# Patient Record
Sex: MALE | Race: WHITE | Employment: OTHER | URBAN - METROPOLITAN AREA
[De-identification: names, ages, dates, MRNs, and addresses within clinical notes are randomized per-mention and may not be internally consistent; named-entity substitution may affect disease eponyms.]

---

## 2017-01-10 ENCOUNTER — OFFICE VISIT (OUTPATIENT)
Dept: CARDIOLOGY CLINIC | Age: 72
End: 2017-01-10

## 2017-01-10 VITALS
SYSTOLIC BLOOD PRESSURE: 120 MMHG | HEART RATE: 64 BPM | HEIGHT: 72 IN | WEIGHT: 270 LBS | DIASTOLIC BLOOD PRESSURE: 60 MMHG | BODY MASS INDEX: 36.57 KG/M2

## 2017-01-10 DIAGNOSIS — I25.810 CORONARY ARTERY DISEASE INVOLVING AUTOLOGOUS VEIN CORONARY BYPASS GRAFT WITHOUT ANGINA PECTORIS: Primary | ICD-10-CM

## 2017-01-10 DIAGNOSIS — I10 ESSENTIAL HYPERTENSION: ICD-10-CM

## 2017-01-10 DIAGNOSIS — E78.00 PURE HYPERCHOLESTEROLEMIA: ICD-10-CM

## 2017-01-10 PROCEDURE — 99214 OFFICE O/P EST MOD 30 MIN: CPT | Performed by: NURSE PRACTITIONER

## 2017-01-10 RX ORDER — DOCUSATE SODIUM 100 MG/1
100 CAPSULE, LIQUID FILLED ORAL DAILY
COMMUNITY
End: 2017-06-15 | Stop reason: ALTCHOICE

## 2017-03-24 DIAGNOSIS — E78.00 PURE HYPERCHOLESTEROLEMIA: ICD-10-CM

## 2017-03-27 RX ORDER — SIMVASTATIN 40 MG
TABLET ORAL
Qty: 90 TABLET | Refills: 2 | Status: SHIPPED | OUTPATIENT
Start: 2017-03-27 | End: 2017-12-18 | Stop reason: SDUPTHER

## 2017-06-15 ENCOUNTER — OFFICE VISIT (OUTPATIENT)
Dept: FAMILY MEDICINE CLINIC | Age: 72
End: 2017-06-15

## 2017-06-15 VITALS
RESPIRATION RATE: 14 BRPM | HEIGHT: 72 IN | WEIGHT: 257 LBS | BODY MASS INDEX: 34.81 KG/M2 | SYSTOLIC BLOOD PRESSURE: 110 MMHG | DIASTOLIC BLOOD PRESSURE: 60 MMHG | HEART RATE: 67 BPM | OXYGEN SATURATION: 98 %

## 2017-06-15 DIAGNOSIS — E78.00 PURE HYPERCHOLESTEROLEMIA: ICD-10-CM

## 2017-06-15 DIAGNOSIS — Z12.5 PROSTATE CANCER SCREENING: ICD-10-CM

## 2017-06-15 DIAGNOSIS — E11.9 TYPE 2 DIABETES MELLITUS WITHOUT COMPLICATION, WITHOUT LONG-TERM CURRENT USE OF INSULIN (HCC): Primary | ICD-10-CM

## 2017-06-15 DIAGNOSIS — E11.9 TYPE 2 DIABETES MELLITUS WITHOUT COMPLICATION, WITHOUT LONG-TERM CURRENT USE OF INSULIN (HCC): ICD-10-CM

## 2017-06-15 DIAGNOSIS — I10 ESSENTIAL HYPERTENSION: ICD-10-CM

## 2017-06-15 DIAGNOSIS — I25.10 CAD IN NATIVE ARTERY: ICD-10-CM

## 2017-06-15 DIAGNOSIS — E66.09 NON MORBID OBESITY DUE TO EXCESS CALORIES: ICD-10-CM

## 2017-06-15 LAB
A/G RATIO: 1.7 (ref 1.1–2.2)
ALBUMIN SERPL-MCNC: 4.5 G/DL (ref 3.4–5)
ALP BLD-CCNC: 40 U/L (ref 40–129)
ALT SERPL-CCNC: 16 U/L (ref 10–40)
ANION GAP SERPL CALCULATED.3IONS-SCNC: 15 MMOL/L (ref 3–16)
AST SERPL-CCNC: 17 U/L (ref 15–37)
BASOPHILS ABSOLUTE: 0 K/UL (ref 0–0.2)
BASOPHILS RELATIVE PERCENT: 0.4 %
BILIRUB SERPL-MCNC: 0.6 MG/DL (ref 0–1)
BUN BLDV-MCNC: 15 MG/DL (ref 7–20)
CALCIUM SERPL-MCNC: 9.6 MG/DL (ref 8.3–10.6)
CHLORIDE BLD-SCNC: 101 MMOL/L (ref 99–110)
CHOLESTEROL, TOTAL: 156 MG/DL (ref 0–199)
CO2: 25 MMOL/L (ref 21–32)
CREAT SERPL-MCNC: 0.9 MG/DL (ref 0.8–1.3)
CREATININE URINE POCT: 30
EOSINOPHILS ABSOLUTE: 0.1 K/UL (ref 0–0.6)
EOSINOPHILS RELATIVE PERCENT: 1.5 %
GFR AFRICAN AMERICAN: >60
GFR NON-AFRICAN AMERICAN: >60
GLOBULIN: 2.7 G/DL
GLUCOSE BLD-MCNC: 108 MG/DL (ref 70–99)
HBA1C MFR BLD: 7.4 %
HCT VFR BLD CALC: 42.7 % (ref 40.5–52.5)
HDLC SERPL-MCNC: 57 MG/DL (ref 40–60)
HEMOGLOBIN: 13.9 G/DL (ref 13.5–17.5)
HEPATITIS C ANTIBODY INTERPRETATION: NORMAL
LDL CHOLESTEROL CALCULATED: 73 MG/DL
LYMPHOCYTES ABSOLUTE: 2 K/UL (ref 1–5.1)
LYMPHOCYTES RELATIVE PERCENT: 39.3 %
MCH RBC QN AUTO: 30.3 PG (ref 26–34)
MCHC RBC AUTO-ENTMCNC: 32.5 G/DL (ref 31–36)
MCV RBC AUTO: 93.3 FL (ref 80–100)
MICROALBUMIN/CREAT 24H UR: 10 MG/G{CREAT}
MICROALBUMIN/CREAT UR-RTO: 30
MONOCYTES ABSOLUTE: 0.4 K/UL (ref 0–1.3)
MONOCYTES RELATIVE PERCENT: 8.2 %
NEUTROPHILS ABSOLUTE: 2.5 K/UL (ref 1.7–7.7)
NEUTROPHILS RELATIVE PERCENT: 50.6 %
PDW BLD-RTO: 13.5 % (ref 12.4–15.4)
PLATELET # BLD: 213 K/UL (ref 135–450)
PMV BLD AUTO: 9.9 FL (ref 5–10.5)
POTASSIUM SERPL-SCNC: 5.3 MMOL/L (ref 3.5–5.1)
PROSTATE SPECIFIC ANTIGEN: 0.38 NG/ML (ref 0–4)
RBC # BLD: 4.57 M/UL (ref 4.2–5.9)
SODIUM BLD-SCNC: 141 MMOL/L (ref 136–145)
TOTAL PROTEIN: 7.2 G/DL (ref 6.4–8.2)
TRIGL SERPL-MCNC: 128 MG/DL (ref 0–150)
VLDLC SERPL CALC-MCNC: 26 MG/DL
WBC # BLD: 5 K/UL (ref 4–11)

## 2017-06-15 PROCEDURE — 99214 OFFICE O/P EST MOD 30 MIN: CPT | Performed by: FAMILY MEDICINE

## 2017-06-15 PROCEDURE — 83036 HEMOGLOBIN GLYCOSYLATED A1C: CPT | Performed by: FAMILY MEDICINE

## 2017-06-15 PROCEDURE — 82044 UR ALBUMIN SEMIQUANTITATIVE: CPT | Performed by: FAMILY MEDICINE

## 2017-06-15 PROCEDURE — 3046F HEMOGLOBIN A1C LEVEL >9.0%: CPT | Performed by: FAMILY MEDICINE

## 2017-06-15 PROCEDURE — G8419 CALC BMI OUT NRM PARAM NOF/U: HCPCS | Performed by: FAMILY MEDICINE

## 2017-06-15 PROCEDURE — 1036F TOBACCO NON-USER: CPT | Performed by: FAMILY MEDICINE

## 2017-06-15 PROCEDURE — 4040F PNEUMOC VAC/ADMIN/RCVD: CPT | Performed by: FAMILY MEDICINE

## 2017-06-15 PROCEDURE — 1123F ACP DISCUSS/DSCN MKR DOCD: CPT | Performed by: FAMILY MEDICINE

## 2017-06-15 PROCEDURE — G8599 NO ASA/ANTIPLAT THER USE RNG: HCPCS | Performed by: FAMILY MEDICINE

## 2017-06-15 PROCEDURE — G8427 DOCREV CUR MEDS BY ELIG CLIN: HCPCS | Performed by: FAMILY MEDICINE

## 2017-06-15 PROCEDURE — 3017F COLORECTAL CA SCREEN DOC REV: CPT | Performed by: FAMILY MEDICINE

## 2017-06-15 ASSESSMENT — PATIENT HEALTH QUESTIONNAIRE - PHQ9
2. FEELING DOWN, DEPRESSED OR HOPELESS: 0
SUM OF ALL RESPONSES TO PHQ9 QUESTIONS 1 & 2: 0
SUM OF ALL RESPONSES TO PHQ QUESTIONS 1-9: 0
1. LITTLE INTEREST OR PLEASURE IN DOING THINGS: 0

## 2017-06-16 LAB
ESTIMATED AVERAGE GLUCOSE: 162.8 MG/DL
HBA1C MFR BLD: 7.3 %

## 2017-06-29 ENCOUNTER — OFFICE VISIT (OUTPATIENT)
Dept: ENT CLINIC | Age: 72
End: 2017-06-29

## 2017-06-29 VITALS — BODY MASS INDEX: 34.54 KG/M2 | HEIGHT: 72 IN | WEIGHT: 255 LBS

## 2017-06-29 DIAGNOSIS — J01.00 SUBACUTE MAXILLARY SINUSITIS: Primary | ICD-10-CM

## 2017-06-29 PROCEDURE — G8427 DOCREV CUR MEDS BY ELIG CLIN: HCPCS | Performed by: OTOLARYNGOLOGY

## 2017-06-29 PROCEDURE — 3017F COLORECTAL CA SCREEN DOC REV: CPT | Performed by: OTOLARYNGOLOGY

## 2017-06-29 PROCEDURE — G8417 CALC BMI ABV UP PARAM F/U: HCPCS | Performed by: OTOLARYNGOLOGY

## 2017-06-29 PROCEDURE — 1036F TOBACCO NON-USER: CPT | Performed by: OTOLARYNGOLOGY

## 2017-06-29 PROCEDURE — G8599 NO ASA/ANTIPLAT THER USE RNG: HCPCS | Performed by: OTOLARYNGOLOGY

## 2017-06-29 PROCEDURE — 99203 OFFICE O/P NEW LOW 30 MIN: CPT | Performed by: OTOLARYNGOLOGY

## 2017-06-29 PROCEDURE — 4040F PNEUMOC VAC/ADMIN/RCVD: CPT | Performed by: OTOLARYNGOLOGY

## 2017-06-29 PROCEDURE — 1123F ACP DISCUSS/DSCN MKR DOCD: CPT | Performed by: OTOLARYNGOLOGY

## 2017-07-03 DIAGNOSIS — E11.9 TYPE 2 DIABETES MELLITUS WITHOUT COMPLICATION, UNSPECIFIED LONG TERM INSULIN USE STATUS: ICD-10-CM

## 2017-07-14 ENCOUNTER — OFFICE VISIT (OUTPATIENT)
Dept: ENT CLINIC | Age: 72
End: 2017-07-14

## 2017-07-14 VITALS
SYSTOLIC BLOOD PRESSURE: 120 MMHG | HEART RATE: 76 BPM | DIASTOLIC BLOOD PRESSURE: 69 MMHG | WEIGHT: 257 LBS | HEIGHT: 72 IN | BODY MASS INDEX: 34.81 KG/M2

## 2017-07-14 DIAGNOSIS — J01.00 SUBACUTE MAXILLARY SINUSITIS: Primary | ICD-10-CM

## 2017-07-14 PROCEDURE — G8428 CUR MEDS NOT DOCUMENT: HCPCS | Performed by: OTOLARYNGOLOGY

## 2017-07-14 PROCEDURE — 99212 OFFICE O/P EST SF 10 MIN: CPT | Performed by: OTOLARYNGOLOGY

## 2017-07-14 PROCEDURE — 1123F ACP DISCUSS/DSCN MKR DOCD: CPT | Performed by: OTOLARYNGOLOGY

## 2017-07-14 PROCEDURE — 1036F TOBACCO NON-USER: CPT | Performed by: OTOLARYNGOLOGY

## 2017-07-14 PROCEDURE — 4040F PNEUMOC VAC/ADMIN/RCVD: CPT | Performed by: OTOLARYNGOLOGY

## 2017-07-14 PROCEDURE — G8417 CALC BMI ABV UP PARAM F/U: HCPCS | Performed by: OTOLARYNGOLOGY

## 2017-07-14 PROCEDURE — 3017F COLORECTAL CA SCREEN DOC REV: CPT | Performed by: OTOLARYNGOLOGY

## 2017-07-14 PROCEDURE — G8599 NO ASA/ANTIPLAT THER USE RNG: HCPCS | Performed by: OTOLARYNGOLOGY

## 2017-07-16 ENCOUNTER — HOSPITAL ENCOUNTER (OUTPATIENT)
Dept: CT IMAGING | Age: 72
Discharge: HOME | End: 2017-07-16
Attending: OTOLARYNGOLOGY | Admitting: OTOLARYNGOLOGY

## 2017-07-16 DIAGNOSIS — J01.00 SUBACUTE MAXILLARY SINUSITIS: ICD-10-CM

## 2017-07-16 DIAGNOSIS — J01.00 ACUTE MAXILLARY SINUSITIS: ICD-10-CM

## 2017-07-17 ENCOUNTER — TELEPHONE (OUTPATIENT)
Dept: ENT CLINIC | Age: 72
End: 2017-07-17

## 2017-07-18 ENCOUNTER — TELEPHONE (OUTPATIENT)
Dept: ENT CLINIC | Age: 72
End: 2017-07-18

## 2017-07-18 DIAGNOSIS — J01.00 SUBACUTE MAXILLARY SINUSITIS: Primary | ICD-10-CM

## 2017-07-18 RX ORDER — DOXYCYCLINE HYCLATE 100 MG
100 TABLET ORAL 2 TIMES DAILY
Qty: 14 TABLET | Refills: 0 | Status: SHIPPED | OUTPATIENT
Start: 2017-07-18 | End: 2017-11-27 | Stop reason: HOSPADM

## 2017-07-19 ENCOUNTER — TELEPHONE (OUTPATIENT)
Dept: ENT CLINIC | Age: 72
End: 2017-07-19

## 2017-07-19 DIAGNOSIS — J01.00 SUBACUTE MAXILLARY SINUSITIS: ICD-10-CM

## 2017-07-26 ENCOUNTER — OFFICE VISIT (OUTPATIENT)
Dept: ENT CLINIC | Age: 72
End: 2017-07-26

## 2017-07-26 VITALS — HEIGHT: 72 IN | WEIGHT: 257 LBS | BODY MASS INDEX: 34.81 KG/M2

## 2017-07-26 DIAGNOSIS — J01.00 SUBACUTE MAXILLARY SINUSITIS: Primary | ICD-10-CM

## 2017-07-26 PROCEDURE — 1036F TOBACCO NON-USER: CPT | Performed by: OTOLARYNGOLOGY

## 2017-07-26 PROCEDURE — 3017F COLORECTAL CA SCREEN DOC REV: CPT | Performed by: OTOLARYNGOLOGY

## 2017-07-26 PROCEDURE — 4040F PNEUMOC VAC/ADMIN/RCVD: CPT | Performed by: OTOLARYNGOLOGY

## 2017-07-26 PROCEDURE — G8599 NO ASA/ANTIPLAT THER USE RNG: HCPCS | Performed by: OTOLARYNGOLOGY

## 2017-07-26 PROCEDURE — G8417 CALC BMI ABV UP PARAM F/U: HCPCS | Performed by: OTOLARYNGOLOGY

## 2017-07-26 PROCEDURE — G8428 CUR MEDS NOT DOCUMENT: HCPCS | Performed by: OTOLARYNGOLOGY

## 2017-07-26 PROCEDURE — 1123F ACP DISCUSS/DSCN MKR DOCD: CPT | Performed by: OTOLARYNGOLOGY

## 2017-07-26 PROCEDURE — 96372 THER/PROPH/DIAG INJ SC/IM: CPT | Performed by: OTOLARYNGOLOGY

## 2017-07-26 PROCEDURE — 99213 OFFICE O/P EST LOW 20 MIN: CPT | Performed by: OTOLARYNGOLOGY

## 2017-07-26 RX ORDER — AMOXICILLIN AND CLAVULANATE POTASSIUM 875; 125 MG/1; MG/1
1 TABLET, FILM COATED ORAL 2 TIMES DAILY
Qty: 14 TABLET | Refills: 0 | Status: SHIPPED | OUTPATIENT
Start: 2017-07-26 | End: 2017-11-21

## 2017-07-26 RX ORDER — METHYLPREDNISOLONE ACETATE 40 MG/ML
40 INJECTION, SUSPENSION INTRA-ARTICULAR; INTRALESIONAL; INTRAMUSCULAR; SOFT TISSUE ONCE
Status: COMPLETED | OUTPATIENT
Start: 2017-07-26 | End: 2017-07-26

## 2017-07-26 RX ADMIN — METHYLPREDNISOLONE ACETATE 40 MG: 40 INJECTION, SUSPENSION INTRA-ARTICULAR; INTRALESIONAL; INTRAMUSCULAR; SOFT TISSUE at 17:40

## 2017-07-31 RX ORDER — LISINOPRIL 2.5 MG/1
2.5 TABLET ORAL EVERY EVENING
Qty: 90 TABLET | Refills: 3 | Status: SHIPPED | OUTPATIENT
Start: 2017-07-31 | End: 2018-10-22 | Stop reason: DRUGHIGH

## 2017-08-09 ENCOUNTER — OFFICE VISIT (OUTPATIENT)
Dept: ENT CLINIC | Age: 72
End: 2017-08-09

## 2017-08-09 VITALS
HEIGHT: 72 IN | SYSTOLIC BLOOD PRESSURE: 115 MMHG | DIASTOLIC BLOOD PRESSURE: 74 MMHG | BODY MASS INDEX: 34.81 KG/M2 | HEART RATE: 68 BPM | WEIGHT: 257 LBS

## 2017-08-09 DIAGNOSIS — J01.00 SUBACUTE MAXILLARY SINUSITIS: Primary | ICD-10-CM

## 2017-08-09 PROCEDURE — 4040F PNEUMOC VAC/ADMIN/RCVD: CPT | Performed by: OTOLARYNGOLOGY

## 2017-08-09 PROCEDURE — G8428 CUR MEDS NOT DOCUMENT: HCPCS | Performed by: OTOLARYNGOLOGY

## 2017-08-09 PROCEDURE — 1036F TOBACCO NON-USER: CPT | Performed by: OTOLARYNGOLOGY

## 2017-08-09 PROCEDURE — G8599 NO ASA/ANTIPLAT THER USE RNG: HCPCS | Performed by: OTOLARYNGOLOGY

## 2017-08-09 PROCEDURE — 3017F COLORECTAL CA SCREEN DOC REV: CPT | Performed by: OTOLARYNGOLOGY

## 2017-08-09 PROCEDURE — G8417 CALC BMI ABV UP PARAM F/U: HCPCS | Performed by: OTOLARYNGOLOGY

## 2017-08-09 PROCEDURE — 1123F ACP DISCUSS/DSCN MKR DOCD: CPT | Performed by: OTOLARYNGOLOGY

## 2017-08-09 PROCEDURE — 99212 OFFICE O/P EST SF 10 MIN: CPT | Performed by: OTOLARYNGOLOGY

## 2017-08-15 DIAGNOSIS — I10 UNSPECIFIED ESSENTIAL HYPERTENSION: ICD-10-CM

## 2017-08-15 RX ORDER — LISINOPRIL 5 MG/1
TABLET ORAL
Qty: 90 TABLET | Refills: 3 | Status: SHIPPED | OUTPATIENT
Start: 2017-08-15 | End: 2017-09-21 | Stop reason: SDUPTHER

## 2017-08-28 DIAGNOSIS — E11.9 TYPE 2 DIABETES MELLITUS WITHOUT COMPLICATION, UNSPECIFIED LONG TERM INSULIN USE STATUS: ICD-10-CM

## 2017-08-28 RX ORDER — PIOGLITAZONEHYDROCHLORIDE 30 MG/1
TABLET ORAL
Qty: 90 TABLET | Refills: 3 | Status: SHIPPED | OUTPATIENT
Start: 2017-08-28 | End: 2018-08-30 | Stop reason: ALTCHOICE

## 2017-09-21 DIAGNOSIS — I10 UNSPECIFIED ESSENTIAL HYPERTENSION: ICD-10-CM

## 2017-09-21 RX ORDER — LISINOPRIL 5 MG/1
TABLET ORAL
Qty: 90 TABLET | Refills: 3 | Status: SHIPPED | OUTPATIENT
Start: 2017-09-21 | End: 2017-12-22

## 2017-11-08 ENCOUNTER — OFFICE VISIT (OUTPATIENT)
Dept: ENT CLINIC | Age: 72
End: 2017-11-08

## 2017-11-08 VITALS
RESPIRATION RATE: 16 BRPM | BODY MASS INDEX: 34.81 KG/M2 | WEIGHT: 257 LBS | HEART RATE: 88 BPM | HEIGHT: 72 IN | DIASTOLIC BLOOD PRESSURE: 89 MMHG | SYSTOLIC BLOOD PRESSURE: 128 MMHG

## 2017-11-08 DIAGNOSIS — J01.00 SUBACUTE MAXILLARY SINUSITIS: Primary | ICD-10-CM

## 2017-11-08 PROCEDURE — G8484 FLU IMMUNIZE NO ADMIN: HCPCS | Performed by: OTOLARYNGOLOGY

## 2017-11-08 PROCEDURE — G8599 NO ASA/ANTIPLAT THER USE RNG: HCPCS | Performed by: OTOLARYNGOLOGY

## 2017-11-08 PROCEDURE — G8427 DOCREV CUR MEDS BY ELIG CLIN: HCPCS | Performed by: OTOLARYNGOLOGY

## 2017-11-08 PROCEDURE — 99213 OFFICE O/P EST LOW 20 MIN: CPT | Performed by: OTOLARYNGOLOGY

## 2017-11-08 PROCEDURE — 1036F TOBACCO NON-USER: CPT | Performed by: OTOLARYNGOLOGY

## 2017-11-08 PROCEDURE — G8417 CALC BMI ABV UP PARAM F/U: HCPCS | Performed by: OTOLARYNGOLOGY

## 2017-11-08 PROCEDURE — 4040F PNEUMOC VAC/ADMIN/RCVD: CPT | Performed by: OTOLARYNGOLOGY

## 2017-11-08 PROCEDURE — 1123F ACP DISCUSS/DSCN MKR DOCD: CPT | Performed by: OTOLARYNGOLOGY

## 2017-11-08 PROCEDURE — 3017F COLORECTAL CA SCREEN DOC REV: CPT | Performed by: OTOLARYNGOLOGY

## 2017-11-21 ENCOUNTER — TELEPHONE (OUTPATIENT)
Dept: ENT CLINIC | Age: 72
End: 2017-11-21

## 2017-11-21 NOTE — TELEPHONE ENCOUNTER
Pt has couple questions about his surgery on Monday 1. Would they be able to repair if there is a passage between gum and sinus while you are in there?   Please  advise

## 2017-11-27 ENCOUNTER — HOSPITAL ENCOUNTER (OUTPATIENT)
Dept: SURGERY | Age: 72
Discharge: HOME | End: 2017-11-27
Attending: OTOLARYNGOLOGY | Admitting: OTOLARYNGOLOGY

## 2017-11-27 VITALS
WEIGHT: 276.25 LBS | HEIGHT: 72 IN | BODY MASS INDEX: 37.42 KG/M2 | OXYGEN SATURATION: 98 % | RESPIRATION RATE: 16 BRPM | DIASTOLIC BLOOD PRESSURE: 68 MMHG | SYSTOLIC BLOOD PRESSURE: 149 MMHG | TEMPERATURE: 97.7 F | HEART RATE: 68 BPM

## 2017-11-27 LAB
A/G RATIO: 1.3 (ref 1.1–2.2)
ALBUMIN SERPL-MCNC: 4 G/DL (ref 3.4–5)
ALP BLD-CCNC: 38 U/L (ref 40–129)
ALT SERPL-CCNC: 17 U/L (ref 10–40)
ANION GAP SERPL CALCULATED.3IONS-SCNC: 12 MMOL/L (ref 3–16)
AST SERPL-CCNC: 18 U/L (ref 15–37)
BILIRUB SERPL-MCNC: 0.5 MG/DL (ref 0–1)
BUN BLDV-MCNC: 17 MG/DL (ref 7–20)
CALCIUM SERPL-MCNC: 8.6 MG/DL (ref 8.3–10.6)
CHLORIDE BLD-SCNC: 101 MMOL/L (ref 99–110)
CO2: 26 MMOL/L (ref 21–32)
CREAT SERPL-MCNC: 0.8 MG/DL (ref 0.8–1.3)
GFR AFRICAN AMERICAN: >60
GFR NON-AFRICAN AMERICAN: >60
GLOBULIN: 3.1 G/DL
GLUCOSE BLD-MCNC: 171 MG/DL (ref 70–99)
GLUCOSE BLD-MCNC: 172 MG/DL (ref 70–99)
HCT VFR BLD CALC: 39.5 % (ref 40.5–52.5)
HEMOGLOBIN: 13.3 G/DL (ref 13.5–17.5)
MCH RBC QN AUTO: 31 PG (ref 26–34)
MCHC RBC AUTO-ENTMCNC: 33.7 G/DL (ref 31–36)
MCV RBC AUTO: 91.8 FL (ref 80–100)
PDW BLD-RTO: 13.6 % (ref 12.4–15.4)
PERFORMED ON: ABNORMAL
PLATELET # BLD: 190 K/UL (ref 135–450)
PMV BLD AUTO: 8.7 FL (ref 5–10.5)
POTASSIUM SERPL-SCNC: 4.4 MMOL/L (ref 3.5–5.1)
RBC # BLD: 4.31 M/UL (ref 4.2–5.9)
SODIUM BLD-SCNC: 139 MMOL/L (ref 136–145)
TOTAL PROTEIN: 7.1 G/DL (ref 6.4–8.2)
WBC # BLD: 4.4 K/UL (ref 4–11)

## 2017-11-27 PROCEDURE — 31267 ENDOSCOPY MAXILLARY SINUS: CPT | Performed by: OTOLARYNGOLOGY

## 2017-11-27 RX ORDER — SODIUM CHLORIDE 0.9 % (FLUSH) 0.9 %
10 SYRINGE (ML) INJECTION PRN
Status: DISCONTINUED | OUTPATIENT
Start: 2017-11-27 | End: 2017-11-28 | Stop reason: HOSPADM

## 2017-11-27 RX ORDER — LIDOCAINE HYDROCHLORIDE 10 MG/ML
1 INJECTION, SOLUTION EPIDURAL; INFILTRATION; INTRACAUDAL; PERINEURAL
Status: ACTIVE | OUTPATIENT
Start: 2017-11-27 | End: 2017-11-27

## 2017-11-27 RX ORDER — SODIUM CHLORIDE 0.9 % (FLUSH) 0.9 %
10 SYRINGE (ML) INJECTION EVERY 12 HOURS SCHEDULED
Status: DISCONTINUED | OUTPATIENT
Start: 2017-11-27 | End: 2017-11-28 | Stop reason: HOSPADM

## 2017-11-27 RX ORDER — OXYMETAZOLINE HYDROCHLORIDE 0.05 G/100ML
1 SPRAY NASAL ONCE
Status: DISCONTINUED | OUTPATIENT
Start: 2017-11-27 | End: 2017-11-28 | Stop reason: HOSPADM

## 2017-11-27 RX ORDER — ONDANSETRON 2 MG/ML
4 INJECTION INTRAMUSCULAR; INTRAVENOUS PRN
Status: DISCONTINUED | OUTPATIENT
Start: 2017-11-27 | End: 2017-11-28 | Stop reason: HOSPADM

## 2017-11-27 RX ORDER — ONDANSETRON 2 MG/ML
4 INJECTION INTRAMUSCULAR; INTRAVENOUS
Status: ACTIVE | OUTPATIENT
Start: 2017-11-27 | End: 2017-11-27

## 2017-11-27 RX ORDER — SODIUM CHLORIDE 9 MG/ML
INJECTION, SOLUTION INTRAVENOUS CONTINUOUS
Status: DISCONTINUED | OUTPATIENT
Start: 2017-11-27 | End: 2017-11-28 | Stop reason: HOSPADM

## 2017-11-27 RX ORDER — OXYCODONE HYDROCHLORIDE 5 MG/1
5 TABLET ORAL PRN
Status: ACTIVE | OUTPATIENT
Start: 2017-11-27 | End: 2017-11-27

## 2017-11-27 RX ORDER — HYDROMORPHONE HCL 110MG/55ML
0.25 PATIENT CONTROLLED ANALGESIA SYRINGE INTRAVENOUS EVERY 5 MIN PRN
Status: DISCONTINUED | OUTPATIENT
Start: 2017-11-27 | End: 2017-11-28 | Stop reason: HOSPADM

## 2017-11-27 RX ORDER — OXYCODONE HYDROCHLORIDE 5 MG/1
10 TABLET ORAL PRN
Status: ACTIVE | OUTPATIENT
Start: 2017-11-27 | End: 2017-11-27

## 2017-11-27 RX ORDER — HYDROMORPHONE HCL 110MG/55ML
0.5 PATIENT CONTROLLED ANALGESIA SYRINGE INTRAVENOUS EVERY 5 MIN PRN
Status: DISCONTINUED | OUTPATIENT
Start: 2017-11-27 | End: 2017-11-28 | Stop reason: HOSPADM

## 2017-11-27 RX ORDER — FENTANYL CITRATE 50 UG/ML
50 INJECTION, SOLUTION INTRAMUSCULAR; INTRAVENOUS EVERY 5 MIN PRN
Status: DISCONTINUED | OUTPATIENT
Start: 2017-11-27 | End: 2017-11-28 | Stop reason: HOSPADM

## 2017-11-27 RX ORDER — LIDOCAINE HYDROCHLORIDE 10 MG/ML
0.5 INJECTION, SOLUTION EPIDURAL; INFILTRATION; INTRACAUDAL; PERINEURAL ONCE
Status: DISCONTINUED | OUTPATIENT
Start: 2017-11-27 | End: 2017-11-28 | Stop reason: HOSPADM

## 2017-11-27 RX ORDER — CEFAZOLIN SODIUM 2 G/100ML
2 INJECTION, SOLUTION INTRAVENOUS
Status: DISCONTINUED | OUTPATIENT
Start: 2017-11-27 | End: 2017-11-27 | Stop reason: DRUGHIGH

## 2017-11-27 RX ORDER — ACETAMINOPHEN 325 MG/1
650 TABLET ORAL EVERY 4 HOURS PRN
Status: DISCONTINUED | OUTPATIENT
Start: 2017-11-27 | End: 2017-11-28 | Stop reason: HOSPADM

## 2017-11-27 RX ORDER — MEPERIDINE HYDROCHLORIDE 25 MG/ML
12.5 INJECTION INTRAMUSCULAR; INTRAVENOUS; SUBCUTANEOUS EVERY 5 MIN PRN
Status: DISCONTINUED | OUTPATIENT
Start: 2017-11-27 | End: 2017-11-28 | Stop reason: HOSPADM

## 2017-11-27 RX ORDER — ONDANSETRON 2 MG/ML
4 INJECTION INTRAMUSCULAR; INTRAVENOUS EVERY 6 HOURS PRN
Status: DISCONTINUED | OUTPATIENT
Start: 2017-11-27 | End: 2017-11-28 | Stop reason: HOSPADM

## 2017-11-27 RX ORDER — FENTANYL CITRATE 50 UG/ML
25 INJECTION, SOLUTION INTRAMUSCULAR; INTRAVENOUS EVERY 5 MIN PRN
Status: DISCONTINUED | OUTPATIENT
Start: 2017-11-27 | End: 2017-11-28 | Stop reason: HOSPADM

## 2017-11-27 RX ADMIN — ACETAMINOPHEN 650 MG: 325 TABLET ORAL at 10:18

## 2017-11-27 RX ADMIN — FENTANYL CITRATE 25 MCG: 50 INJECTION, SOLUTION INTRAMUSCULAR; INTRAVENOUS at 10:08

## 2017-11-27 RX ADMIN — SODIUM CHLORIDE: 9 INJECTION, SOLUTION INTRAVENOUS at 07:37

## 2017-11-27 ASSESSMENT — PAIN - FUNCTIONAL ASSESSMENT: PAIN_FUNCTIONAL_ASSESSMENT: 0-10

## 2017-11-27 ASSESSMENT — PAIN DESCRIPTION - LOCATION
LOCATION: HEAD
LOCATION: HEAD

## 2017-11-27 ASSESSMENT — PAIN DESCRIPTION - PAIN TYPE
TYPE: SURGICAL PAIN
TYPE: SURGICAL PAIN

## 2017-11-27 ASSESSMENT — PAIN DESCRIPTION - DESCRIPTORS: DESCRIPTORS: ACHING

## 2017-11-27 ASSESSMENT — PAIN SCALES - GENERAL
PAINLEVEL_OUTOF10: 5
PAINLEVEL_OUTOF10: 5

## 2017-11-27 NOTE — PROGRESS NOTES
Patient to bathroom to urinate without difficulty. All personal belongings with patient.  Wife taking patient home in stable condition    Electronically signed by Edilberto Krause RN on 11/27/2017 at [phone]

## 2017-11-27 NOTE — ANESTHESIA PRE-OP
MCG/ACT nasal spray 2 sprays by Nasal route daily 7/19/17   Matthias Flynn MD   budesonide (RHINOCORT AQUA) 32 MCG/ACT nasal spray 2 sprays by Nasal route daily 7/19/17   Matthias Flynn MD   doxycycline hyclate (VIBRA-TABS) 100 MG tablet Take 1 tablet by mouth 2 times daily 7/18/17   Matthias Flynn MD   metFORMIN (GLUCOPHAGE) 1000 MG tablet TAKE ONE TABLET BY MOUTH TWICE A DAY WITH MEALS 7/3/17   Shanice Butler MD   simvastatin (ZOCOR) 40 MG tablet TAKE ONE TABLET BY MOUTH ONCE NIGHTLY 3/27/17   Berta Zepeda MD   aspirin 325 MG EC tablet Take 1 tablet by mouth daily 12/14/15   Shanice Butler MD   glucose monitoring kit (FREESTYLE) monitoring kit 1 kit by Does not apply route daily as needed 8/17/15   Shanice Butler MD   glucose blood VI test strips (EXACTECH TEST) strip 1 each by In Vitro route 3 times daily As needed.  8/17/15   Shanice Butler MD   Lancets MISC One Touch Verio IQ Pt test TID 8/17/15   Shanice Butler MD   Multiple Vitamins-Minerals (THERAPEUTIC MULTIVITAMIN-MINERALS) tablet Take 1 tablet by mouth daily    Historical Provider, MD       Current Outpatient Prescriptions   Medication Sig Dispense Refill    metoprolol tartrate (LOPRESSOR) 25 MG tablet Take 0.5 tablets by mouth 2 times daily 180 tablet 1    lisinopril (PRINIVIL;ZESTRIL) 5 MG tablet TAKE 1/2 TABLET BY MOUTH DAILY 90 tablet 3    pioglitazone (ACTOS) 30 MG tablet TAKE ONE TABLET BY MOUTH DAILY 90 tablet 3    lisinopril (ZESTRIL) 2.5 MG tablet Take 1 tablet by mouth every evening 90 tablet 3    budesonide (RHINOCORT AQUA) 32 MCG/ACT nasal spray 2 sprays by Nasal route daily 1 Bottle 0    budesonide (RHINOCORT AQUA) 32 MCG/ACT nasal spray 2 sprays by Nasal route daily 1 Bottle 0    doxycycline hyclate (VIBRA-TABS) 100 MG tablet Take 1 tablet by mouth 2 times daily 14 tablet 0    metFORMIN (GLUCOPHAGE) 1000 MG tablet TAKE ONE TABLET BY MOUTH TWICE A DAY WITH MEALS 180 tablet 2    simvastatin (ZOCOR) 40 MG tablet TAKE ONE TABLET BY MOUTH ONCE NIGHTLY 90 tablet 2    aspirin 325 MG EC tablet Take 1 tablet by mouth daily 30 tablet 3    glucose monitoring kit (FREESTYLE) monitoring kit 1 kit by Does not apply route daily as needed 1 kit 0    glucose blood VI test strips (EXACTECH TEST) strip 1 each by In Vitro route 3 times daily As needed. 100 each 3    Lancets MISC One Touch Verio IQ Pt test  each 3    Multiple Vitamins-Minerals (THERAPEUTIC MULTIVITAMIN-MINERALS) tablet Take 1 tablet by mouth daily       Current Facility-Administered Medications   Medication Dose Route Frequency Provider Last Rate Last Dose    fentaNYL (SUBLIMAZE) injection 25 mcg  25 mcg Intravenous Q5 Min PRN Kevin Rojas, DO        fentaNYL (SUBLIMAZE) injection 50 mcg  50 mcg Intravenous Q5 Min PRN Kevin oRjas, DO        HYDROmorphone (DILAUDID) injection 0.25 mg  0.25 mg Intravenous Q5 Min PRN Kevin Rojas, DO        HYDROmorphone (DILAUDID) injection 0.5 mg  0.5 mg Intravenous Q5 Min PRN Kevin Rojas, DO        oxyCODONE (ROXICODONE) immediate release tablet 5 mg  5 mg Oral PRN Kevin Rojas, DO        Or    oxyCODONE (ROXICODONE) immediate release tablet 10 mg  10 mg Oral PRN Kevin Rojas, DO        [facility]ansetron [facility] injection 4 mg  4 mg Intravenous Once PRN Kevin Rojas, DO        meperidine (DEMEROL) injection 12.5 mg  12.5 mg Intravenous Q5 Min PRN Kevin Rojas, DO           Vital Signs  (Current) There were no vitals filed for this visit.   (for past 48 hrs)  No Data Recorded  (last three values)   BP Readings from Last 3 Encounters:   11/08/17 128/89   08/09/17 115/74   07/14/17 120/69       CBC  Lab Results   Component Value Date    WBC 5.0 06/15/2017    RBC 4.57 06/15/2017    HGB 13.9 06/15/2017    HCT 42.7 06/15/2017    MCV 93.3 06/15/2017    RDW 13.5 06/15/2017     06/15/2017       CMP    Lab Results   Component Value Date     06/15/2017    K 5.3 06/15/2017     06/15/2017    CO2 25 06/15/2017    BUN 15 06/15/2017    CREATININE 0.9 06/15/2017    GFRAA >60 06/15/2017    GFRAA >60 03/22/2013    AGRATIO 1.7 06/15/2017    LABGLOM >60 06/15/2017    GLUCOSE 108 06/15/2017    PROT 7.2 06/15/2017    PROT 7.4 02/29/2012    CALCIUM 9.6 06/15/2017    BILITOT 0.6 06/15/2017    ALKPHOS 40 06/15/2017    AST 17 06/15/2017    ALT 16 06/15/2017       BMP    Lab Results   Component Value Date     06/15/2017    K 5.3 06/15/2017     06/15/2017    CO2 25 06/15/2017    BUN 15 06/15/2017    CREATININE 0.9 06/15/2017    CALCIUM 9.6 06/15/2017    GFRAA >60 06/15/2017    GFRAA >60 03/22/2013    LABGLOM >60 06/15/2017    GLUCOSE 108 06/15/2017       Coags   Lab Results   Component Value Date    PROTIME 12.1 08/09/2015    INR 1.11 08/09/2015    APTT 53.5 08/11/2015       HCG (If Applicable) No results found for: PREGTESTUR, PREGSERUM, HCG, HCGQUANT     ABGs  Lab Results   Component Value Date    PHART 7.357 08/11/2015    PO2ART 95 08/11/2015    DXZ5CYZ 40 08/11/2015    UIT7VEU 22.8 08/11/2015    BEART -3 08/11/2015    J4CTGUPX 97 08/11/2015        Type & Screen (If Applicable)  No results found for: LABABO, LABRH      POCGlucose  No results for input(s): GLUCOSE in the last 72 hours. NPO Status  > 8 hours                       BMI  There is no height or weight on file to calculate BMI. Estimated body mass index is 34.58 kg/m² as calculated from the following:    Height as of 11/21/17: 6' (1.829 m). Weight as of 11/21/17: 255 lb (115.7 kg).       Additional Testing (Echo, Stress, ECG, PFTs, etc)        Anesthesia Evaluation  Patient summary reviewed and Nursing notes reviewed no history of anesthetic complications:   Airway: Mallampati: III  TM distance: >3 FB   Neck ROM: full  Mouth opening: > = 3 FB Dental: normal exam         Pulmonary:normal exam    (+) sleep apnea:                             Cardiovascular:  Exercise tolerance: poor (<4 METS),   (+) hypertension: moderate, past MI: > 6 months, CAD: obstructive, CABG/stent (CABG 5232):, CHF: systolic, hyperlipidemia      ECG reviewed  Rhythm: regular  Rate: normal           Beta Blocker:  Dose within 24 Hrs      ROS comment: EF 45% unsuccessful Stent attempt     Neuro/Psych:               GI/Hepatic/Renal:             Endo/Other:    (+) Type II DM, , .                 Abdominal:           Vascular:                                        Anesthesia Plan      general     ASA 3     (Plan for GETA with standard ASA monitoring. Additional monitoring as dictated by intra-operative course. Patient appropriately NPO for the procedure. Risk/Benefits reviewed with patient and all anesthetic questions answered prior to procedure.  )  Induction: intravenous. MIPS: Postoperative opioids intended and Prophylactic antiemetics administered. Anesthetic plan and risks discussed with patient. Plan discussed with CRNA. DOS STAFF ADDENDUM:    Pt seen and examined, chart reviewed (including anesthesia, drug and allergy history). No interval changes to history and physical examination. Anesthetic plan, risks, benefits, alternatives, and personnel involved discussed with patient. Patient verbalized an understanding and agrees to proceed.       Linda Chris DO  November 27, 2017  6:52 AM

## 2017-11-27 NOTE — PROGRESS NOTES
Patient awake and alert. Oxygen saturation 97% on room air. NSR on the monitor. VSS. Mustache dressing in place, no drainage noted at this time. Pain tolerable. Tolerating PO without nausea. Patient meets criteria to be discharged from phase 1. Will prepare for discharge home in phase 2.     Electronically signed by Dariel Trimble RN on 11/27/2017 at [phone]

## 2017-11-27 NOTE — PROGRESS NOTES
Patient arrived from OR to PACU. Oxygen saturation 97% on room air. NSR on the monitor. VSS. Mustache dressing in place. Ice applied.  Will continue to monitor    Electronically signed by Jose Yanes RN on 11/27/2017 at [phone]

## 2017-11-27 NOTE — PROGRESS NOTES
Patient rating pain 5/10; titrating fentanyl per PRN order    Electronically signed by Stella Gallagher RN on 11/27/2017 at

## 2017-11-27 NOTE — H&P
of complication, not stated as uncontrolled; and Unspecified sleep apnea. Past Surgical History:   has a past surgical history that includes Lap Band (9/2008); Retinal detachment surgery (1999); Cataract removal with implant (2002); Coronary artery bypass graft (8/11/2015); and Cardiac surgery. Medications:  Current Outpatient Prescriptions on File Prior to Encounter   Medication Sig Dispense Refill    metoprolol tartrate (LOPRESSOR) 25 MG tablet Take 0.5 tablets by mouth 2 times daily 180 tablet 1    lisinopril (PRINIVIL;ZESTRIL) 5 MG tablet TAKE 1/2 TABLET BY MOUTH DAILY 90 tablet 3    pioglitazone (ACTOS) 30 MG tablet TAKE ONE TABLET BY MOUTH DAILY 90 tablet 3    lisinopril (ZESTRIL) 2.5 MG tablet Take 1 tablet by mouth every evening 90 tablet 3    budesonide (RHINOCORT AQUA) 32 MCG/ACT nasal spray 2 sprays by Nasal route daily 1 Bottle 0    budesonide (RHINOCORT AQUA) 32 MCG/ACT nasal spray 2 sprays by Nasal route daily 1 Bottle 0    doxycycline hyclate (VIBRA-TABS) 100 MG tablet Take 1 tablet by mouth 2 times daily 14 tablet 0    metFORMIN (GLUCOPHAGE) 1000 MG tablet TAKE ONE TABLET BY MOUTH TWICE A DAY WITH MEALS 180 tablet 2    simvastatin (ZOCOR) 40 MG tablet TAKE ONE TABLET BY MOUTH ONCE NIGHTLY 90 tablet 2    aspirin 325 MG EC tablet Take 1 tablet by mouth daily 30 tablet 3    glucose monitoring kit (FREESTYLE) monitoring kit 1 kit by Does not apply route daily as needed 1 kit 0    glucose blood VI test strips (EXACTECH TEST) strip 1 each by In Vitro route 3 times daily As needed. 100 each 3    Lancets MISC One Touch Verio IQ Pt test  each 3    Multiple Vitamins-Minerals (THERAPEUTIC MULTIVITAMIN-MINERALS) tablet Take 1 tablet by mouth daily       No current facility-administered medications on file prior to encounter. Allergies:  No Known Allergies     Social History:   reports that he has quit smoking.  He has never used smokeless tobacco. He reports that he does not drink alcohol or use drugs. Family History:  family history includes Alzheimer's Disease in his mother; Cancer in his father; Diabetes in his brother and father; Stroke in his mother. Physical Exam:  BP (!) 140/78   Pulse 88   Temp 97.5 °F (36.4 °C) (Temporal)   Resp 12   Ht 6' (1.829 m)   Wt 276 lb 4 oz (125.3 kg)   SpO2 97%   BMI 37.47 kg/m²     General appearance:  Appears comfortable. Well nourished  Eyes: Sclera clear, pupils equal  ENT: Moist mucus membranes, no thrush. Trachea midline. Cardiovascular: Regular rhythm, normal S1, S2. No murmur, gallop, rub. No edema in lower extremities  Respiratory: Clear to auscultation bilaterally, no wheeze, good inspiratory effort  Gastrointestinal: Abdomen soft, non-tender, not distended, normal bowel sounds  Musculoskeletal: No cyanosis in digits, neck supple  Neurology: Cranial nerves grossly intact. Alert and oriented in time, place and person. No speech or motor deficits  Psychiatry: Appropriate affect. Not agitated  Skin: Warm, dry, normal turgor, no rash    Labs:  CBC:   Lab Results   Component Value Date    WBC 4.4 11/27/2017    RBC 4.31 11/27/2017    HGB 13.3 11/27/2017    HCT 39.5 11/27/2017    MCV 91.8 11/27/2017    MCH 31.0 11/27/2017    MCHC 33.7 11/27/2017    RDW 13.6 11/27/2017     11/27/2017    MPV 8.7 11/27/2017     BMP:    Lab Results   Component Value Date     11/27/2017    K 4.4 11/27/2017     11/27/2017    CO2 26 11/27/2017    BUN 17 11/27/2017    CREATININE 0.8 11/27/2017    CALCIUM 8.6 11/27/2017    GFRAA >60 11/27/2017    GFRAA >60 03/22/2013    LABGLOM >60 11/27/2017    GLUCOSE 171 11/27/2017       EKG reviewed and shows Q waves in inferior leads which is unchanged when compared to previous EKG from 2015     Problem List  CAD  HTN  T2DM      Assessment & Recommendation:     1. CAD:  S/P CABG in 2015: Follows with cardiology Latosha  2. HTN:  BP control is adequate  3.  T2DM:  BG this am is 174 Recent AIC 7.4   4. FRANCISCO:  Uses CPAP at hs     No problems with previous anesthesia      Patient is medically optimized for surgery. Thank you for the opportunity to participate in the care of your patient.   Carl Post CNP    11/27/2017 7:04 AM

## 2017-11-27 NOTE — PROGRESS NOTES
Discharge instructions went over with patient and patient's wife. Dr. Wing Mathurs in to talk to patient. Mustache dressing kit provided.     Electronically signed by Rhiannon Burnett RN on 11/27/2017 at 11:22 AM

## 2017-11-29 NOTE — OP NOTE
[address]                      [address] [facility], [address] [facility]                                 OPERATIVE REPORT    PATIENT NAME: Regino Marina                    :        1945  MED REC NO:   [id number]                          ROOM:  ACCOUNT NO:   [de-identified]                          ADMIT DATE: 2017  PROVIDER:     Alo Anderson MD    DATE OF PROCEDURE:  2017    PREOPERATIVE DIAGNOSIS:  Chronic left maxillary sinusitis. POSTOPERATIVE DIAGNOSIS:  Chronic left maxillary sinusitis. OPERATION PERFORMED:  Functional endoscopic sinus surgery and maxillary  left antrectomy. SURGEON:  Alo Anderson MD.    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 This had been repaired in the dental office and a permanent  bridge now occupied the upper left alveolar ridge. Concerns were raised,  however, given a persistent odor localized to the left side as well as  radiographic findings of opacification of the left antrum. Medical  management had been efficacious and is now brought to the operating room  for surgical drainage. OPERATIVE PROCEDURE:  The patient was placed in the table in supine  position. After general anesthesia was induced, an endotracheal tube was  placed and the patient ventilated well throughout the procedure. The right  eye was protected with tape and the left eye protected with ointment. The  nose was cocainized with 2 mL of 4% cocaine and time allowed for adequate  vasoconstriction. The patient was then draped such that the mid face was  exposed. Following removal of the pledgets, rhinoscopy revealed purulence emanating  from the left middle meatus. A 1 mL of 1% Xylocaine with 1:100,000  epinephrine was injected along the lateral left nasal wall anterior to the  anterior aspect of the middle turbinate. Time was allowed for further  vasoconstriction.     There was noted to be slight

## 2017-12-01 ENCOUNTER — OFFICE VISIT (OUTPATIENT)
Dept: ENT CLINIC | Age: 72
End: 2017-12-01

## 2017-12-01 VITALS
DIASTOLIC BLOOD PRESSURE: 60 MMHG | WEIGHT: 280 LBS | HEART RATE: 72 BPM | HEIGHT: 72 IN | SYSTOLIC BLOOD PRESSURE: 110 MMHG | BODY MASS INDEX: 37.93 KG/M2

## 2017-12-01 DIAGNOSIS — J01.00 SUBACUTE MAXILLARY SINUSITIS: Primary | ICD-10-CM

## 2017-12-01 LAB
ANAEROBIC CULTURE: ABNORMAL
CULTURE SURGICAL: ABNORMAL
CULTURE SURGICAL: ABNORMAL
GRAM STAIN RESULT: ABNORMAL
ORGANISM: ABNORMAL
ORGANISM: ABNORMAL

## 2017-12-01 PROCEDURE — 99024 POSTOP FOLLOW-UP VISIT: CPT | Performed by: OTOLARYNGOLOGY

## 2017-12-01 RX ORDER — AMOXICILLIN AND CLAVULANATE POTASSIUM 875; 125 MG/1; MG/1
1 TABLET, FILM COATED ORAL 2 TIMES DAILY
Qty: 14 TABLET | Refills: 0 | Status: SHIPPED | OUTPATIENT
Start: 2017-12-01 | End: 2017-12-22

## 2017-12-01 NOTE — PROGRESS NOTES
The patient returns for his 1 week postop visit following a left maxillary antrostomy/antrectomy. The odor is gone and the nasal airway much more patent  He has been using his nasal saline spray 3-4 times a day. A small amount of blood tinged and scabbed material has been mobilized    Exam today shows minimal swelling in the region of the left middle turbinate. Remaining debris was suctioned.   The sinus opening appears patent    We reviewed the microbiology report showing 4+ streptococci  He will take Augmentin for 1 week  He will continue irrigations twice a day  I will see him back in 3 weeks for more long-term follow-up

## 2017-12-18 DIAGNOSIS — E78.00 PURE HYPERCHOLESTEROLEMIA: ICD-10-CM

## 2017-12-18 RX ORDER — SIMVASTATIN 40 MG
40 TABLET ORAL NIGHTLY
Qty: 90 TABLET | Refills: 3 | Status: SHIPPED | OUTPATIENT
Start: 2017-12-18 | End: 2018-10-22 | Stop reason: ALTCHOICE

## 2017-12-22 ENCOUNTER — OFFICE VISIT (OUTPATIENT)
Dept: ENT CLINIC | Age: 72
End: 2017-12-22

## 2017-12-22 VITALS — SYSTOLIC BLOOD PRESSURE: 120 MMHG | DIASTOLIC BLOOD PRESSURE: 70 MMHG | BODY MASS INDEX: 38.11 KG/M2 | WEIGHT: 281 LBS

## 2017-12-22 DIAGNOSIS — J32.0 CHRONIC LEFT MAXILLARY SINUSITIS: Primary | ICD-10-CM

## 2017-12-22 PROCEDURE — 99024 POSTOP FOLLOW-UP VISIT: CPT | Performed by: OTOLARYNGOLOGY

## 2017-12-28 ENCOUNTER — OFFICE VISIT (OUTPATIENT)
Dept: FAMILY MEDICINE CLINIC | Age: 72
End: 2017-12-28

## 2017-12-28 VITALS
HEIGHT: 72 IN | SYSTOLIC BLOOD PRESSURE: 130 MMHG | TEMPERATURE: 96.8 F | BODY MASS INDEX: 38.13 KG/M2 | OXYGEN SATURATION: 97 % | HEART RATE: 76 BPM | WEIGHT: 281.53 LBS | DIASTOLIC BLOOD PRESSURE: 70 MMHG | RESPIRATION RATE: 14 BRPM

## 2017-12-28 DIAGNOSIS — J01.00 SUBACUTE MAXILLARY SINUSITIS: ICD-10-CM

## 2017-12-28 DIAGNOSIS — I10 ESSENTIAL HYPERTENSION: Primary | ICD-10-CM

## 2017-12-28 DIAGNOSIS — G57.02 PIRIFORMIS SYNDROME OF LEFT SIDE: ICD-10-CM

## 2017-12-28 DIAGNOSIS — I25.10 CORONARY ARTERY DISEASE INVOLVING NATIVE CORONARY ARTERY OF NATIVE HEART WITHOUT ANGINA PECTORIS: ICD-10-CM

## 2017-12-28 DIAGNOSIS — E11.9 TYPE 2 DIABETES MELLITUS WITHOUT COMPLICATION, WITHOUT LONG-TERM CURRENT USE OF INSULIN (HCC): ICD-10-CM

## 2017-12-28 DIAGNOSIS — E78.2 MIXED HYPERLIPIDEMIA: ICD-10-CM

## 2017-12-28 PROCEDURE — 4040F PNEUMOC VAC/ADMIN/RCVD: CPT | Performed by: FAMILY MEDICINE

## 2017-12-28 PROCEDURE — G8599 NO ASA/ANTIPLAT THER USE RNG: HCPCS | Performed by: FAMILY MEDICINE

## 2017-12-28 PROCEDURE — G8427 DOCREV CUR MEDS BY ELIG CLIN: HCPCS | Performed by: FAMILY MEDICINE

## 2017-12-28 PROCEDURE — 1123F ACP DISCUSS/DSCN MKR DOCD: CPT | Performed by: FAMILY MEDICINE

## 2017-12-28 PROCEDURE — G8484 FLU IMMUNIZE NO ADMIN: HCPCS | Performed by: FAMILY MEDICINE

## 2017-12-28 PROCEDURE — 99214 OFFICE O/P EST MOD 30 MIN: CPT | Performed by: FAMILY MEDICINE

## 2017-12-28 PROCEDURE — 1036F TOBACCO NON-USER: CPT | Performed by: FAMILY MEDICINE

## 2017-12-28 PROCEDURE — 3045F PR MOST RECENT HEMOGLOBIN A1C LEVEL 7.0-9.0%: CPT | Performed by: FAMILY MEDICINE

## 2017-12-28 PROCEDURE — G8417 CALC BMI ABV UP PARAM F/U: HCPCS | Performed by: FAMILY MEDICINE

## 2017-12-28 PROCEDURE — 3017F COLORECTAL CA SCREEN DOC REV: CPT | Performed by: FAMILY MEDICINE

## 2017-12-28 RX ORDER — PIOGLITAZONEHYDROCHLORIDE 45 MG/1
45 TABLET ORAL DAILY
Qty: 30 TABLET | Refills: 3 | Status: SHIPPED | OUTPATIENT
Start: 2017-12-28 | End: 2018-03-27 | Stop reason: SDUPTHER

## 2017-12-28 RX ORDER — SILDENAFIL 50 MG/1
50 TABLET, FILM COATED ORAL PRN
Qty: 4 TABLET | Refills: 0 | COMMUNITY
Start: 2017-12-28 | End: 2018-01-18 | Stop reason: SDUPTHER

## 2017-12-28 NOTE — PROGRESS NOTES
Retinal detachment 1991    OD    Type II or unspecified type diabetes mellitus without mention of complication, not stated as uncontrolled     Unspecified sleep apnea        Social History   Substance Use Topics    Smoking status: Former Smoker    Smokeless tobacco: Never Used    Alcohol use No       Family History   Problem Relation Age of Onset    Cancer Father      liver    Diabetes Father     Stroke Mother     Alzheimer's Disease Mother     Diabetes Brother        No Known Allergies    OBJECTIVE:  /70 (Site: Left Arm, Position: Sitting, Cuff Size: Large Adult)   Pulse 76   Temp 96.8 °F (36 °C)   Resp 14   Ht 6' 0.01\" (1.829 m)   Wt 281 lb 8.4 oz (127.7 kg)   SpO2 97%   BMI 38.17 kg/m²   GEN:  in NAD, Obese  NECK:  Supple without adenopathy. No bruits  CV:  Regular rate and rhythm, S1 and S2 normal, no murmurs, clicks  PULM:  Chest is clear, no wheezing ,  symmetric air entry throughout both lung fields. EXT: No rash or edema  NEURO: nonfocal      a1c - 7.9  ASSESSMENT/PLAN:  1. Essential hypertension  Stable on present medication    2. Mixed hyperlipidemia  Not addressed today    3. Type 2 diabetes mellitus without complication, without long-term current use of insulin (HCC)  Needs better control, increase Actos from 30-45 return to office in 3 months  Or exercise and better diet lose 10 pounds  Ophthalmology yearly  4. Subacute maxillary sinusitis  Greatly improved after multiple rounds of antibiotics and recent sinus surgery    5. Coronary artery disease involving native coronary artery of native heart without angina pectoris    5.5 - IUTD - flu shot at pharm  6 priiformis syndrome  Refer to Ortho as NSAIDs rest stretching and chiropractic care has failed to yield relief  7 ed  The patient desires Viagra to treat his erectile dysfunction. History and physical exam has not disclosed any obvious treatable cause of this complaint.  He is informed that Viagra is sometimes not covered by

## 2018-01-18 RX ORDER — SILDENAFIL 50 MG/1
50 TABLET, FILM COATED ORAL PRN
Qty: 4 TABLET | Refills: 0 | Status: SHIPPED | OUTPATIENT
Start: 2018-01-18 | End: 2018-07-18

## 2018-01-31 RX ORDER — SILDENAFIL 100 MG/1
100 TABLET, FILM COATED ORAL PRN
Qty: 4 TABLET | Refills: 0 | COMMUNITY
Start: 2018-01-31 | End: 2018-08-30 | Stop reason: SDUPTHER

## 2018-01-31 RX ORDER — SILDENAFIL 100 MG/1
100 TABLET, FILM COATED ORAL PRN
Qty: 8 TABLET | Refills: 3 | Status: SHIPPED | OUTPATIENT
Start: 2018-01-31 | End: 2019-05-20 | Stop reason: SDUPTHER

## 2018-02-21 ENCOUNTER — OFFICE VISIT (OUTPATIENT)
Dept: FAMILY MEDICINE CLINIC | Age: 73
End: 2018-02-21

## 2018-02-21 VITALS
OXYGEN SATURATION: 95 % | HEIGHT: 72 IN | SYSTOLIC BLOOD PRESSURE: 112 MMHG | BODY MASS INDEX: 38.06 KG/M2 | HEART RATE: 63 BPM | DIASTOLIC BLOOD PRESSURE: 64 MMHG | WEIGHT: 281 LBS

## 2018-02-21 DIAGNOSIS — I10 ESSENTIAL HYPERTENSION: Primary | ICD-10-CM

## 2018-02-21 DIAGNOSIS — E11.9 TYPE 2 DIABETES MELLITUS WITHOUT COMPLICATION, WITHOUT LONG-TERM CURRENT USE OF INSULIN (HCC): ICD-10-CM

## 2018-02-21 DIAGNOSIS — I25.10 CORONARY ARTERY DISEASE INVOLVING NATIVE CORONARY ARTERY OF NATIVE HEART WITHOUT ANGINA PECTORIS: ICD-10-CM

## 2018-02-21 DIAGNOSIS — E78.2 MIXED HYPERLIPIDEMIA: ICD-10-CM

## 2018-02-21 PROCEDURE — G8427 DOCREV CUR MEDS BY ELIG CLIN: HCPCS | Performed by: FAMILY MEDICINE

## 2018-02-21 PROCEDURE — 1036F TOBACCO NON-USER: CPT | Performed by: FAMILY MEDICINE

## 2018-02-21 PROCEDURE — 3046F HEMOGLOBIN A1C LEVEL >9.0%: CPT | Performed by: FAMILY MEDICINE

## 2018-02-21 PROCEDURE — 1123F ACP DISCUSS/DSCN MKR DOCD: CPT | Performed by: FAMILY MEDICINE

## 2018-02-21 PROCEDURE — 3017F COLORECTAL CA SCREEN DOC REV: CPT | Performed by: FAMILY MEDICINE

## 2018-02-21 PROCEDURE — G8417 CALC BMI ABV UP PARAM F/U: HCPCS | Performed by: FAMILY MEDICINE

## 2018-02-21 PROCEDURE — G8599 NO ASA/ANTIPLAT THER USE RNG: HCPCS | Performed by: FAMILY MEDICINE

## 2018-02-21 PROCEDURE — 4040F PNEUMOC VAC/ADMIN/RCVD: CPT | Performed by: FAMILY MEDICINE

## 2018-02-21 PROCEDURE — 99214 OFFICE O/P EST MOD 30 MIN: CPT | Performed by: FAMILY MEDICINE

## 2018-02-21 PROCEDURE — G8484 FLU IMMUNIZE NO ADMIN: HCPCS | Performed by: FAMILY MEDICINE

## 2018-03-15 ENCOUNTER — TELEPHONE (OUTPATIENT)
Dept: FAMILY MEDICINE CLINIC | Age: 73
End: 2018-03-15

## 2018-03-15 RX ORDER — AMOXICILLIN AND CLAVULANATE POTASSIUM 875; 125 MG/1; MG/1
1 TABLET, FILM COATED ORAL 2 TIMES DAILY
Qty: 20 TABLET | Refills: 0 | Status: SHIPPED | OUTPATIENT
Start: 2018-03-15 | End: 2018-03-25

## 2018-03-15 NOTE — TELEPHONE ENCOUNTER
Duration of Symptoms x 2 weeks  Fever: No  Sinus or Facial pressure/pain: Yes  Cough: Yes   Productive or Dry: mucous,greenish  SOB: no  Smoker: No  Patient also has drainage, blowing nose a lot, greenish color  Patient found old cough RX w/codeine and he took some last night to help him sleep, it does help but he doesn't have much left in the bottle  Patient is requesting RX?   Contact patient  Dolv - 2-21-18,  Pharmacy in [address] [place] [address] Rd

## 2018-03-15 NOTE — TELEPHONE ENCOUNTER
Antibiotics sent to his [facility], let patient know

## 2018-03-27 RX ORDER — PIOGLITAZONEHYDROCHLORIDE 45 MG/1
TABLET ORAL
Qty: 30 TABLET | Refills: 2 | Status: SHIPPED | OUTPATIENT
Start: 2018-03-27 | End: 2018-08-30 | Stop reason: ALTCHOICE

## 2018-04-04 ENCOUNTER — OFFICE VISIT (OUTPATIENT)
Dept: FAMILY MEDICINE CLINIC | Age: 73
End: 2018-04-04

## 2018-04-04 VITALS
BODY MASS INDEX: 37.38 KG/M2 | DIASTOLIC BLOOD PRESSURE: 62 MMHG | WEIGHT: 276 LBS | SYSTOLIC BLOOD PRESSURE: 116 MMHG | OXYGEN SATURATION: 97 % | TEMPERATURE: 96.1 F | HEIGHT: 72 IN | RESPIRATION RATE: 12 BRPM

## 2018-04-04 DIAGNOSIS — G89.29 CHRONIC BILATERAL LOW BACK PAIN WITHOUT SCIATICA: ICD-10-CM

## 2018-04-04 DIAGNOSIS — I10 ESSENTIAL HYPERTENSION: Primary | ICD-10-CM

## 2018-04-04 DIAGNOSIS — E78.2 MIXED HYPERLIPIDEMIA: ICD-10-CM

## 2018-04-04 DIAGNOSIS — E11.9 TYPE 2 DIABETES MELLITUS WITHOUT COMPLICATION, WITHOUT LONG-TERM CURRENT USE OF INSULIN (HCC): ICD-10-CM

## 2018-04-04 DIAGNOSIS — I25.10 CAD IN NATIVE ARTERY: ICD-10-CM

## 2018-04-04 DIAGNOSIS — M54.50 CHRONIC BILATERAL LOW BACK PAIN WITHOUT SCIATICA: ICD-10-CM

## 2018-04-04 LAB — HBA1C MFR BLD: 7.6 %

## 2018-04-04 PROCEDURE — 4040F PNEUMOC VAC/ADMIN/RCVD: CPT | Performed by: FAMILY MEDICINE

## 2018-04-04 PROCEDURE — 1036F TOBACCO NON-USER: CPT | Performed by: FAMILY MEDICINE

## 2018-04-04 PROCEDURE — 83036 HEMOGLOBIN GLYCOSYLATED A1C: CPT | Performed by: FAMILY MEDICINE

## 2018-04-04 PROCEDURE — G8599 NO ASA/ANTIPLAT THER USE RNG: HCPCS | Performed by: FAMILY MEDICINE

## 2018-04-04 PROCEDURE — 99214 OFFICE O/P EST MOD 30 MIN: CPT | Performed by: FAMILY MEDICINE

## 2018-04-04 PROCEDURE — 3017F COLORECTAL CA SCREEN DOC REV: CPT | Performed by: FAMILY MEDICINE

## 2018-04-04 PROCEDURE — 1123F ACP DISCUSS/DSCN MKR DOCD: CPT | Performed by: FAMILY MEDICINE

## 2018-04-04 PROCEDURE — 3045F PR MOST RECENT HEMOGLOBIN A1C LEVEL 7.0-9.0%: CPT | Performed by: FAMILY MEDICINE

## 2018-04-04 PROCEDURE — G8417 CALC BMI ABV UP PARAM F/U: HCPCS | Performed by: FAMILY MEDICINE

## 2018-04-04 PROCEDURE — G8427 DOCREV CUR MEDS BY ELIG CLIN: HCPCS | Performed by: FAMILY MEDICINE

## 2018-04-05 DIAGNOSIS — E11.9 TYPE 2 DIABETES MELLITUS WITHOUT COMPLICATION, UNSPECIFIED LONG TERM INSULIN USE STATUS: ICD-10-CM

## 2018-04-20 ENCOUNTER — OFFICE VISIT (OUTPATIENT)
Dept: CARDIOLOGY CLINIC | Age: 73
End: 2018-04-20

## 2018-04-20 VITALS
BODY MASS INDEX: 37.38 KG/M2 | HEIGHT: 72 IN | DIASTOLIC BLOOD PRESSURE: 62 MMHG | HEART RATE: 64 BPM | SYSTOLIC BLOOD PRESSURE: 112 MMHG | WEIGHT: 276 LBS

## 2018-04-20 DIAGNOSIS — I10 ESSENTIAL HYPERTENSION: Primary | ICD-10-CM

## 2018-04-20 DIAGNOSIS — E78.2 MIXED HYPERLIPIDEMIA: ICD-10-CM

## 2018-04-20 DIAGNOSIS — I25.10 CORONARY ARTERY DISEASE INVOLVING NATIVE CORONARY ARTERY OF NATIVE HEART WITHOUT ANGINA PECTORIS: ICD-10-CM

## 2018-04-20 PROCEDURE — 1123F ACP DISCUSS/DSCN MKR DOCD: CPT | Performed by: NURSE PRACTITIONER

## 2018-04-20 PROCEDURE — 3017F COLORECTAL CA SCREEN DOC REV: CPT | Performed by: NURSE PRACTITIONER

## 2018-04-20 PROCEDURE — G8427 DOCREV CUR MEDS BY ELIG CLIN: HCPCS | Performed by: NURSE PRACTITIONER

## 2018-04-20 PROCEDURE — 1036F TOBACCO NON-USER: CPT | Performed by: NURSE PRACTITIONER

## 2018-04-20 PROCEDURE — 99214 OFFICE O/P EST MOD 30 MIN: CPT | Performed by: NURSE PRACTITIONER

## 2018-04-20 PROCEDURE — 4040F PNEUMOC VAC/ADMIN/RCVD: CPT | Performed by: NURSE PRACTITIONER

## 2018-04-20 PROCEDURE — G8599 NO ASA/ANTIPLAT THER USE RNG: HCPCS | Performed by: NURSE PRACTITIONER

## 2018-04-20 PROCEDURE — 93000 ELECTROCARDIOGRAM COMPLETE: CPT | Performed by: NURSE PRACTITIONER

## 2018-04-20 PROCEDURE — G8417 CALC BMI ABV UP PARAM F/U: HCPCS | Performed by: NURSE PRACTITIONER

## 2018-04-30 LAB
ALT SERPL-CCNC: 15 U/L (ref 10–40)
ANION GAP SERPL CALCULATED.3IONS-SCNC: 14 MMOL/L (ref 3–16)
AST SERPL-CCNC: 16 U/L (ref 15–37)
BUN BLDV-MCNC: 17 MG/DL (ref 7–20)
CALCIUM SERPL-MCNC: 9 MG/DL (ref 8.3–10.6)
CHLORIDE BLD-SCNC: 104 MMOL/L (ref 99–110)
CHOLESTEROL, FASTING: 141 MG/DL (ref 0–199)
CO2: 26 MMOL/L (ref 21–32)
CREAT SERPL-MCNC: 0.8 MG/DL (ref 0.8–1.3)
GFR AFRICAN AMERICAN: >60
GFR NON-AFRICAN AMERICAN: >60
GLUCOSE FASTING: 138 MG/DL (ref 70–99)
HDLC SERPL-MCNC: 57 MG/DL (ref 40–60)
LDL CHOLESTEROL CALCULATED: 67 MG/DL
POTASSIUM SERPL-SCNC: 5.4 MMOL/L (ref 3.5–5.1)
SODIUM BLD-SCNC: 144 MMOL/L (ref 136–145)
TRIGLYCERIDE, FASTING: 83 MG/DL (ref 0–150)
VLDLC SERPL CALC-MCNC: 17 MG/DL

## 2018-05-03 ENCOUNTER — TELEPHONE (OUTPATIENT)
Dept: CARDIOLOGY CLINIC | Age: 73
End: 2018-05-03

## 2018-05-03 DIAGNOSIS — E87.5 HIGH POTASSIUM: Primary | ICD-10-CM

## 2018-05-31 DIAGNOSIS — I10 ESSENTIAL HYPERTENSION: Primary | ICD-10-CM

## 2018-05-31 LAB
ANION GAP SERPL CALCULATED.3IONS-SCNC: 15 MMOL/L (ref 3–16)
BUN BLDV-MCNC: 19 MG/DL (ref 7–20)
CALCIUM SERPL-MCNC: 9.4 MG/DL (ref 8.3–10.6)
CHLORIDE BLD-SCNC: 102 MMOL/L (ref 99–110)
CO2: 24 MMOL/L (ref 21–32)
CREAT SERPL-MCNC: 0.8 MG/DL (ref 0.8–1.3)
GFR AFRICAN AMERICAN: >60
GFR NON-AFRICAN AMERICAN: >60
GLUCOSE BLD-MCNC: 113 MG/DL (ref 70–99)
POTASSIUM SERPL-SCNC: 5.5 MMOL/L (ref 3.5–5.1)
SODIUM BLD-SCNC: 141 MMOL/L (ref 136–145)

## 2018-06-04 ENCOUNTER — OFFICE VISIT (OUTPATIENT)
Dept: ENT CLINIC | Age: 73
End: 2018-06-04

## 2018-06-04 VITALS
HEIGHT: 72 IN | WEIGHT: 278 LBS | BODY MASS INDEX: 37.65 KG/M2 | SYSTOLIC BLOOD PRESSURE: 116 MMHG | DIASTOLIC BLOOD PRESSURE: 72 MMHG

## 2018-06-04 DIAGNOSIS — J01.20 SUBACUTE ETHMOIDAL SINUSITIS: Primary | ICD-10-CM

## 2018-06-04 PROCEDURE — 31231 NASAL ENDOSCOPY DX: CPT | Performed by: OTOLARYNGOLOGY

## 2018-06-04 RX ORDER — DOXYCYCLINE HYCLATE 100 MG
100 TABLET ORAL 2 TIMES DAILY
Qty: 14 TABLET | Refills: 0 | Status: SHIPPED | OUTPATIENT
Start: 2018-06-04 | End: 2018-06-19

## 2018-06-19 ENCOUNTER — OFFICE VISIT (OUTPATIENT)
Dept: ENT CLINIC | Age: 73
End: 2018-06-19

## 2018-06-19 ENCOUNTER — HOSPITAL ENCOUNTER (OUTPATIENT)
Dept: OTHER | Age: 73
Discharge: HOME | End: 2018-06-19
Attending: NEUROLOGICAL SURGERY | Admitting: NEUROLOGICAL SURGERY

## 2018-06-19 VITALS — DIASTOLIC BLOOD PRESSURE: 60 MMHG | HEART RATE: 60 BPM | SYSTOLIC BLOOD PRESSURE: 110 MMHG

## 2018-06-19 DIAGNOSIS — J01.00 SUBACUTE MAXILLARY SINUSITIS: Primary | ICD-10-CM

## 2018-06-19 DIAGNOSIS — M54.5 LOW BACK PAIN, UNSPECIFIED BACK PAIN LATERALITY, UNSPECIFIED CHRONICITY, WITH SCIATICA PRESENCE UNSPECIFIED: ICD-10-CM

## 2018-06-19 PROCEDURE — 1123F ACP DISCUSS/DSCN MKR DOCD: CPT | Performed by: OTOLARYNGOLOGY

## 2018-06-19 PROCEDURE — 1036F TOBACCO NON-USER: CPT | Performed by: OTOLARYNGOLOGY

## 2018-06-19 PROCEDURE — G8427 DOCREV CUR MEDS BY ELIG CLIN: HCPCS | Performed by: OTOLARYNGOLOGY

## 2018-06-19 PROCEDURE — 3017F COLORECTAL CA SCREEN DOC REV: CPT | Performed by: OTOLARYNGOLOGY

## 2018-06-19 PROCEDURE — G8417 CALC BMI ABV UP PARAM F/U: HCPCS | Performed by: OTOLARYNGOLOGY

## 2018-06-19 PROCEDURE — 4040F PNEUMOC VAC/ADMIN/RCVD: CPT | Performed by: OTOLARYNGOLOGY

## 2018-06-19 PROCEDURE — G8599 NO ASA/ANTIPLAT THER USE RNG: HCPCS | Performed by: OTOLARYNGOLOGY

## 2018-06-19 PROCEDURE — 99212 OFFICE O/P EST SF 10 MIN: CPT | Performed by: OTOLARYNGOLOGY

## 2018-07-05 ENCOUNTER — OFFICE VISIT (OUTPATIENT)
Dept: FAMILY MEDICINE CLINIC | Age: 73
End: 2018-07-05

## 2018-07-05 VITALS
HEART RATE: 67 BPM | SYSTOLIC BLOOD PRESSURE: 120 MMHG | OXYGEN SATURATION: 94 % | WEIGHT: 281 LBS | BODY MASS INDEX: 38.06 KG/M2 | HEIGHT: 72 IN | DIASTOLIC BLOOD PRESSURE: 70 MMHG

## 2018-07-05 DIAGNOSIS — I10 ESSENTIAL HYPERTENSION: ICD-10-CM

## 2018-07-05 DIAGNOSIS — M54.50 CHRONIC LEFT-SIDED LOW BACK PAIN WITHOUT SCIATICA: ICD-10-CM

## 2018-07-05 DIAGNOSIS — I25.10 CAD IN NATIVE ARTERY: ICD-10-CM

## 2018-07-05 DIAGNOSIS — E78.2 MIXED HYPERLIPIDEMIA: ICD-10-CM

## 2018-07-05 DIAGNOSIS — E11.9 TYPE 2 DIABETES MELLITUS WITHOUT COMPLICATION, WITHOUT LONG-TERM CURRENT USE OF INSULIN (HCC): Primary | ICD-10-CM

## 2018-07-05 DIAGNOSIS — G89.29 CHRONIC LEFT-SIDED LOW BACK PAIN WITHOUT SCIATICA: ICD-10-CM

## 2018-07-05 LAB
CREATININE URINE POCT: 300
HBA1C MFR BLD: 7.1 %
MICROALBUMIN/CREAT 24H UR: 10 MG/G{CREAT}
MICROALBUMIN/CREAT UR-RTO: <30

## 2018-07-05 PROCEDURE — 99214 OFFICE O/P EST MOD 30 MIN: CPT | Performed by: FAMILY MEDICINE

## 2018-07-05 PROCEDURE — 1036F TOBACCO NON-USER: CPT | Performed by: FAMILY MEDICINE

## 2018-07-05 PROCEDURE — G8417 CALC BMI ABV UP PARAM F/U: HCPCS | Performed by: FAMILY MEDICINE

## 2018-07-05 PROCEDURE — G8599 NO ASA/ANTIPLAT THER USE RNG: HCPCS | Performed by: FAMILY MEDICINE

## 2018-07-05 PROCEDURE — 4040F PNEUMOC VAC/ADMIN/RCVD: CPT | Performed by: FAMILY MEDICINE

## 2018-07-05 PROCEDURE — 1123F ACP DISCUSS/DSCN MKR DOCD: CPT | Performed by: FAMILY MEDICINE

## 2018-07-05 PROCEDURE — 3045F PR MOST RECENT HEMOGLOBIN A1C LEVEL 7.0-9.0%: CPT | Performed by: FAMILY MEDICINE

## 2018-07-05 PROCEDURE — 83036 HEMOGLOBIN GLYCOSYLATED A1C: CPT | Performed by: FAMILY MEDICINE

## 2018-07-05 PROCEDURE — 2022F DILAT RTA XM EVC RTNOPTHY: CPT | Performed by: FAMILY MEDICINE

## 2018-07-05 PROCEDURE — 3288F FALL RISK ASSESSMENT DOCD: CPT | Performed by: FAMILY MEDICINE

## 2018-07-05 PROCEDURE — G8427 DOCREV CUR MEDS BY ELIG CLIN: HCPCS | Performed by: FAMILY MEDICINE

## 2018-07-05 PROCEDURE — G8510 SCR DEP NEG, NO PLAN REQD: HCPCS | Performed by: FAMILY MEDICINE

## 2018-07-05 PROCEDURE — 3017F COLORECTAL CA SCREEN DOC REV: CPT | Performed by: FAMILY MEDICINE

## 2018-07-05 PROCEDURE — 82044 UR ALBUMIN SEMIQUANTITATIVE: CPT | Performed by: FAMILY MEDICINE

## 2018-07-05 ASSESSMENT — PATIENT HEALTH QUESTIONNAIRE - PHQ9
SUM OF ALL RESPONSES TO PHQ9 QUESTIONS 1 & 2: 0
SUM OF ALL RESPONSES TO PHQ QUESTIONS 1-9: 0
2. FEELING DOWN, DEPRESSED OR HOPELESS: 0
1. LITTLE INTEREST OR PLEASURE IN DOING THINGS: 0

## 2018-07-05 NOTE — PROGRESS NOTES
Elsy Chapman is a 81 y.o. male. HPI: Here for 3 month complex diabetic follow-up  Taking maximum Actos and metformin and is exercising swimming and is movable to 3 times a day but has not lost weight  Has no chest pain or shortness of breath and has seen cardiology recently awaiting to meet his new cardiologist  Slowly eye doctor this year and had cataracts repaired  No numbness or tingling or open ulcerations or sores on his feet  Has tried Nutrisystem's and is frustrated that he can't get the weight off  Concerned about his potassium is slightly elevated  Has ongoing left buttock pain which is currently stable but he is following up with the neurosurgeon and having an EMG done as he already went through his third epidural steroid injection 3 months ago  Meds, vitamins and allergies reviewed with pt    ROS: No TIA's or unusual headaches, no dysphagia. No prolonged cough. No dyspnea or chest pain on exertion. No abdominal pain, change in bowel habits, black or bloody stools. No urinary tract symptoms. No new or unusual musculoskeletal symptoms. Prior to Visit Medications    Medication Sig Taking?  Authorizing Provider   dapagliflozin (FARXIGA) 10 MG tablet Take 1 tablet by mouth every morning Yes Thuan Noe MD   metFORMIN (GLUCOPHAGE) 1000 MG tablet TAKE ONE TABLET BY MOUTH TWICE A DAY WITH MEALS Yes Thuan Noe MD   pioglitazone (ACTOS) 45 MG tablet TAKE ONE TABLET BY MOUTH DAILY Yes Thuan Noe MD   sildenafil (VIAGRA) 100 MG tablet Take 1 tablet by mouth as needed for Erectile Dysfunction Yes Thuan Noe MD   sildenafil (VIAGRA) 100 MG tablet Take 1 tablet by mouth as needed for Erectile Dysfunction Yes Thuan Noe MD   sildenafil (VIAGRA) 50 MG tablet Take 1 tablet by mouth as needed for Erectile Dysfunction Yes Thuan Noe MD   simvastatin (ZOCOR) 40 MG tablet Take 1 tablet by mouth nightly Yes Laquita Friend, APRN - CNP   metoprolol tartrate (LOPRESSOR) 25 MG tablet Take 0.5 tablets by mouth 2 times daily Yes DARVIN Land CNP   pioglitazone (ACTOS) 30 MG tablet TAKE ONE TABLET BY MOUTH DAILY Yes Rick Ivan MD   lisinopril (ZESTRIL) 2.5 MG tablet Take 1 tablet by mouth every evening Yes Rick Ivan MD   aspirin 325 MG EC tablet Take 1 tablet by mouth daily Yes Rick Ivan MD   glucose monitoring kit (FREESTYLE) monitoring kit 1 kit by Does not apply route daily as needed Yes Rick Ivan MD   glucose blood VI test strips (EXACTECH TEST) strip 1 each by In Vitro route 3 times daily As needed. Yes Rick Ivan MD   Lancets MISC One Touch Verio IQ Pt test TID Yes Rick Ivan MD   Multiple Vitamins-Minerals (THERAPEUTIC MULTIVITAMIN-MINERALS) tablet Take 1 tablet by mouth daily Yes Historical Provider, MD       Past Medical History:   Diagnosis Date    CAD (coronary artery disease)     Cataract 2000    OD    Deviated septum     Erectile dysfunction     Hyperlipidemia     Hypertension     Obesity     Retinal detachment 1991    OD    Type II or unspecified type diabetes mellitus without mention of complication, not stated as uncontrolled     Unspecified sleep apnea        Social History   Substance Use Topics    Smoking status: Former Smoker     Packs/day: 0.10     Years: 1.00     Quit date: 1978    Smokeless tobacco: Never Used    Alcohol use No       Family History   Problem Relation Age of Onset    Cancer Father         liver    Diabetes Father     Stroke Mother     Alzheimer's Disease Mother     Diabetes Brother        No Known Allergies    OBJECTIVE:  /70   Pulse 67   Ht 6' (1.829 m)   Wt 281 lb (127.5 kg)   SpO2 94%   BMI 38.11 kg/m²   GEN:  in NAD, Obese weight up 3 pounds  NECK:  Supple without adenopathy.  No bruits  CV:  Regular rate and rhythm, S1 and S2 normal, no murmurs, clicks  PULM:  Chest is clear, no wheezing ,  symmetric air entry throughout both lung sciatica  Neurosurgery follow-up  I did not address this today specifically    #7 immunizations/health maintenance  AAA screening  No Shingles vaccine discussed and encouraged    Spent 25 minutes with patient greater than 50% of time counseling and coordinating care

## 2018-07-06 ENCOUNTER — TELEPHONE (OUTPATIENT)
Dept: FAMILY MEDICINE CLINIC | Age: 73
End: 2018-07-06

## 2018-07-06 NOTE — TELEPHONE ENCOUNTER
Dr. Leone Loud changed the prescription for Jori Cheadle and when he went to pick it up they said it would cost him $260 and recommended that he contact his PCP to see if we have a discount card. Please call him asap to let him know so that he can pick it up right away.

## 2018-07-09 NOTE — TELEPHONE ENCOUNTER
Patient is calling checking on his change in prescription for Farxiga. He is requiring about a discount card.  Please advise

## 2018-07-10 ENCOUNTER — CARE COORDINATION (OUTPATIENT)
Dept: CARE COORDINATION | Age: 73
End: 2018-07-10

## 2018-07-11 ENCOUNTER — HOSPITAL ENCOUNTER (OUTPATIENT)
Dept: ULTRASOUND IMAGING | Age: 73
Discharge: HOME | End: 2018-07-11
Attending: FAMILY MEDICINE | Admitting: FAMILY MEDICINE

## 2018-07-11 DIAGNOSIS — I10 ESSENTIAL (PRIMARY) HYPERTENSION: ICD-10-CM

## 2018-07-11 DIAGNOSIS — I10 ESSENTIAL HYPERTENSION: ICD-10-CM

## 2018-07-17 ENCOUNTER — CARE COORDINATION (OUTPATIENT)
Dept: CARE COORDINATION | Age: 73
End: 2018-07-17

## 2018-07-17 DIAGNOSIS — E11.9 TYPE 2 DIABETES MELLITUS WITHOUT COMPLICATION, WITHOUT LONG-TERM CURRENT USE OF INSULIN (HCC): Primary | ICD-10-CM

## 2018-07-18 ENCOUNTER — CARE COORDINATION (OUTPATIENT)
Dept: CARE COORDINATION | Age: 73
End: 2018-07-18

## 2018-07-18 NOTE — CARE COORDINATION
Ambulatory Care Coordination Note  7/18/2018  CM Risk Score: 2  Jayde Mortality Risk Score: 3.13    ACC: Darren Quevedo, RN    Summary Note: Writer spoke with pt and will be speaking with him telephonically to assist him apply for [facility]/ Medicare Extra help. Pt is faxing writer remaining needed PAP documents so she can submit his PAP to [employer] for his [address]. Pt state he is not able to afford cost of medication per month ($256). Will speak with pt tomorrow at [phone]. Pt aware. Ambulatory Care Coordination Assessment    Care Coordination Protocol  Program Enrollment:  Rising Risk  Referral from Primary Care Provider:  Yes  Week 1 - Initial Assessment     Do you have all of your prescriptions and are they filled?:  Yes  Barriers to medication adherence:  Other  Other barriers to medication adherence:  cost, difficult to afford [place]- is applying for AZ&Me PAP  Are you able to afford your medications?:  No  How often do you have trouble taking your medications the way you have been told to take them?:  Sometimes I take them as prescribed. Do you have Home O2 Therapy?:  No      Ability to seek help/take action for Emergent Urgent situations i.e. fire, crime, inclement weather or health crisis. :  Independent  Ability to ambulate to restroom:  Independent  Ability handle personal hygeine needs (bathing/dressing/grooming): Independent  Ability to manage Medications: Independent  Ability to prepare Food Preparation:  Independent  Ability to maintain home (clean home, laundry): Independent  Ability to drive and/or has transportation:  Independent  Ability to do shopping:  Independent  Ability to manage finances:   Independent  Is patient able to live independently?:  Yes     Current Housing:  Private Residence        Per the Fall Risk Screening, did the patient have 2 or more falls or 1 fall with injury in the past year?:  No     Frequent urination at night?:  No  Do you use rails/bars?:  No  Do you have a MD   pioglitazone (ACTOS) 30 MG tablet TAKE ONE TABLET BY MOUTH DAILY 8/28/17   Kenneth Canales MD       Future Appointments  Date Time Provider Lisa Petersoni   10/22/2018 9:30 AM Bob Bolanos DO FF Cardio MMA     Joseluis KEVINN, RN Care Coordinator  [address] [place] [address] Primary Care   [phone]

## 2018-07-19 ENCOUNTER — CARE COORDINATION (OUTPATIENT)
Dept: CARE COORDINATION | Age: 73
End: 2018-07-19

## 2018-07-19 NOTE — CARE COORDINATION
Faxed completed AZ&Me application for Alejandro Gregg to [phone]. Outreach call to pt to assist with help him apply for Medicare Extra Help. Pt mailed re entry code if financial changes need to be made (if death of spouse ext). Pt advised he will get a denial or acceptance letter from [facility] Extra Help in next 6-9 weeks. Approval or denial letter will need to be sent to AZ[employer]Me- pt verbalized understanding. Pt aware writer will call him next Wed to notify him if he was approved or denied of AZ&Me medication assistance next Wednesday.      Mike KEVINN, RN Care Coordinator  [address] [place] [address],  [address] [place] [address] Primary Care   [phone]

## 2018-07-25 ENCOUNTER — CARE COORDINATION (OUTPATIENT)
Dept: CARE COORDINATION | Age: 73
End: 2018-07-25

## 2018-07-25 NOTE — CARE COORDINATION
Outreach call to AZ[employer]Me to check status of Farxiga medication. Pt has applied for Medicare Extra help on 7/20/2018. Per wait time (20 minutes- unable to wait length of call time) Will follow up. Outreach call to pt- per pt her received call stating he needed to apply for LIS and he will receive 30 day medication supply- this will need to be clarified due to pt's typically receive 90 day supply due to wait time on LIS (Medicare Extra Help). Will follow up when returned to office.      Lola KEVINN, RN Care Coordinator  [address] [place] [address],  [address] [place] [address] Primary Care   [phone]

## 2018-07-30 ENCOUNTER — CARE COORDINATION (OUTPATIENT)
Dept: CARE COORDINATION | Age: 73
End: 2018-07-30

## 2018-08-13 ENCOUNTER — TELEPHONE (OUTPATIENT)
Dept: FAMILY MEDICINE CLINIC | Age: 73
End: 2018-08-13

## 2018-08-14 NOTE — CARE COORDINATION
Pt called and left message to notify writer after initial LIS was submitted a error was noticed. Will follow up with pt upon return to office week of 8/13/2018.     David KEVINN, RN Care Coordinator  [address] [place] [address] Primary Care   [phone]

## 2018-08-14 NOTE — TELEPHONE ENCOUNTER
Cheapest to get his T dap at [facility] but he is also welcome to get it here if we have it

## 2018-08-17 ENCOUNTER — CARE COORDINATION (OUTPATIENT)
Dept: CARE COORDINATION | Age: 73
End: 2018-08-17

## 2018-08-23 ENCOUNTER — CARE COORDINATION (OUTPATIENT)
Dept: CARE COORDINATION | Age: 73
End: 2018-08-23

## 2018-08-23 DIAGNOSIS — E11.9 TYPE 2 DIABETES MELLITUS WITHOUT COMPLICATION, WITHOUT LONG-TERM CURRENT USE OF INSULIN (HCC): Primary | ICD-10-CM

## 2018-08-23 ASSESSMENT — PATIENT HEALTH QUESTIONNAIRE - PHQ9
SUM OF ALL RESPONSES TO PHQ QUESTIONS 1-9: 0
SUM OF ALL RESPONSES TO PHQ QUESTIONS 1-9: 0

## 2018-08-23 NOTE — CARE COORDINATION
Ambulatory Care Coordination Note  8/23/2018  CM Risk Score: 2  Jayde Mortality Risk Score: 3.13    ACC: Darren Quevedo, RN    Summary Note: Pt called writer. Pt assisted with Medicare Extra Help due to errors with information pt provided to writer- Application resubmitted. Pt requesting samples due to not receiving PAP medications from AZ&Me as of 8/23/2018. Pt aware 3 boxes of samples are available for  (21 tablets). Will plan on next f/u in 3-4 weeks. Pt aware. Outreach call to [employer]Me regarding PAP Lear Carlo). Per Farhat Theodore pt is temporarily enrolled. Proof pt has enrolled needs to be faxed to AZ&Me for next 30 temporarily enrolled medication to be sent to pt. Pt advised he will bring in his temporarily enrollment letter he received from Laurence Sampson later today. Diabetes Assessment    Medic Alert ID:  No  Meal Planning:  Plate Method   How often do you test your blood sugar?:  Daily   Do you have barriers with adherence to non-pharmacologic self-management interventions?  (Nutrition/Exercise/Self-Monitoring):  No   Have you ever had to go to the ED for symptoms of low blood sugar?:  No       Increase or Decrease trend in Blood Sugars   Do you have hyperglycemia symptoms?:  Yes (Comment: depends on diet, glucoses are typcially stable per pt)   Do you have hypoglycemia symptoms?:  No   Last Blood Sugar Value:  138   Blood Sugar Trends:  Fluctuating (Comment: minimal flucuating)          Care Coordination Interventions    Program Enrollment:  Rising Risk  Referral from Primary Care Provider:  Yes  Suggested Interventions and Community Resources  Medication Assistance Program:  In Process (Comment: AZ&Me: Farxiga PAP- plans to fax tomorrow 7/19/2018)  Pharmacist:  Declined  Zone Management Tools:  Completed (Comment: DM Zone management)         Goals Addressed             Most Recent     Patient Stated (pt-stated)   On track (8/23/2018)             I will work with my [facility] RN and ask for help related

## 2018-08-27 ENCOUNTER — TELEPHONE (OUTPATIENT)
Dept: FAMILY MEDICINE CLINIC | Age: 73
End: 2018-08-27

## 2018-08-27 NOTE — TELEPHONE ENCOUNTER
Patient is expecting a new grandchild and wants to know if he is due for TDAP. Also, do we have the vaccine in stock?

## 2018-08-28 ENCOUNTER — CARE COORDINATION (OUTPATIENT)
Dept: CARE COORDINATION | Age: 73
End: 2018-08-28

## 2018-08-30 ASSESSMENT — PATIENT HEALTH QUESTIONNAIRE - PHQ9
SUM OF ALL RESPONSES TO PHQ QUESTIONS 1-9: 0
SUM OF ALL RESPONSES TO PHQ QUESTIONS 1-9: 0

## 2018-08-30 NOTE — CARE COORDINATION
Ambulatory Care Coordination Note  8/30/2018  CM Risk Score: 2  Jayde Mortality Risk Score: 3.13    ACC: Kim Biswas, RN    Summary Note: Spoke with pt regarding PAP for [address]. Per pt he is testing his glucose 2x weekly presently. Pt advised documents dropped off, have been faxed to AZ[employer]Me for proof he has applied for Medicare Extra Help. No other questions or concerns expressed. Will plan on f/u in 2-3 weeks. Pt aware. Diabetes Assessment    Medic Alert ID:  No  Meal Planning:  Plate Method   How often do you test your blood sugar?:  Daily   Do you have barriers with adherence to non-pharmacologic self-management interventions? (Nutrition/Exercise/Self-Monitoring):  No   Have you ever had to go to the ED for symptoms of low blood sugar?:  No       No patient-reported symptoms   Do you have hyperglycemia symptoms?:  No   Do you have hypoglycemia symptoms?:  No   Last Blood Sugar Value:  112   Blood Sugar Trends:  (Comment: 112-140 )          Care Coordination Interventions    Program Enrollment:  Rising Risk  Referral from Primary Care Provider:  Yes  Suggested Interventions and Community Resources  Medication Assistance Program:  In Process (Comment: AZ&Me: Farxiga PAP- temporaily enrolled )  Pharmacist:  Declined  Zone Management Tools:  Completed (Comment: DM Zone management)         Goals Addressed             Most Recent     Patient Stated (pt-stated)   On track (8/28/2018)             I will work with my [facility] RN and ask for help related to inability affording my medications. I will also work towards better improvement with my DM    Barriers: overwhelmed by complexity of regimen and lack of education  Plan for overcoming my barriers: I will call my [facility] Rn with questions or concerns related to my medications- cost and my DM  Confidence: 9/10  Anticipated Goal Completion Date: 2/19/2018            Prior to Admission medications    Medication Sig Start Date End Date Taking?  Authorizing Provider dapagliflozin (FARXIGA) 10 MG tablet Take 1 tablet by mouth every morning 8/23/18  Yes Angelique Cortes MD   metFORMIN (GLUCOPHAGE) 1000 MG tablet TAKE ONE TABLET BY MOUTH TWICE A DAY WITH MEALS 4/5/18  Yes Dominga Lyons MD   simvastatin (ZOCOR) 40 MG tablet Take 1 tablet by mouth nightly 12/18/17  Yes Floridalma Oneil APRN - CNP   metoprolol tartrate (LOPRESSOR) 25 MG tablet Take 0.5 tablets by mouth 2 times daily 10/5/17  Yes Floridalma Oneil APRN - CNP   lisinopril (ZESTRIL) 2.5 MG tablet Take 1 tablet by mouth every evening 7/31/17  Yes Dominga Lyons MD   aspirin 325 MG EC tablet Take 1 tablet by mouth daily 12/14/15  Yes Dominga Lyons MD   glucose monitoring kit (FREESTYLE) monitoring kit 1 kit by Does not apply route daily as needed 8/17/15  Yes Dominga Lyons MD   glucose blood VI test strips (EXACTECH TEST) strip 1 each by In Vitro route 3 times daily As needed.  8/17/15  Yes Dominga Lyons MD   Lancets MISC One Touch Verio IQ Pt test TID 8/17/15  Yes Dominga Lyons MD   Multiple Vitamins-Minerals (THERAPEUTIC MULTIVITAMIN-MINERALS) tablet Take 1 tablet by mouth daily   Yes Historical Provider, MD   dapagliflozin (FARXIGA) 10 MG tablet Take 1 tablet by mouth every morning 7/17/18   Dominga Lyons MD   dapagliflozin (FARXIGA) 10 MG tablet Take 1 tablet by mouth every morning 7/5/18   Dominga Lyons MD   sildenafil (VIAGRA) 100 MG tablet Take 1 tablet by mouth as needed for Erectile Dysfunction 1/31/18   Dominga Lyons MD       Future Appointments  Date Time Provider Lisa Raygoza   10/22/2018 9:30 AM DO MAYELA Nguyễn Cardio MMA   11/14/2018 8:30 AM MD JULI Rincon [facility]     David Diaz BSN, RN Care Coordinator  [address] [place] [address],  [address] [place] [address] Primary Care   [phone]

## 2018-09-20 ENCOUNTER — CARE COORDINATION (OUTPATIENT)
Dept: CARE COORDINATION | Age: 73
End: 2018-09-20

## 2018-09-20 NOTE — CARE COORDINATION
Chart reviewed. Pt has no current needs for ACC. Pt assisted with Medicare Extra Help and is currently enrolled in AZ&Me for medication. ACC will sign off at this time.      Ash SIDHU, RN Care Coordinator  [address] [place] [address] Primary Care   [phone]

## 2018-09-21 DIAGNOSIS — I10 ESSENTIAL HYPERTENSION: ICD-10-CM

## 2018-09-21 RX ORDER — LISINOPRIL 5 MG/1
TABLET ORAL
Qty: 90 TABLET | Refills: 3 | Status: SHIPPED | OUTPATIENT
Start: 2018-09-21 | End: 2018-12-28 | Stop reason: CLARIF

## 2018-10-19 NOTE — PROGRESS NOTES
Mick Cameron MD   Lancets MISC One Touch Verio IQ Pt test TID 8/17/15  Yes Shanice Baker MD       Allergies:  Patient has no known allergies. Review of Systems:    [x]Full ROS obtained and negative except as mentioned in HPI    Physical Examination:    /70 (Site: Right Upper Arm, Position: Sitting, Cuff Size: Large Adult)   Pulse 77   Ht 6' (1.829 m)   Wt 266 lb 14.4 oz (121.1 kg)   SpO2 94%   BMI 36.20 kg/m²   Wt Readings from Last 3 Encounters:   10/22/18 266 lb 14.4 oz (121.1 kg)   07/05/18 281 lb (127.5 kg)   06/04/18 278 lb (126.1 kg)       GENERAL: Well developed, well nourished, no acute distress  NEUROLOGICAL: Alert and oriented x3  PSYCH: Normal mood and affect   SKIN: Warm and dry, without lesions  HEENT: Normocephalic, atraumatic, Sclera non-icteric, mucous membranes moist  NECK: supple, JVP normal, thyroid not enlarged   CAROTID: Normal upstroke, no bruits  CARDIAC: Normal PMI, regular rate and rhythm, normal S1S2, no murmur, rub  RESPIRATORY: Normal respiratory effort, clear to auscultation bilaterally  EXTREMITIES: No cyanosis, clubbing or edema, palpable pulses bilaterally   MUSCULOSKELETAL: No joint swelling or tenderness, no chest wall tenderness  GASTROINTESTINAL:  soft, non-tender, no bruit    Labs:  Lab Review   No visits with results within 2 Month(s) from this visit. 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 No hemodynamically significant carotid stenosis noted on left side. Vertebral flow are antegrade bilaterally.     US for AAA  No

## 2018-10-22 ENCOUNTER — OFFICE VISIT (OUTPATIENT)
Dept: CARDIOLOGY CLINIC | Age: 73
End: 2018-10-22
Payer: MEDICARE

## 2018-10-22 VITALS
OXYGEN SATURATION: 94 % | HEIGHT: 72 IN | DIASTOLIC BLOOD PRESSURE: 70 MMHG | BODY MASS INDEX: 36.15 KG/M2 | WEIGHT: 266.9 LBS | HEART RATE: 77 BPM | SYSTOLIC BLOOD PRESSURE: 120 MMHG

## 2018-10-22 DIAGNOSIS — I25.10 CORONARY ARTERY DISEASE INVOLVING NATIVE CORONARY ARTERY OF NATIVE HEART WITHOUT ANGINA PECTORIS: Primary | ICD-10-CM

## 2018-10-22 DIAGNOSIS — I79.8 OTHER DISORDERS OF ARTERIES, ARTERIOLES AND CAPILLARIES IN DISEASES CLASSIFIED ELSEWHERE (HCC): ICD-10-CM

## 2018-10-22 DIAGNOSIS — E78.2 MIXED HYPERLIPIDEMIA: ICD-10-CM

## 2018-10-22 DIAGNOSIS — E78.00 PURE HYPERCHOLESTEROLEMIA: ICD-10-CM

## 2018-10-22 PROCEDURE — 1123F ACP DISCUSS/DSCN MKR DOCD: CPT | Performed by: INTERNAL MEDICINE

## 2018-10-22 PROCEDURE — G8427 DOCREV CUR MEDS BY ELIG CLIN: HCPCS | Performed by: INTERNAL MEDICINE

## 2018-10-22 PROCEDURE — 1036F TOBACCO NON-USER: CPT | Performed by: INTERNAL MEDICINE

## 2018-10-22 PROCEDURE — G8599 NO ASA/ANTIPLAT THER USE RNG: HCPCS | Performed by: INTERNAL MEDICINE

## 2018-10-22 PROCEDURE — 3017F COLORECTAL CA SCREEN DOC REV: CPT | Performed by: INTERNAL MEDICINE

## 2018-10-22 PROCEDURE — 1101F PT FALLS ASSESS-DOCD LE1/YR: CPT | Performed by: INTERNAL MEDICINE

## 2018-10-22 PROCEDURE — G8417 CALC BMI ABV UP PARAM F/U: HCPCS | Performed by: INTERNAL MEDICINE

## 2018-10-22 PROCEDURE — 99215 OFFICE O/P EST HI 40 MIN: CPT | Performed by: INTERNAL MEDICINE

## 2018-10-22 PROCEDURE — 4040F PNEUMOC VAC/ADMIN/RCVD: CPT | Performed by: INTERNAL MEDICINE

## 2018-10-22 PROCEDURE — G8484 FLU IMMUNIZE NO ADMIN: HCPCS | Performed by: INTERNAL MEDICINE

## 2018-10-22 RX ORDER — ATORVASTATIN CALCIUM 40 MG/1
40 TABLET, FILM COATED ORAL DAILY
Qty: 90 TABLET | Refills: 3 | Status: SHIPPED | OUTPATIENT
Start: 2018-10-22 | End: 2019-11-05 | Stop reason: SDUPTHER

## 2018-10-22 NOTE — LETTER
Erectile Dysfunction 1/31/18  Yes Melissa Bronson MD   aspirin 325 MG EC tablet Take 1 tablet by mouth daily 12/14/15  Yes Melissa Bronson MD   glucose monitoring kit (FREESTYLE) monitoring kit 1 kit by Does not apply route daily as needed 8/17/15  Yes Melissa Bronson MD   glucose blood VI test strips (EXACTECH TEST) strip 1 each by In Vitro route 3 times daily As needed. 8/17/15  Yes Melissa Bronson MD   Lancets MISC One Touch Verio IQ Pt test TID 8/17/15  Yes Melissa Bronson MD       Allergies:  Patient has no known allergies. Review of Systems:    [x]Full ROS obtained and negative except as mentioned in HPI    Physical Examination:    /70 (Site: Right Upper Arm, Position: Sitting, Cuff Size: Large Adult)   Pulse 77   Ht 6' (1.829 m)   Wt 266 lb 14.4 oz (121.1 kg)   SpO2 94%   BMI 36.20 kg/m²    Wt Readings from Last 3 Encounters:   10/22/18 266 lb 14.4 oz (121.1 kg)   07/05/18 281 lb (127.5 kg)   06/04/18 278 lb (126.1 kg)       GENERAL: Well developed, well nourished, no acute distress  NEUROLOGICAL: Alert and oriented x3  PSYCH: Normal mood and affect   SKIN: Warm and dry, without lesions  HEENT: Normocephalic, atraumatic, Sclera non-icteric, mucous membranes moist  NECK: supple, JVP normal, thyroid not enlarged   CAROTID: Normal upstroke, no bruits  CARDIAC: Normal PMI, regular rate and rhythm, normal S1S2, no murmur, rub  RESPIRATORY: Normal respiratory effort, clear to auscultation bilaterally  EXTREMITIES: No cyanosis, clubbing or edema, palpable pulses bilaterally   MUSCULOSKELETAL: No joint swelling or tenderness, no chest wall tenderness  GASTROINTESTINAL:  soft, non-tender, no bruit    Labs:  Lab Review   No visits with results within 2 Month(s) from this visit.    Latest known visit with results is:   Office Visit on 07/05/2018   Component Date Value    Hemoglobin A1C 07/05/2018 7.1     Microalb, Ur 07/05/2018 10     Creatinine Ur POCT 07/05/2018 300

## 2018-10-24 NOTE — COMMUNICATION BODY
months        Thank you for allowing me to participate in the care of your patient. Please do not hesitate to call. Fely Greene D.O., [address] [facility]  Interventional Cardiology     o: [phone]  [address] [place], [place] [address] [address], [address]    NOTE:  This report was transcribed using voice recognition software. Every effort was made to ensure accuracy; however, inadvertent computerized transcription errors may be present. Scribe's Attestation: This note was scribed in the presence of Dr. Conrad Burch DO by EDI Leal.

## 2018-10-29 ENCOUNTER — HOSPITAL ENCOUNTER (OUTPATIENT)
Dept: NON INVASIVE DIAGNOSTICS | Age: 73
Discharge: HOME | End: 2018-10-29
Payer: MEDICARE

## 2018-10-29 ENCOUNTER — HOSPITAL ENCOUNTER (OUTPATIENT)
Dept: VASCULAR LAB | Age: 73
Discharge: HOME | End: 2018-10-29
Payer: MEDICARE

## 2018-10-29 DIAGNOSIS — I25.10 CORONARY ARTERY DISEASE INVOLVING NATIVE CORONARY ARTERY OF NATIVE HEART WITHOUT ANGINA PECTORIS: ICD-10-CM

## 2018-10-29 DIAGNOSIS — E78.2 MIXED HYPERLIPIDEMIA: ICD-10-CM

## 2018-10-29 DIAGNOSIS — E78.00 PURE HYPERCHOLESTEROLEMIA: ICD-10-CM

## 2018-10-29 DIAGNOSIS — R94.39 ABNORMAL FINDING ON CARDIOVASCULAR STRESS TEST: Primary | ICD-10-CM

## 2018-10-29 DIAGNOSIS — I79.8 OTHER DISORDERS OF ARTERIES, ARTERIOLES AND CAPILLARIES IN DISEASES CLASSIFIED ELSEWHERE (HCC): ICD-10-CM

## 2018-10-29 DIAGNOSIS — R94.31 ABNORMAL ELECTROCARDIOGRAM: ICD-10-CM

## 2018-10-29 LAB
LV EF: 59 %
LVEF MODALITY: NORMAL

## 2018-10-29 PROCEDURE — 93017 CV STRESS TEST TRACING ONLY: CPT | Performed by: INTERNAL MEDICINE

## 2018-10-29 PROCEDURE — 78452 HT MUSCLE IMAGE SPECT MULT: CPT | Performed by: INTERNAL MEDICINE

## 2018-10-29 PROCEDURE — A9502 TC99M TETROFOSMIN: HCPCS | Performed by: INTERNAL MEDICINE

## 2018-10-29 PROCEDURE — 3430000000 HC RX DIAGNOSTIC RADIOPHARMACEUTICAL: Performed by: INTERNAL MEDICINE

## 2018-10-29 RX ADMIN — TETROFOSMIN 30 MILLICURIE: 0.23 INJECTION, POWDER, LYOPHILIZED, FOR SOLUTION INTRAVENOUS at 14:27

## 2018-10-29 RX ADMIN — TETROFOSMIN 10 MILLICURIE: 0.23 INJECTION, POWDER, LYOPHILIZED, FOR SOLUTION INTRAVENOUS at 13:14

## 2018-11-14 ENCOUNTER — OFFICE VISIT (OUTPATIENT)
Dept: FAMILY MEDICINE CLINIC | Age: 73
End: 2018-11-14
Payer: MEDICARE

## 2018-11-14 VITALS
BODY MASS INDEX: 36.33 KG/M2 | HEIGHT: 72 IN | SYSTOLIC BLOOD PRESSURE: 126 MMHG | WEIGHT: 268.2 LBS | DIASTOLIC BLOOD PRESSURE: 78 MMHG | OXYGEN SATURATION: 96 % | HEART RATE: 72 BPM

## 2018-11-14 DIAGNOSIS — E78.2 MIXED HYPERLIPIDEMIA: ICD-10-CM

## 2018-11-14 DIAGNOSIS — I10 ESSENTIAL HYPERTENSION: ICD-10-CM

## 2018-11-14 DIAGNOSIS — E11.9 TYPE 2 DIABETES MELLITUS WITHOUT COMPLICATION, WITHOUT LONG-TERM CURRENT USE OF INSULIN (HCC): Primary | ICD-10-CM

## 2018-11-14 DIAGNOSIS — I25.10 CAD IN NATIVE ARTERY: ICD-10-CM

## 2018-11-14 LAB — HBA1C MFR BLD: 7.5 %

## 2018-11-14 PROCEDURE — 3017F COLORECTAL CA SCREEN DOC REV: CPT | Performed by: FAMILY MEDICINE

## 2018-11-14 PROCEDURE — 3045F PR MOST RECENT HEMOGLOBIN A1C LEVEL 7.0-9.0%: CPT | Performed by: FAMILY MEDICINE

## 2018-11-14 PROCEDURE — 1123F ACP DISCUSS/DSCN MKR DOCD: CPT | Performed by: FAMILY MEDICINE

## 2018-11-14 PROCEDURE — 1101F PT FALLS ASSESS-DOCD LE1/YR: CPT | Performed by: FAMILY MEDICINE

## 2018-11-14 PROCEDURE — G8417 CALC BMI ABV UP PARAM F/U: HCPCS | Performed by: FAMILY MEDICINE

## 2018-11-14 PROCEDURE — G8599 NO ASA/ANTIPLAT THER USE RNG: HCPCS | Performed by: FAMILY MEDICINE

## 2018-11-14 PROCEDURE — 99214 OFFICE O/P EST MOD 30 MIN: CPT | Performed by: FAMILY MEDICINE

## 2018-11-14 PROCEDURE — G8427 DOCREV CUR MEDS BY ELIG CLIN: HCPCS | Performed by: FAMILY MEDICINE

## 2018-11-14 PROCEDURE — G8482 FLU IMMUNIZE ORDER/ADMIN: HCPCS | Performed by: FAMILY MEDICINE

## 2018-11-14 PROCEDURE — 4040F PNEUMOC VAC/ADMIN/RCVD: CPT | Performed by: FAMILY MEDICINE

## 2018-11-14 PROCEDURE — 1036F TOBACCO NON-USER: CPT | Performed by: FAMILY MEDICINE

## 2018-11-14 PROCEDURE — 83036 HEMOGLOBIN GLYCOSYLATED A1C: CPT | Performed by: FAMILY MEDICINE

## 2018-11-14 PROCEDURE — 2022F DILAT RTA XM EVC RTNOPTHY: CPT | Performed by: FAMILY MEDICINE

## 2018-11-14 PROCEDURE — G8510 SCR DEP NEG, NO PLAN REQD: HCPCS | Performed by: FAMILY MEDICINE

## 2018-11-14 RX ORDER — LOSARTAN POTASSIUM 25 MG/1
25 TABLET ORAL DAILY
Qty: 30 TABLET | Refills: 11 | Status: SHIPPED | OUTPATIENT
Start: 2018-11-14 | End: 2019-01-04 | Stop reason: SDUPTHER

## 2018-11-14 ASSESSMENT — PATIENT HEALTH QUESTIONNAIRE - PHQ9
2. FEELING DOWN, DEPRESSED OR HOPELESS: 0
SUM OF ALL RESPONSES TO PHQ9 QUESTIONS 1 & 2: 0
SUM OF ALL RESPONSES TO PHQ QUESTIONS 1-9: 0
SUM OF ALL RESPONSES TO PHQ QUESTIONS 1-9: 0
1. LITTLE INTEREST OR PLEASURE IN DOING THINGS: 0

## 2018-11-14 NOTE — PROGRESS NOTES
Lloyd Lopez is a 72 y.o. male. For complex medical follow-up  Him at his new cardiologist had a stress test that was normal, awaiting his echo and carotid Doppler test  She suggested changing his lisinopril due to mild hyperkalemia  Off zocor, off actos, on farxiga and lipitor  Sees an eye doctor once a year   no numbness or tingling in his feet  needs to diabetic supplies ordered   not exercising  Meds, vitamins and allergies reviewed with pt    ROS: No TIA's or unusual headaches, no dysphagia. No prolonged cough. No dyspnea or chest pain on exertion. No abdominal pain, change in bowel habits, black or bloody stools. No urinary tract symptoms. No new or unusual musculoskeletal symptoms. Prior to Visit Medications    Medication Sig Taking? Authorizing Provider   metoprolol tartrate (LOPRESSOR) 25 MG tablet Take 0.5 tablets by mouth 2 times daily Yes Rayray Benson DO   atorvastatin (LIPITOR) 40 MG tablet Take 1 tablet by mouth daily Yes Rayray Benson DO   lisinopril (PRINIVIL;ZESTRIL) 5 MG tablet TAKE ONE-HALF TABLET BY MOUTH DAILY Yes Epifanio Drew MD   dapagliflozin (FARXIGA) 10 MG tablet Take 1 tablet by mouth every morning Yes Madonna Cuenca MD   metFORMIN (GLUCOPHAGE) 1000 MG tablet TAKE ONE TABLET BY MOUTH TWICE A DAY WITH MEALS Yes Epifanio Drew MD   sildenafil (VIAGRA) 100 MG tablet Take 1 tablet by mouth as needed for Erectile Dysfunction Yes Epifanio Drew MD   aspirin 325 MG EC tablet Take 1 tablet by mouth daily Yes Epifanio Drew MD   glucose monitoring kit (FREESTYLE) monitoring kit 1 kit by Does not apply route daily as needed Yes Epifanio Drew MD   glucose blood VI test strips (EXACTECH TEST) strip 1 each by In Vitro route 3 times daily As needed.  Yes Epifanio Drew MD   Lancets MISC One Touch Verio IQ Pt test TID Yes Epifanio Drew MD       Past Medical History:   Diagnosis Date    CAD (coronary artery disease)     Cataract 2000    OD   

## 2018-11-15 ENCOUNTER — TELEPHONE (OUTPATIENT)
Dept: FAMILY MEDICINE CLINIC | Age: 73
End: 2018-11-15

## 2018-11-16 ENCOUNTER — HOSPITAL ENCOUNTER (OUTPATIENT)
Dept: NON INVASIVE DIAGNOSTICS | Age: 73
Discharge: HOME | End: 2018-11-16
Payer: MEDICARE

## 2018-11-16 ENCOUNTER — HOSPITAL ENCOUNTER (OUTPATIENT)
Dept: VASCULAR LAB | Age: 73
Discharge: HOME | End: 2018-11-16
Payer: MEDICARE

## 2018-11-16 DIAGNOSIS — R94.31 ABNORMAL ELECTROCARDIOGRAM: ICD-10-CM

## 2018-11-16 DIAGNOSIS — R94.39 ABNORMAL FINDING ON CARDIOVASCULAR STRESS TEST: ICD-10-CM

## 2018-11-16 DIAGNOSIS — I65.29 STENOSIS OF CAROTID ARTERY, UNSPECIFIED LATERALITY: Primary | ICD-10-CM

## 2018-11-16 DIAGNOSIS — I79.8 OTHER DISORDERS OF ARTERIES, ARTERIOLES AND CAPILLARIES IN DISEASES CLASSIFIED ELSEWHERE (HCC): ICD-10-CM

## 2018-11-16 LAB
LV EF: 43 %
LVEF MODALITY: NORMAL

## 2018-11-16 PROCEDURE — 6360000004 HC RX CONTRAST MEDICATION: Performed by: INTERNAL MEDICINE

## 2018-11-16 PROCEDURE — C8929 TTE W OR WO FOL WCON,DOPPLER: HCPCS

## 2018-11-16 PROCEDURE — 93880 EXTRACRANIAL BILAT STUDY: CPT

## 2018-11-16 RX ADMIN — PERFLUTREN 1.65 MG: 6.52 INJECTION, SUSPENSION INTRAVENOUS at 09:20

## 2018-11-16 NOTE — PROGRESS NOTES
Definity given via IV inserted per Echo protocol. Denies any complaints. Tolerated procedure well. IV dc'd prior to D/C.

## 2018-11-19 ENCOUNTER — TELEPHONE (OUTPATIENT)
Dept: CARDIOLOGY CLINIC | Age: 73
End: 2018-11-19

## 2018-11-19 DIAGNOSIS — I10 ESSENTIAL HYPERTENSION: ICD-10-CM

## 2018-11-19 DIAGNOSIS — I25.10 CORONARY ARTERY DISEASE INVOLVING NATIVE CORONARY ARTERY OF NATIVE HEART WITHOUT ANGINA PECTORIS: Primary | ICD-10-CM

## 2018-11-20 DIAGNOSIS — I10 ESSENTIAL HYPERTENSION: ICD-10-CM

## 2018-11-20 DIAGNOSIS — I25.10 CORONARY ARTERY DISEASE INVOLVING NATIVE CORONARY ARTERY OF NATIVE HEART WITHOUT ANGINA PECTORIS: ICD-10-CM

## 2018-11-20 LAB
ANION GAP SERPL CALCULATED.3IONS-SCNC: 18 MMOL/L (ref 3–16)
BUN BLDV-MCNC: 19 MG/DL (ref 7–20)
CALCIUM SERPL-MCNC: 9 MG/DL (ref 8.3–10.6)
CHLORIDE BLD-SCNC: 105 MMOL/L (ref 99–110)
CO2: 21 MMOL/L (ref 21–32)
CREAT SERPL-MCNC: 0.9 MG/DL (ref 0.8–1.3)
GFR AFRICAN AMERICAN: >60
GFR NON-AFRICAN AMERICAN: >60
GLUCOSE BLD-MCNC: 153 MG/DL (ref 70–99)
POTASSIUM SERPL-SCNC: 4.4 MMOL/L (ref 3.5–5.1)
SODIUM BLD-SCNC: 144 MMOL/L (ref 136–145)

## 2018-12-10 ENCOUNTER — TELEPHONE (OUTPATIENT)
Dept: CARDIOLOGY CLINIC | Age: 73
End: 2018-12-10

## 2018-12-10 DIAGNOSIS — G47.33 OSA (OBSTRUCTIVE SLEEP APNEA): Primary | ICD-10-CM

## 2018-12-10 NOTE — TELEPHONE ENCOUNTER
Referral for [facility] sent. Patient made aware, contact information provided to patient. Instructed to call with questions or concerns.

## 2018-12-19 DIAGNOSIS — E78.00 PURE HYPERCHOLESTEROLEMIA: ICD-10-CM

## 2018-12-19 RX ORDER — SIMVASTATIN 40 MG
TABLET ORAL
Qty: 90 TABLET | Refills: 2 | OUTPATIENT
Start: 2018-12-19

## 2018-12-19 NOTE — TELEPHONE ENCOUNTER
Simvastatin was discontinued at [address] on 10/22/18 by [facility].   Patient changed to atorvastatin

## 2018-12-21 DIAGNOSIS — J01.00 SUBACUTE MAXILLARY SINUSITIS: Primary | ICD-10-CM

## 2018-12-21 RX ORDER — BENZONATATE 100 MG/1
100 CAPSULE ORAL 3 TIMES DAILY PRN
Qty: 25 CAPSULE | Refills: 0 | Status: SHIPPED | OUTPATIENT
Start: 2018-12-21 | End: 2019-05-15

## 2018-12-26 ENCOUNTER — TELEPHONE (OUTPATIENT)
Dept: ENT CLINIC | Age: 73
End: 2018-12-26

## 2018-12-28 ENCOUNTER — OFFICE VISIT (OUTPATIENT)
Dept: FAMILY MEDICINE CLINIC | Age: 73
End: 2018-12-28
Payer: MEDICARE

## 2018-12-28 VITALS
DIASTOLIC BLOOD PRESSURE: 72 MMHG | HEART RATE: 82 BPM | SYSTOLIC BLOOD PRESSURE: 128 MMHG | WEIGHT: 264 LBS | OXYGEN SATURATION: 98 % | BODY MASS INDEX: 35.76 KG/M2 | TEMPERATURE: 97.5 F | HEIGHT: 72 IN

## 2018-12-28 DIAGNOSIS — R09.89 THROAT CLEARING: ICD-10-CM

## 2018-12-28 DIAGNOSIS — J06.9 PROTRACTED URI: Primary | ICD-10-CM

## 2018-12-28 DIAGNOSIS — R09.82 POSTNASAL DRIP: ICD-10-CM

## 2018-12-28 PROCEDURE — G8427 DOCREV CUR MEDS BY ELIG CLIN: HCPCS | Performed by: FAMILY MEDICINE

## 2018-12-28 PROCEDURE — 99213 OFFICE O/P EST LOW 20 MIN: CPT | Performed by: FAMILY MEDICINE

## 2018-12-28 PROCEDURE — 1101F PT FALLS ASSESS-DOCD LE1/YR: CPT | Performed by: FAMILY MEDICINE

## 2018-12-28 PROCEDURE — 1036F TOBACCO NON-USER: CPT | Performed by: FAMILY MEDICINE

## 2018-12-28 PROCEDURE — 4040F PNEUMOC VAC/ADMIN/RCVD: CPT | Performed by: FAMILY MEDICINE

## 2018-12-28 PROCEDURE — 1123F ACP DISCUSS/DSCN MKR DOCD: CPT | Performed by: FAMILY MEDICINE

## 2018-12-28 PROCEDURE — G8599 NO ASA/ANTIPLAT THER USE RNG: HCPCS | Performed by: FAMILY MEDICINE

## 2018-12-28 PROCEDURE — G8482 FLU IMMUNIZE ORDER/ADMIN: HCPCS | Performed by: FAMILY MEDICINE

## 2018-12-28 PROCEDURE — G8417 CALC BMI ABV UP PARAM F/U: HCPCS | Performed by: FAMILY MEDICINE

## 2018-12-28 PROCEDURE — 3017F COLORECTAL CA SCREEN DOC REV: CPT | Performed by: FAMILY MEDICINE

## 2018-12-28 RX ORDER — FLUTICASONE PROPIONATE 50 MCG
2 SPRAY, SUSPENSION (ML) NASAL DAILY
Qty: 1 BOTTLE | Refills: 1 | Status: SHIPPED | OUTPATIENT
Start: 2018-12-28 | End: 2019-05-15

## 2018-12-28 RX ORDER — AMOXICILLIN AND CLAVULANATE POTASSIUM 875; 125 MG/1; MG/1
1 TABLET, FILM COATED ORAL 2 TIMES DAILY
Qty: 14 TABLET | Refills: 0 | Status: SHIPPED | OUTPATIENT
Start: 2018-12-28 | End: 2019-01-04

## 2018-12-28 RX ORDER — LORATADINE 10 MG/1
10 TABLET ORAL DAILY
Qty: 30 TABLET | Refills: 0 | Status: SHIPPED | OUTPATIENT
Start: 2018-12-28 | End: 2019-05-15

## 2018-12-28 NOTE — PROGRESS NOTES
Jean Byrd is a 72 y.o. male. HPI:  Here for URI and cough for 1 week   Called ENT who told him to stop using Sudafed and gave him Tessalon Perles without relief  Continues with + PND, and teeth pressure , HA , clearing throat  took left over PCN for 4 days without relief  Taking nyquil and delsym with continued symptoms  Denies fever  Had flu vaccine and pneumonia vaccine    Meds, vitamins and allergies reviewed with pt  Wt Readings from Last 3 Encounters:   12/28/18 264 lb (119.7 kg)   11/14/18 268 lb 3.2 oz (121.7 kg)   10/22/18 266 lb 14.4 oz (121.1 kg)       REVIEW OF SYSTEMS:   CONSTITUTIONAL: See history of present illness,   Weight noted   HEENT: No new vision difficulties or ringing in the ears. RESPIRATORY: No new SOB, PND, orthopnea or cough. CARDIOVASCULAR: no CP, palpitations or SOB with exertion  GI: No nausea, vomiting, diarrhea, constipation, abdominal pain or changes in bowel habits. : No urinary frequency, urgency, incontinence hematuria or dysuria. SKIN: No cyanosis or skin lesions. MUSCULOSKELETAL: No new muscle or joint pain. NEUROLOGICAL: No syncope or TIA-like symptoms. PSYCHIATRIC: No anxiety, insomnia or depression     No Known Allergies    Prior to Visit Medications    Medication Sig Taking? Authorizing Provider   fluticasone (FLONASE) 50 MCG/ACT nasal spray 2 sprays by Nasal route daily Yes Lachelle Torres MD   amoxicillin-clavulanate (AUGMENTIN) 875-125 MG per tablet Take 1 tablet by mouth 2 times daily for 7 days Yes Lachelle Torres MD   loratadine (CLARITIN) 10 MG tablet Take 1 tablet by mouth daily Yes Lachelle Torres MD   benzonatate (TESSALON PERLES) 100 MG capsule Take 1 capsule by mouth 3 times daily as needed for Cough Yes Ann-Marie Monson MD   blood glucose test strips (ASCENSIA AUTODISC VI;ONE TOUCH ULTRA TEST VI) strip 1 each by In Vitro route daily As needed.  Yes Tanvir Urrutia MD   losartan (COZAAR) 25 MG tablet Take 1 tablet by mouth daily Yes Bilaterally congested/cloudy and throat: And postnasal drip  NECK:  Supple without adenopathy. CV:  Regular rate and rhythm, S1 and S2 normal, no murmurs, clicks  PULM:  Chest is clear, no wheezing ,  symmetric air entry throughout both lung fields. ABD: Soft, NT, obese, no masses appreciated  EXT: No rash or edema  NEURO: Alert oriented ×3, no assistive device    ASSESSMENT/PLAN:  1. Protracted URI  - amoxicillin-clavulanate (AUGMENTIN) 875-125 MG per tablet; Take 1 tablet by mouth 2 times daily for 7 days  Dispense: 14 tablet; Refill: 0 Take antibiotic with food/water and probiotic    Follow up if sxs persist or worsen and for routine care with PCP    2. Postnasal drip  - fluticasone (FLONASE) 50 MCG/ACT nasal spray; 2 sprays by Nasal route daily  Dispense: 1 Bottle; Refill: 1    3. Throat clearing  - fluticasone (FLONASE) 50 MCG/ACT nasal spray; 2 sprays by Nasal route daily  Dispense: 1 Bottle; Refill: 1  - loratadine (CLARITIN) 10 MG tablet; Take 1 tablet by mouth daily  Dispense: 30 tablet;  Refill: 0      Follow up if sxs persist or worsen and for routine care with PCP

## 2019-01-04 RX ORDER — LOSARTAN POTASSIUM 25 MG/1
25 TABLET ORAL DAILY
Qty: 90 TABLET | Refills: 3 | Status: SHIPPED | OUTPATIENT
Start: 2019-01-04 | End: 2019-05-15

## 2019-02-26 ENCOUNTER — OFFICE VISIT (OUTPATIENT)
Dept: PULMONOLOGY | Age: 74
End: 2019-02-26
Payer: MEDICARE

## 2019-02-26 VITALS
OXYGEN SATURATION: 98 % | SYSTOLIC BLOOD PRESSURE: 132 MMHG | BODY MASS INDEX: 36.16 KG/M2 | DIASTOLIC BLOOD PRESSURE: 72 MMHG | HEART RATE: 63 BPM | WEIGHT: 267 LBS | HEIGHT: 72 IN

## 2019-02-26 DIAGNOSIS — E66.9 NON MORBID OBESITY, UNSPECIFIED OBESITY TYPE: Chronic | ICD-10-CM

## 2019-02-26 DIAGNOSIS — G47.33 OBSTRUCTIVE SLEEP APNEA SYNDROME: Primary | ICD-10-CM

## 2019-02-26 DIAGNOSIS — I10 ESSENTIAL HYPERTENSION: Chronic | ICD-10-CM

## 2019-02-26 DIAGNOSIS — E11.9 TYPE 2 DIABETES MELLITUS WITHOUT COMPLICATION, WITHOUT LONG-TERM CURRENT USE OF INSULIN (HCC): Chronic | ICD-10-CM

## 2019-02-26 DIAGNOSIS — I25.10 CORONARY ARTERY DISEASE INVOLVING NATIVE CORONARY ARTERY OF NATIVE HEART WITHOUT ANGINA PECTORIS: Chronic | ICD-10-CM

## 2019-02-26 PROCEDURE — 1036F TOBACCO NON-USER: CPT | Performed by: INTERNAL MEDICINE

## 2019-02-26 PROCEDURE — G8482 FLU IMMUNIZE ORDER/ADMIN: HCPCS | Performed by: INTERNAL MEDICINE

## 2019-02-26 PROCEDURE — G8417 CALC BMI ABV UP PARAM F/U: HCPCS | Performed by: INTERNAL MEDICINE

## 2019-02-26 PROCEDURE — G8599 NO ASA/ANTIPLAT THER USE RNG: HCPCS | Performed by: INTERNAL MEDICINE

## 2019-02-26 PROCEDURE — 4040F PNEUMOC VAC/ADMIN/RCVD: CPT | Performed by: INTERNAL MEDICINE

## 2019-02-26 PROCEDURE — 1123F ACP DISCUSS/DSCN MKR DOCD: CPT | Performed by: INTERNAL MEDICINE

## 2019-02-26 PROCEDURE — 2022F DILAT RTA XM EVC RTNOPTHY: CPT | Performed by: INTERNAL MEDICINE

## 2019-02-26 PROCEDURE — 99204 OFFICE O/P NEW MOD 45 MIN: CPT | Performed by: INTERNAL MEDICINE

## 2019-02-26 PROCEDURE — 3017F COLORECTAL CA SCREEN DOC REV: CPT | Performed by: INTERNAL MEDICINE

## 2019-02-26 PROCEDURE — 1101F PT FALLS ASSESS-DOCD LE1/YR: CPT | Performed by: INTERNAL MEDICINE

## 2019-02-26 PROCEDURE — G8427 DOCREV CUR MEDS BY ELIG CLIN: HCPCS | Performed by: INTERNAL MEDICINE

## 2019-02-26 PROCEDURE — 3046F HEMOGLOBIN A1C LEVEL >9.0%: CPT | Performed by: INTERNAL MEDICINE

## 2019-02-26 ASSESSMENT — SLEEP AND FATIGUE QUESTIONNAIRES
HOW LIKELY ARE YOU TO NOD OFF OR FALL ASLEEP WHILE SITTING AND READING: 0
HOW LIKELY ARE YOU TO NOD OFF OR FALL ASLEEP WHILE SITTING AND TALKING TO SOMEONE: 0
HOW LIKELY ARE YOU TO NOD OFF OR FALL ASLEEP WHILE SITTING INACTIVE IN A PUBLIC PLACE: 0
HOW LIKELY ARE YOU TO NOD OFF OR FALL ASLEEP WHEN YOU ARE A PASSENGER IN A CAR FOR AN HOUR WITHOUT A BREAK: 0
HOW LIKELY ARE YOU TO NOD OFF OR FALL ASLEEP IN A CAR, WHILE STOPPED FOR A FEW MINUTES IN TRAFFIC: 0
HOW LIKELY ARE YOU TO NOD OFF OR FALL ASLEEP WHILE SITTING QUIETLY AFTER LUNCH WITHOUT ALCOHOL: 0
HOW LIKELY ARE YOU TO NOD OFF OR FALL ASLEEP WHILE WATCHING TV: 0
NECK CIRCUMFERENCE (INCHES): 17.5
ESS TOTAL SCORE: 0
HOW LIKELY ARE YOU TO NOD OFF OR FALL ASLEEP WHILE LYING DOWN TO REST IN THE AFTERNOON WHEN CIRCUMSTANCES PERMIT: 0

## 2019-02-26 ASSESSMENT — ENCOUNTER SYMPTOMS
SHORTNESS OF BREATH: 0
EYE PAIN: 0
APNEA: 0
VOMITING: 0
ABDOMINAL PAIN: 0
RHINORRHEA: 0
PHOTOPHOBIA: 0
ABDOMINAL DISTENTION: 0
NAUSEA: 0
CHOKING: 0
CHEST TIGHTNESS: 0
ALLERGIC/IMMUNOLOGIC NEGATIVE: 1

## 2019-04-03 DIAGNOSIS — E11.9 TYPE 2 DIABETES MELLITUS WITHOUT COMPLICATION (HCC): ICD-10-CM

## 2019-04-08 ENCOUNTER — TELEPHONE (OUTPATIENT)
Dept: FAMILY MEDICINE CLINIC | Age: 74
End: 2019-04-08

## 2019-04-09 ENCOUNTER — CARE COORDINATION (OUTPATIENT)
Dept: CARE COORDINATION | Age: 74
End: 2019-04-09

## 2019-04-09 NOTE — CARE COORDINATION
ACC returned call to pt. Pt reiterated AZ&Me PAP. Pt advised per application he would still be denied assistance until he meets his 3% out of pocket expenses. ACC encouraged pt to review PAP and call AZ&Me with further questions pertaining to application process. Pt stated he only has 6 pills left. [facility] provided options for pt- pt encouraged to call his insurance to ask what alternatives are covered with better tier coverage in place of his [place]. Pt assisted with 6 month DM f/u appt with PCP for a1c recheck (see below). Results for Alesia Rinaldi (MRN [id number]) as of 4/9/2019 08:28   Ref.  Range 4/4/2018 09:32 7/5/2018 08:45 11/14/2018 09:16   Hemoglobin A1C Latest Units: % 7.6 7.1 7.5     FU appts/Provider:    Future Appointments   Date Time Provider Department Center   5/14/2019  2:20 PM Karoline Martinez MD FF SLEEP MED [facility]   5/15/2019  9:00 AM Rick Ivan MD [facility]   5/20/2019  8:15 AM Katie Post DO FF Cardio [facility]     Elias SIDHU, RN Care Coordinator  [address] [place] [address],  [address] [place] [address] Primary Care   [phone]

## 2019-04-09 NOTE — TELEPHONE ENCOUNTER
ACC returned call to pt. PAP reviewed- pt advised he will need to spend 3% out of pocket prior to applying. Pt encouraged to call insurance and ask what alternatives are covered under his insurance. Please close encounter.

## 2019-04-11 DIAGNOSIS — E11.9 TYPE 2 DIABETES MELLITUS WITHOUT COMPLICATION, WITHOUT LONG-TERM CURRENT USE OF INSULIN (HCC): Primary | Chronic | ICD-10-CM

## 2019-04-11 NOTE — TELEPHONE ENCOUNTER
Medication and Quantity requested:  dapagliflozin (FARXIGA) 10 MG tablet - qty 90        Last Visit  12/28/18 - Dr Domingo Bagley and phone number updated in EPIC:  Yes    (PATIENT REQUESTED [address])

## 2019-04-11 NOTE — TELEPHONE ENCOUNTER
LOV: 12/28/18    LRF: not yet filled- pt was to call insurance.  Pt requesting this now be sent to [address] per message below    Next Ov: 5/15/19    Lab Results   Component Value Date    LABA1C 7.5 11/14/2018     Lab Results   Component Value Date    .8 06/15/2017

## 2019-04-16 RX ORDER — TELMISARTAN 20 MG/1
20 TABLET ORAL DAILY
Qty: 30 TABLET | Refills: 3 | Status: SHIPPED | OUTPATIENT
Start: 2019-04-16 | End: 2019-06-12 | Stop reason: SDUPTHER

## 2019-05-13 NOTE — PROGRESS NOTES
Medications:  [x] Medications and dosages reviewed. Prior to Admission medications    Medication Sig Start Date End Date Taking? Authorizing Provider   sildenafil (VIAGRA) 100 MG tablet Take 1 tablet by mouth as needed for Erectile Dysfunction 5/15/19  Yes Basia Aldana MD   telmisartan (MICARDIS) 20 MG tablet Take 1 tablet by mouth daily 4/16/19  Yes Basia Aldana MD   dapagliflozin (FARXIGA) 10 MG tablet Take 1 tablet by mouth every morning 4/11/19  Yes Basia Aldana MD   metFORMIN (GLUCOPHAGE) 1000 MG tablet TAKE ONE TABLET BY MOUTH TWICE A DAY WITH MEALS 4/3/19  Yes Basia Aldana MD   blood glucose test strips (ASCENSIA AUTODISC VI;ONE TOUCH ULTRA TEST VI) strip 1 each by In Vitro route daily As needed. 11/16/18  Yes Basia Aldana MD   metoprolol tartrate (LOPRESSOR) 25 MG tablet Take 0.5 tablets by mouth 2 times daily 10/22/18  Yes Esthela Wiley DO   atorvastatin (LIPITOR) 40 MG tablet Take 1 tablet by mouth daily 10/22/18  Yes Esthela Wiley DO   aspirin 325 MG EC tablet Take 1 tablet by mouth daily 12/14/15  Yes Basia Aldana MD   glucose blood VI test strips (EXACTECH TEST) strip 1 each by In Vitro route 3 times daily As needed. 8/17/15  Yes Basia Aldana MD   Lancets MISC One Touch Verio IQ Pt test TID 8/17/15  Yes Basia Aldana MD   glucose monitoring kit (FREESTYLE) monitoring kit 1 kit by Does not apply route daily as needed 8/17/15   Basia Aldana MD       Allergies:  Patient has no known allergies.      Review of Systems:    [x]Full ROS obtained and negative except as mentioned in HPI    Physical Examination:    /66 (Site: Right Upper Arm, Position: Sitting, Cuff Size: Large Adult)   Pulse 77   Ht 6' (1.829 m)   Wt 255 lb 12.8 oz (116 kg)   SpO2 93%   BMI 34.69 kg/m²   Wt Readings from Last 3 Encounters:   05/20/19 255 lb 12.8 oz (116 kg)   05/15/19 253 lb 6.4 oz (114.9 kg)   05/14/19 258 lb (117 kg)       GENERAL: Well developed, well nourished, no acute distress  NEUROLOGICAL: Alert and oriented x3  PSYCH: Normal mood and affect   SKIN: Warm and dry, without lesions  HEENT: Normocephalic, atraumatic, Sclera non-icteric, mucous membranes moist  NECK: supple, JVP normal, thyroid not enlarged   CAROTID: Normal upstroke, no bruits  CARDIAC: Normal PMI, regular rate and rhythm, normal S1S2, no murmur, rub  RESPIRATORY: Normal respiratory effort, clear to auscultation bilaterally  EXTREMITIES: No cyanosis, clubbing or edema, palpable pulses bilaterally   MUSCULOSKELETAL: No joint swelling or tenderness, no chest wall tenderness  GASTROINTESTINAL:  soft, non-tender, no bruit    Labs:  Lab Review   Orders Only on 05/15/2019   Component Date Value    Cholesterol, Total 05/15/2019 162     Triglycerides 05/15/2019 200*    HDL 05/15/2019 41     LDL Calculated 05/15/2019 81     VLDL Cholesterol Calcula* 05/15/2019 40    Office Visit on 05/15/2019   Component Date Value    Hemoglobin A1C 05/15/2019 7.5      Imaging:  I have reviewed the below testing personally:  Echo 11/16/18   Summary   -DEFINITY was used to better visualize left ventricle.   -Normal left ventricle size and wall thickness. Ejection fraction is   visually estimated to be 40-45%. Global hypokinesis more marked at the apex.   -Mild mitral regurgitation.   -Mild tricuspid regurgitation with RVSP of 30 mmHg. Stress Test 10/29/18   Summary    Normal LV size and systolic function.    Left ventricular ejection fraction of 59 %.    There is a large sized, moderate intensity, completely fixed defect    inferolaterally, inferoapically. There appears to be mild hypokinesis in    these segments but is also with diaphragmatic attenuation artifact.    No evidence of myocardium at risk for significant reversible ischemia.        CATH:   8/9/15 Multivessel disease  8/14/15 CABG x 3 LIMA-LAD, SV-OM2, SV-PDA; left [facility]     VASCULAR:   Carotids 11/16/18   -The right internal carotid artery appears to have a <50% diameter reducing    stenosis based on velocity criteria. Minimal plaque.    -The left internal carotid artery demonstrates no significant stenosis. US for AAA  No evidence of abdominal aortic aneurysm. Impression/Recommendations    Mr. Tiffanie Mora is a 72 y.o. male patient with:    1. Stable Coronary artery disease (8/14/15 CABG x 3 LIMA-LAD, SV-OM2, SV-PDA) (October 2018 SPECT MPI without ischemia) ; ICMP (LVEF 40-45% without clinical heart failure)   2. Mixed hyperlipidemia, stable: 5/5/19   LDL 81 HDL 41.   3. Nonobstructive carotid artery disease  4. Diabetes mellitus, non insulin dependent (A1C 11/2018 7.5)  5. Hypertension, controlled    6. Obesity   7. Chronic back pain  8. FRANCISCO    The following cardiovascular specific medications were confirmed at this visit:     Telmisartan 20 mg daily (Off lisinopril 2/2 hyperkalemia)  Metoprolol Tartrate 25 mg BID  Atorvastatin 40 mg daily   mg    BB and ARB remain as first choice antihypertensives for CMP purposes as well. Stacie Mullen leads a Functional Class I lifestyle but is looking to dieting (he mentions [facility]) and low impact exercise as his weight is at a  plateau - discussed diabetes control/central obesity/ hypertriglyceridemia. Return in about 1 year (around 5/20/2020). Patient Instructions   No medication changes  Follow up in 1 year    Thank you for allowing me to participate in the care of your patient. Please do not hesitate to call. Pauline Dozier D.O., [facility]  Interventional Cardiology     o: [phone]  [address] [place] [address] [address] [facility], [address]    NOTE:  This report was transcribed using voice recognition software. Every effort was made to ensure accuracy; however, inadvertent computerized transcription errors may be present. Scribe's Attestation: This note was scribed in the presence of Dr. Jessie Whitfield DO by EDI Leal.     The above documentation has been prepared under my direction and personally reviewed by me in its entirety. I confirm that the note above accurately reflects all work, treatment, procedures, and medical decision making performed by me. An electronic signature was used to authenticate this note.

## 2019-05-14 ENCOUNTER — OFFICE VISIT (OUTPATIENT)
Dept: PULMONOLOGY | Age: 74
End: 2019-05-14
Payer: MEDICARE

## 2019-05-14 VITALS
DIASTOLIC BLOOD PRESSURE: 70 MMHG | OXYGEN SATURATION: 97 % | BODY MASS INDEX: 34.95 KG/M2 | HEART RATE: 81 BPM | HEIGHT: 72 IN | SYSTOLIC BLOOD PRESSURE: 132 MMHG | WEIGHT: 258 LBS

## 2019-05-14 DIAGNOSIS — E66.9 NON MORBID OBESITY, UNSPECIFIED OBESITY TYPE: Chronic | ICD-10-CM

## 2019-05-14 DIAGNOSIS — G47.33 OBSTRUCTIVE SLEEP APNEA SYNDROME: Chronic | ICD-10-CM

## 2019-05-14 DIAGNOSIS — I10 ESSENTIAL HYPERTENSION: Chronic | ICD-10-CM

## 2019-05-14 DIAGNOSIS — E11.9 TYPE 2 DIABETES MELLITUS WITHOUT COMPLICATION, WITHOUT LONG-TERM CURRENT USE OF INSULIN (HCC): Chronic | ICD-10-CM

## 2019-05-14 DIAGNOSIS — I25.10 CORONARY ARTERY DISEASE INVOLVING NATIVE CORONARY ARTERY OF NATIVE HEART WITHOUT ANGINA PECTORIS: Chronic | ICD-10-CM

## 2019-05-14 PROCEDURE — 99214 OFFICE O/P EST MOD 30 MIN: CPT | Performed by: INTERNAL MEDICINE

## 2019-05-14 PROCEDURE — 4040F PNEUMOC VAC/ADMIN/RCVD: CPT | Performed by: INTERNAL MEDICINE

## 2019-05-14 PROCEDURE — G8417 CALC BMI ABV UP PARAM F/U: HCPCS | Performed by: INTERNAL MEDICINE

## 2019-05-14 PROCEDURE — G8427 DOCREV CUR MEDS BY ELIG CLIN: HCPCS | Performed by: INTERNAL MEDICINE

## 2019-05-14 PROCEDURE — 3017F COLORECTAL CA SCREEN DOC REV: CPT | Performed by: INTERNAL MEDICINE

## 2019-05-14 PROCEDURE — 3046F HEMOGLOBIN A1C LEVEL >9.0%: CPT | Performed by: INTERNAL MEDICINE

## 2019-05-14 PROCEDURE — G8599 NO ASA/ANTIPLAT THER USE RNG: HCPCS | Performed by: INTERNAL MEDICINE

## 2019-05-14 PROCEDURE — 1123F ACP DISCUSS/DSCN MKR DOCD: CPT | Performed by: INTERNAL MEDICINE

## 2019-05-14 PROCEDURE — 1036F TOBACCO NON-USER: CPT | Performed by: INTERNAL MEDICINE

## 2019-05-14 PROCEDURE — 2022F DILAT RTA XM EVC RTNOPTHY: CPT | Performed by: INTERNAL MEDICINE

## 2019-05-14 ASSESSMENT — SLEEP AND FATIGUE QUESTIONNAIRES
HOW LIKELY ARE YOU TO NOD OFF OR FALL ASLEEP WHILE WATCHING TV: 0
HOW LIKELY ARE YOU TO NOD OFF OR FALL ASLEEP WHILE LYING DOWN TO REST IN THE AFTERNOON WHEN CIRCUMSTANCES PERMIT: 0
HOW LIKELY ARE YOU TO NOD OFF OR FALL ASLEEP WHILE SITTING AND TALKING TO SOMEONE: 0
HOW LIKELY ARE YOU TO NOD OFF OR FALL ASLEEP IN A CAR, WHILE STOPPED FOR A FEW MINUTES IN TRAFFIC: 0
HOW LIKELY ARE YOU TO NOD OFF OR FALL ASLEEP WHILE SITTING AND READING: 0
HOW LIKELY ARE YOU TO NOD OFF OR FALL ASLEEP WHILE SITTING QUIETLY AFTER LUNCH WITHOUT ALCOHOL: 0
ESS TOTAL SCORE: 0
HOW LIKELY ARE YOU TO NOD OFF OR FALL ASLEEP WHEN YOU ARE A PASSENGER IN A CAR FOR AN HOUR WITHOUT A BREAK: 0
HOW LIKELY ARE YOU TO NOD OFF OR FALL ASLEEP WHILE SITTING INACTIVE IN A PUBLIC PLACE: 0

## 2019-05-14 ASSESSMENT — ENCOUNTER SYMPTOMS
SINUS PRESSURE: 0
SHORTNESS OF BREATH: 0
CHEST TIGHTNESS: 0
PHOTOPHOBIA: 0
EYE PAIN: 0
STRIDOR: 0

## 2019-05-14 NOTE — PROGRESS NOTES
Mis Alonzo MD, FAA, [address]  [facility]  3rd Floor, [address] [facility], [address] [phone]   [address] [place] [address] Dr Edmund Franco . Sheila Lynn [address] [phone] [address] [facility]  [address] [place] [id number] [phone]    Assessment/Plan:     Obstructive sleep apnea syndrome  Chronic- Stable. Reviewed compliance download with pt. Supplies and parts as needed for his machine. These are medically necessary. Continue medications per his PCP and other physicians. Limit caffeine use after 3pm.    CAD (coronary artery disease)  Chronic- Stable. Cont meds per PCP and other physicians. Essential hypertension  Chronic- Stable. Cont meds per PCP and other physicians. Type 2 diabetes mellitus without complication (HCC)  Chronic- Stable. Cont meds per PCP and other physicians. Non morbid obesity, unspecified obesity type  Chronic-Stable. Encouraged him to work on weight loss through diet and exercise. Diagnoses of Obstructive sleep apnea syndrome, Coronary artery disease involving native coronary artery of native heart without angina pectoris, Essential hypertension, Type 2 diabetes mellitus without complication, without long-term current use of insulin ([facility] [address] and Non morbid obesity, unspecified obesity type were pertinent to this visit. The chronic medical conditions listed are directly related to the primary diagnosis listed above. The management of the primary diagnosis affects the secondary diagnosis and vice versa. Subjective:     Patient ID: Richa Cerna is a 72 y.o. male.     Chief Complaint   Patient presents with    Sleep Apnea     Subjective   HPI:    Machine Modem/Download Info:  Compliance (hours/night): 7.25 hrs/night  Download AHI (/hour): 1.3 /HR  Average CPAP Pressure : 13.1 cmH2O APAP - Settings  Pressure Min: 12 cmH2O  Pressure Max: 20 05/14/19 81   02/26/19 63   12/28/18 82    SpO2 Readings from Last 3 Encounters:   05/14/19 97%   02/26/19 98%   12/28/18 98%          Electronically signed by Delilah Vaughn MD on 5/14/2019 at 2:33 PM

## 2019-05-15 ENCOUNTER — OFFICE VISIT (OUTPATIENT)
Dept: FAMILY MEDICINE CLINIC | Age: 74
End: 2019-05-15
Payer: MEDICARE

## 2019-05-15 VITALS
HEART RATE: 85 BPM | TEMPERATURE: 96.6 F | BODY MASS INDEX: 34.32 KG/M2 | DIASTOLIC BLOOD PRESSURE: 78 MMHG | WEIGHT: 253.4 LBS | HEIGHT: 72 IN | OXYGEN SATURATION: 98 % | SYSTOLIC BLOOD PRESSURE: 138 MMHG

## 2019-05-15 DIAGNOSIS — I79.8 OTHER DISORDERS OF ARTERIES, ARTERIOLES AND CAPILLARIES IN DISEASES CLASSIFIED ELSEWHERE (HCC): ICD-10-CM

## 2019-05-15 DIAGNOSIS — E78.2 MIXED HYPERLIPIDEMIA: Chronic | ICD-10-CM

## 2019-05-15 DIAGNOSIS — E66.9 NON MORBID OBESITY, UNSPECIFIED OBESITY TYPE: Chronic | ICD-10-CM

## 2019-05-15 DIAGNOSIS — I25.10 CORONARY ARTERY DISEASE INVOLVING NATIVE CORONARY ARTERY OF NATIVE HEART WITHOUT ANGINA PECTORIS: Chronic | ICD-10-CM

## 2019-05-15 DIAGNOSIS — G47.33 OBSTRUCTIVE SLEEP APNEA SYNDROME: Chronic | ICD-10-CM

## 2019-05-15 DIAGNOSIS — I10 ESSENTIAL HYPERTENSION: Primary | Chronic | ICD-10-CM

## 2019-05-15 DIAGNOSIS — E11.9 TYPE 2 DIABETES MELLITUS WITHOUT COMPLICATION, WITHOUT LONG-TERM CURRENT USE OF INSULIN (HCC): Chronic | ICD-10-CM

## 2019-05-15 LAB
CHOLESTEROL, TOTAL: 162 MG/DL (ref 0–199)
HBA1C MFR BLD: 7.5 %
HDLC SERPL-MCNC: 41 MG/DL (ref 40–60)
LDL CHOLESTEROL CALCULATED: 81 MG/DL
TRIGL SERPL-MCNC: 200 MG/DL (ref 0–150)
VLDLC SERPL CALC-MCNC: 40 MG/DL

## 2019-05-15 PROCEDURE — 1036F TOBACCO NON-USER: CPT | Performed by: FAMILY MEDICINE

## 2019-05-15 PROCEDURE — 4040F PNEUMOC VAC/ADMIN/RCVD: CPT | Performed by: FAMILY MEDICINE

## 2019-05-15 PROCEDURE — G8599 NO ASA/ANTIPLAT THER USE RNG: HCPCS | Performed by: FAMILY MEDICINE

## 2019-05-15 PROCEDURE — G8427 DOCREV CUR MEDS BY ELIG CLIN: HCPCS | Performed by: FAMILY MEDICINE

## 2019-05-15 PROCEDURE — 99214 OFFICE O/P EST MOD 30 MIN: CPT | Performed by: FAMILY MEDICINE

## 2019-05-15 PROCEDURE — 3045F PR MOST RECENT HEMOGLOBIN A1C LEVEL 7.0-9.0%: CPT | Performed by: FAMILY MEDICINE

## 2019-05-15 PROCEDURE — G8417 CALC BMI ABV UP PARAM F/U: HCPCS | Performed by: FAMILY MEDICINE

## 2019-05-15 PROCEDURE — 2022F DILAT RTA XM EVC RTNOPTHY: CPT | Performed by: FAMILY MEDICINE

## 2019-05-15 PROCEDURE — 3017F COLORECTAL CA SCREEN DOC REV: CPT | Performed by: FAMILY MEDICINE

## 2019-05-15 PROCEDURE — 83036 HEMOGLOBIN GLYCOSYLATED A1C: CPT | Performed by: FAMILY MEDICINE

## 2019-05-15 PROCEDURE — 1123F ACP DISCUSS/DSCN MKR DOCD: CPT | Performed by: FAMILY MEDICINE

## 2019-05-15 RX ORDER — SILDENAFIL 100 MG/1
100 TABLET, FILM COATED ORAL PRN
Qty: 10 TABLET | Refills: 3 | Status: SHIPPED | OUTPATIENT
Start: 2019-05-15 | End: 2019-11-22

## 2019-05-15 ASSESSMENT — PATIENT HEALTH QUESTIONNAIRE - PHQ9
SUM OF ALL RESPONSES TO PHQ QUESTIONS 1-9: 0
SUM OF ALL RESPONSES TO PHQ9 QUESTIONS 1 & 2: 0
2. FEELING DOWN, DEPRESSED OR HOPELESS: 0
1. LITTLE INTEREST OR PLEASURE IN DOING THINGS: 0
SUM OF ALL RESPONSES TO PHQ QUESTIONS 1-9: 0

## 2019-05-15 NOTE — PROGRESS NOTES
Donovan Pope is a 72 y.o. male. HPI:here for complex medical visit  sees cardio, sleep, derm , opthal  Low back pain is better last 10 mo after duran, wt loss  Able to golf  Mild ED responds well to Viagra and would like a refill  50 years after serving in [employer] he is starting to have flashbacks and is considering counseling for PTSD at the [address] [place] [address], vitamins and allergies reviewed with pt    ROS: No TIA's or unusual headaches, no dysphagia. No prolonged cough. No dyspnea or chest pain on exertion. No abdominal pain, change in bowel habits, black or bloody stools. No urinary tract symptoms. No new or unusual musculoskeletal symptoms. Prior to Visit Medications    Medication Sig Taking? Authorizing Provider   telmisartan (MICARDIS) 20 MG tablet Take 1 tablet by mouth daily  Basia Aldana MD   dapagliflozin (FARXIGA) 10 MG tablet Take 1 tablet by mouth every morning  Basia Aldana MD   metFORMIN (GLUCOPHAGE) 1000 MG tablet TAKE ONE TABLET BY MOUTH TWICE A DAY WITH MEALS  Basia Aldana MD   blood glucose test strips (ASCENSIA AUTODISC VI;ONE TOUCH ULTRA TEST VI) strip 1 each by In Vitro route daily As needed. Basia Aldana MD   metoprolol tartrate (LOPRESSOR) 25 MG tablet Take 0.5 tablets by mouth 2 times daily  Esthela Wiley, DO   atorvastatin (LIPITOR) 40 MG tablet Take 1 tablet by mouth daily  Esthela Wiley, DO   sildenafil (VIAGRA) 100 MG tablet Take 1 tablet by mouth as needed for Erectile Dysfunction  Basia Aldana MD   aspirin 325 MG EC tablet Take 1 tablet by mouth daily  Basia lAdana MD   glucose monitoring kit (FREESTYLE) monitoring kit 1 kit by Does not apply route daily as needed  Basia Aldana MD   glucose blood VI test strips (EXACTECH TEST) strip 1 each by In Vitro route 3 times daily As needed.   Basia Aldana MD   Lancets MISC One Touch Verio IQ Pt test TID  Basia Aldana MD       Past Medical History:   Diagnosis Date    CAD (coronary artery disease)     Cataract     OD    Deviated septum     Erectile dysfunction     Hyperlipidemia     Hypertension     Obesity     Obstructive sleep apnea syndrome 2019    Needs work up   Angela Specter Retinal detachment     OD    Type II or unspecified type diabetes mellitus without mention of complication, not stated as uncontrolled     Unspecified sleep apnea        Social History     Tobacco Use    Smoking status: Former Smoker     Packs/day: 0.10     Years: 1.00     Pack years: 0.10     Last attempt to quit:      Years since quittin.3    Smokeless tobacco: Never Used   Substance Use Topics    Alcohol use: No    Drug use: No       Family History   Problem Relation Age of Onset    Cancer Father         liver    Diabetes Father     Stroke Mother     Alzheimer's Disease Mother     Diabetes Brother     Sleep Apnea Brother     Heart Attack Son     Sleep Apnea Brother        No Known Allergies    OBJECTIVE:  /78   Pulse 85   Temp 96.6 °F (35.9 °C) (Tympanic)   Ht 6' 0.01\" (1.829 m)   Wt 253 lb 6.4 oz (114.9 kg)   SpO2 98%   BMI 34.36 kg/m²   GEN:  in NAD obese but weight is down 25 pounds in last 6 months  NECK:  Supple without adenopathy. No bruits  CV:  Regular rate and rhythm, S1 and S2 normal, no murmurs, clicks  PULM:  Chest is clear, no wheezing ,  symmetric air entry throughout both lung fields.   EXT: No rash or edema  NEURO: nonfocal  a1c - 7.5  Lab Results   Component Value Date    CHOL 156 06/15/2017    CHOL 160 2016    CHOL 143 2015     Lab Results   Component Value Date    TRIG 128 06/15/2017    TRIG 136 2016    TRIG 102 2015     Lab Results   Component Value Date    HDL 57 2018    HDL 57 06/15/2017    HDL 54 2016     Lab Results   Component Value Date    LDLCALC 67 2018    LDLCALC 73 06/15/2017    LDLCALC 79 2016     Lab Results   Component Value Date    LABVLDL 17 2018    LABVLDL 26 06/15/2017 LABVLDL 27 05/17/2016     No results found for: [facility]   Lab Results   Component Value Date     11/20/2018    K 4.4 11/20/2018     11/20/2018    CO2 21 11/20/2018    BUN 19 11/20/2018    CREATININE 0.9 11/20/2018    GLUCOSE 153 (H) 11/20/2018    CALCIUM 9.0 11/20/2018    PROT 7.1 11/27/2017    LABALBU 4.0 11/27/2017    BILITOT 0.5 11/27/2017    ALKPHOS 38 (L) 11/27/2017    AST 16 04/30/2018    ALT 15 04/30/2018    LABGLOM >60 11/20/2018    GFRAA >60 11/20/2018    AGRATIO 1.3 11/27/2017    GLOB 3.1 11/27/2017       ASSESSMENT/PLAN:  1. Essential hypertension  Stable, continue present medication    2. Mixed hyperlipidemia stable  Lipid panel today    3. Coronary artery disease involving native coronary artery of native heart without angina pectoris  No chest pain, continue maximal risk reduction  Follow-up with cardiology yearly    4. Type 2 diabetes mellitus without complication, without long-term current use of insulin (HCC)  Stable, anticipate improved diabetic control with ongoing weight loss    5. Obstructive sleep apnea syndrome  CPAP compliant    6.  Non morbid obesity, unspecified obesity type  Improving nicely    7 IMM/HM - needs shingrix #2      8 ed-refill Viagra    9 PTSD - counseling  Flashbacks to [address]      1025 minutes with patient greater than 50% of time discussing healthy lifestyle, counseling options, and coordinating his care

## 2019-05-20 ENCOUNTER — OFFICE VISIT (OUTPATIENT)
Dept: CARDIOLOGY CLINIC | Age: 74
End: 2019-05-20
Payer: MEDICARE

## 2019-05-20 VITALS
HEART RATE: 77 BPM | SYSTOLIC BLOOD PRESSURE: 124 MMHG | OXYGEN SATURATION: 93 % | WEIGHT: 255.8 LBS | HEIGHT: 72 IN | DIASTOLIC BLOOD PRESSURE: 66 MMHG | BODY MASS INDEX: 34.65 KG/M2

## 2019-05-20 DIAGNOSIS — E11.9 TYPE 2 DIABETES MELLITUS WITHOUT COMPLICATION, WITHOUT LONG-TERM CURRENT USE OF INSULIN (HCC): Chronic | ICD-10-CM

## 2019-05-20 DIAGNOSIS — I10 ESSENTIAL HYPERTENSION: Chronic | ICD-10-CM

## 2019-05-20 DIAGNOSIS — G47.33 OBSTRUCTIVE SLEEP APNEA SYNDROME: Chronic | ICD-10-CM

## 2019-05-20 DIAGNOSIS — E78.2 MIXED HYPERLIPIDEMIA: Chronic | ICD-10-CM

## 2019-05-20 DIAGNOSIS — I25.10 CORONARY ARTERY DISEASE INVOLVING NATIVE CORONARY ARTERY OF NATIVE HEART WITHOUT ANGINA PECTORIS: Primary | Chronic | ICD-10-CM

## 2019-05-20 PROCEDURE — 1036F TOBACCO NON-USER: CPT | Performed by: INTERNAL MEDICINE

## 2019-05-20 PROCEDURE — 2022F DILAT RTA XM EVC RTNOPTHY: CPT | Performed by: INTERNAL MEDICINE

## 2019-05-20 PROCEDURE — 99213 OFFICE O/P EST LOW 20 MIN: CPT | Performed by: INTERNAL MEDICINE

## 2019-05-20 PROCEDURE — 1123F ACP DISCUSS/DSCN MKR DOCD: CPT | Performed by: INTERNAL MEDICINE

## 2019-05-20 PROCEDURE — G8427 DOCREV CUR MEDS BY ELIG CLIN: HCPCS | Performed by: INTERNAL MEDICINE

## 2019-05-20 PROCEDURE — 4040F PNEUMOC VAC/ADMIN/RCVD: CPT | Performed by: INTERNAL MEDICINE

## 2019-05-20 PROCEDURE — 3045F PR MOST RECENT HEMOGLOBIN A1C LEVEL 7.0-9.0%: CPT | Performed by: INTERNAL MEDICINE

## 2019-05-20 PROCEDURE — G8417 CALC BMI ABV UP PARAM F/U: HCPCS | Performed by: INTERNAL MEDICINE

## 2019-05-20 PROCEDURE — G8599 NO ASA/ANTIPLAT THER USE RNG: HCPCS | Performed by: INTERNAL MEDICINE

## 2019-05-20 PROCEDURE — 3017F COLORECTAL CA SCREEN DOC REV: CPT | Performed by: INTERNAL MEDICINE

## 2019-05-20 NOTE — LETTER
[address] [place] [address] Cardiology [place] [address] [address] [address]  Phone: [phone]  Fax: [address] [place] [address] Dr, DO        May 29, 2019     Rick Ivan MD  [address] [address]    Patient: Francesca Hoyt  MR Number: [id number]  YOB: 1945  Date of Visit: 2019    Dear Dr. Rick Ivan:                                         19    PATIENT: Francesca Hoyt  : 1945    Primary Care Provider:   Rick Ivan MD  O:[phone]  M:[phone]      Reason for evaluation:   Chief Complaint   Patient presents with    Hyperlipidemia     no cardiac complaints at this time    Hypertension    Coronary Artery Disease    6 Month Follow-Up       History of present illness:   Mr. Francesca Hoyt is a 72 y.o. male patient here today in routine cardiovascular follow up for history of CAD with CABG x 3 LIMA-LAD, SV-OM2, SV-PDA in . Following stable stress testing in 2018 for question of atypical chest symptoms, Nelly Zapata has had no chest concerns. He deals with chronic back pain and says that other than this he has noticed occasional lightheadedness; main example of bending over at golf tees. He rides the cart for 18 holes. Denies palpitations or syncope and is without shortness of breath, PND, orthopnea, or LE edema. Initial history:  He recalls not having symptoms until the day of admission when he \"felt unusually tired after barely starting the day and my wife didn't like the way I looked and had our former paramedic son call the fire station\". He says that he completed cardiac rehab after that hospitalization in  and has not noticed any issues or concerns.       Medical History:      Diagnosis Date    CAD (coronary artery disease)     Cataract     OD    Deviated septum     Erectile dysfunction     Hyperlipidemia     Hypertension     Obesity     Obstructive sleep apnea syndrome 2019 Needs work up   [facility] Retinal detachment     OD    Type II or unspecified type diabetes mellitus without mention of complication, not stated as uncontrolled     Unspecified sleep apnea        Surgical History:      Procedure Laterality Date    CARDIAC SURGERY      CATARACT REMOVAL WITH IMPLANT      OD    CORONARY ARTERY BYPASS GRAFT  2015    Dr. Daniel Talbert - Urgent X3 (LIMA-LAD, SVG-OM2 & PDA)    LAP BAND  2008    RETINAL DETACHMENT SURGERY      OD    SINUS SURGERY Left 2017    FESS       Social History:  Social History     Socioeconomic History    Marital status:      Spouse name: Not on file    Number of children: Not on file    Years of education: Not on file    Highest education level: Not on file   Occupational History    Not on file   Social Needs    Financial resource strain: Not on file    Food insecurity:     Worry: Not on file     Inability: Not on file    Transportation needs:     Medical: Not on file     Non-medical: Not on file   Tobacco Use    Smoking status: Former Smoker     Packs/day: 0.10     Years: 1.00     Pack years: 0.10     Last attempt to quit:      Years since quittin.4    Smokeless tobacco: Never Used   Substance and Sexual Activity    Alcohol use: Yes     Comment: rarely    Drug use: No    Sexual activity: Not on file   Lifestyle    Physical activity:     Days per week: Not on file     Minutes per session: Not on file    Stress: Not on file   Relationships    Social connections:     Talks on phone: Not on file     Gets together: Not on file     Attends Sabianism service: Not on file     Active member of club or organization: Not on file     Attends meetings of clubs or organizations: Not on file     Relationship status: Not on file    Intimate partner violence:     Fear of current or ex partner: Not on file     Emotionally abused: Not on file     Physically abused: Not on file     Forced sexual activity: Not on file Other Topics Concern    Not on file   Social History Narrative    Not on file        Family History:  No evidence for sudden cardiac death or premature CAD. Problem Relation Age of Onset    Cancer Father         liver    Diabetes Father     Stroke Mother     Alzheimer's Disease Mother     Diabetes Brother     Sleep Apnea Brother     Heart Attack Son     Sleep Apnea Brother        Medications:  [x] Medications and dosages reviewed. Prior to Admission medications    Medication Sig Start Date End Date Taking? Authorizing Provider   sildenafil (VIAGRA) 100 MG tablet Take 1 tablet by mouth as needed for Erectile Dysfunction 5/15/19  Yes Gregoria Frost MD   telmisartan (MICARDIS) 20 MG tablet Take 1 tablet by mouth daily 4/16/19  Yes Gregoria Frost MD   dapagliflozin (FARXIGA) 10 MG tablet Take 1 tablet by mouth every morning 4/11/19  Yes Gregoria Frost MD   metFORMIN (GLUCOPHAGE) 1000 MG tablet TAKE ONE TABLET BY MOUTH TWICE A DAY WITH MEALS 4/3/19  Yes Gregoria Frost MD   blood glucose test strips (ASCENSIA AUTODISC VI;ONE TOUCH ULTRA TEST VI) strip 1 each by In Vitro route daily As needed. 11/16/18  Yes Gregoria Frost MD   metoprolol tartrate (LOPRESSOR) 25 MG tablet Take 0.5 tablets by mouth 2 times daily 10/22/18  Yes Lonie Cousin, DO   atorvastatin (LIPITOR) 40 MG tablet Take 1 tablet by mouth daily 10/22/18  Yes Lonie Cousin, DO   aspirin 325 MG EC tablet Take 1 tablet by mouth daily 12/14/15  Yes Gregoria Frost MD   glucose blood VI test strips (EXACTECH TEST) strip 1 each by In Vitro route 3 times daily As needed. 8/17/15  Yes Gregoria Frost MD   Lancets MISC One Touch Verio IQ Pt test TID 8/17/15  Yes Gregoria Frost MD   glucose monitoring kit (FREESTYLE) monitoring kit 1 kit by Does not apply route daily as needed 8/17/15   Gregoria Frost MD       Allergies:  Patient has no known allergies.      Review of Systems: [x]Full ROS obtained and negative except as mentioned in HPI    Physical Examination:    /66 (Site: Right Upper Arm, Position: Sitting, Cuff Size: Large Adult)   Pulse 77   Ht 6' (1.829 m)   Wt 255 lb 12.8 oz (116 kg)   SpO2 93%   BMI 34.69 kg/m²    Wt Readings from Last 3 Encounters:   05/20/19 255 lb 12.8 oz (116 kg)   05/15/19 253 lb 6.4 oz (114.9 kg)   05/14/19 258 lb (117 kg)       GENERAL: Well developed, well nourished, no acute distress  NEUROLOGICAL: Alert and oriented x3  PSYCH: Normal mood and affect   SKIN: Warm and dry, without lesions  HEENT: Normocephalic, atraumatic, Sclera non-icteric, mucous membranes moist  NECK: supple, JVP normal, thyroid not enlarged   CAROTID: Normal upstroke, no bruits  CARDIAC: Normal PMI, regular rate and rhythm, normal S1S2, no murmur, rub  RESPIRATORY: Normal respiratory effort, clear to auscultation bilaterally  EXTREMITIES: No cyanosis, clubbing or edema, palpable pulses bilaterally   MUSCULOSKELETAL: No joint swelling or tenderness, no chest wall tenderness  GASTROINTESTINAL:  soft, non-tender, no bruit    Labs:  Lab Review   Orders Only on 05/15/2019   Component Date Value    Cholesterol, Total 05/15/2019 162     Triglycerides 05/15/2019 200*    HDL 05/15/2019 41     LDL Calculated 05/15/2019 81     VLDL Cholesterol Calcula* 05/15/2019 40    Office Visit on 05/15/2019   Component Date Value    Hemoglobin A1C 05/15/2019 7.5      Imaging:  I have reviewed the below testing personally:  Echo 11/16/18   Summary   -DEFINITY was used to better visualize left ventricle.   -Normal left ventricle size and wall thickness. Ejection fraction is   visually estimated to be 40-45%. Global hypokinesis more marked at the apex.   -Mild mitral regurgitation.   -Mild tricuspid regurgitation with RVSP of 30 mmHg. Stress Test 10/29/18   Summary    Normal LV size and systolic function.    Left ventricular ejection fraction of 59 %. Sammy Fret is a large sized, moderate intensity, completely fixed defect    inferolaterally, inferoapically. There appears to be mild hypokinesis in    these segments but is also with diaphragmatic attenuation artifact.    No evidence of myocardium at risk for significant reversible ischemia. CATH:   8/9/15 Multivessel disease  8/14/15 CABG x 3 LIMA-LAD, SV-OM2, SV-PDA; [facility]     VASCULAR:   Carotids 11/16/18   -The right internal carotid artery appears to have a <50% diameter reducing    stenosis based on velocity criteria. Minimal plaque.    -The left internal carotid artery demonstrates no significant stenosis. US for AAA  No evidence of abdominal aortic aneurysm. Impression/Recommendations    Mr. Tila Post is a 72 y.o. male patient with:    1. Stable Coronary artery disease (8/14/15 CABG x 3 LIMA-LAD, SV-OM2, SV-PDA) (October 2018 SPECT MPI without ischemia) ; ICMP (LVEF 40-45% without clinical heart failure)   2. Mixed hyperlipidemia, stable: 5/5/19   LDL 81 HDL 41.   3. Nonobstructive carotid artery disease  4. Diabetes mellitus, non insulin dependent (A1C 11/2018 7.5)  5. Hypertension, controlled    6. Obesity   7. Chronic back pain  8. FRANCISCO    The following cardiovascular specific medications were confirmed at this visit:     Telmisartan 20 mg daily (Off lisinopril 2/2 hyperkalemia)  Metoprolol Tartrate 25 mg BID  Atorvastatin 40 mg daily   mg    BB and ARB remain as first choice antihypertensives for CMP purposes as well. Sushila Greene leads a Functional Class I lifestyle but is looking to dieting (he mentions [facility]) and low impact exercise as his weight is at a  plateau - discussed diabetes control/central obesity/ hypertriglyceridemia. Return in about 1 year (around 5/20/2020). Patient Instructions   No medication changes  Follow up in 1 year    Thank you for allowing me to participate in the care of your patient. Please do not hesitate to call. Leslie Rodrigues D.O., [facility] [place]  Interventional Cardiology     o: [phone]  [address] [place] [address]., [address], [address]    NOTE:  This report was transcribed using voice recognition software. Every effort was made to ensure accuracy; however, inadvertent computerized transcription errors may be present. Scribe's Attestation: This note was scribed in the presence of Dr. Kenzie Virgen DO by EDI Leal. The above documentation has been prepared under my direction and personally reviewed by me in its entirety. I confirm that the note above accurately reflects all work, treatment, procedures, and medical decision making performed by me. An electronic signature was used to authenticate this note.            Sincerely,        Tanya Keith DO

## 2019-06-12 ENCOUNTER — OFFICE VISIT (OUTPATIENT)
Dept: PSYCHOLOGY | Age: 74
End: 2019-06-12
Payer: MEDICARE

## 2019-06-12 DIAGNOSIS — F43.9 TRAUMA AND STRESSOR-RELATED DISORDER: Primary | ICD-10-CM

## 2019-06-12 PROCEDURE — 90791 PSYCH DIAGNOSTIC EVALUATION: CPT | Performed by: PSYCHOLOGIST

## 2019-06-12 RX ORDER — TELMISARTAN 20 MG/1
20 TABLET ORAL DAILY
Qty: 30 TABLET | Refills: 11 | Status: SHIPPED | OUTPATIENT
Start: 2019-06-12 | End: 2020-05-01 | Stop reason: SDUPTHER

## 2019-06-12 NOTE — PROGRESS NOTES
Behavioral Health Consultation  Sapna Madden, Ph.D.  Psychologist  6/12/2019  2:30 PM      Time spent with Patient: 30 minutes  [facility]. Reason for Consult:    Chief Complaint   Patient presents with    Anxiety     Referring Provider: Kim Miner MD  [address] [place] [address] RD. [address], [address]    Pt provided informed consent for the behavioral health program. Discussed with patient model of service to include the limits of confidentiality (i.e. abuse reporting, suicide  intervention, etc.) and short-term intervention focused approach. Pt indicated understanding. Feedback given to PCP. S:  Pt seen per PCP re: anxiety    Pt seen for intake. Pt reported sxs consistent w/ Trauma and Stressor related disorder. Pt reported that he has been experiencing physiological arousal, rumination, and anxious thoughts. Pt stated that his sxs began several months ago and was unable to identify any onset factors. 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 Pt denied PTSD related sxs from his training. Pt reported that he did not end up having to go to [place] but has experienced significant distress related to the idea that he was ready to go to war and kill individuals if needed. Pt noted that he frequently ruminates on this thought and that it has negatively impacted his adaptive functioning. Focused intervention on psychoed re: trauma response, psychoed on CBT, and discussed cognitive distortions.      O:  MSE:    Appearance    alert, cooperative  Impulsive behavior No  Speech    normal rate, normal volume and well articulated  Mood    Euthymic  Affect    normal affect  Thought Content    cognitive distortions  Thought Process    linear and coherent  Associations    logical connections  Insight    Good  Judgment    Intact  Orientation Insight and motivation are good. Diagnosis:    Trauma and Stressor Disorder     Plan:  Pt interventions:  Discussed various factors related to the development and maintenance of  anxiety (including biological, cognitive, behavioral, and environmental factors), Provided education, Discussed self-care (sleep, nutrition, rewarding activities, social support, exercise), Established rapport, [place]-setting to identify pt's primary goals for [facility] visit / overall health and Supportive techniques    Documentation was done using voice recognition dragon software. Every effort was made to ensure accuracy; however, inadvertent, unintentional computerized transcription errors may be present.

## 2019-06-12 NOTE — TELEPHONE ENCOUNTER
Medication and Quantity requested: telmisartan (MICARDIS) 20 MG tablet     Last Visit  5/15/2019    Pharmacy and phone number updated in EPIC:  yes

## 2019-06-26 ENCOUNTER — CARE COORDINATION (OUTPATIENT)
Dept: CARE COORDINATION | Age: 74
End: 2019-06-26

## 2019-06-26 ENCOUNTER — OFFICE VISIT (OUTPATIENT)
Dept: PSYCHOLOGY | Age: 74
End: 2019-06-26
Payer: MEDICARE

## 2019-06-26 DIAGNOSIS — F43.9 TRAUMA AND STRESSOR-RELATED DISORDER: Primary | ICD-10-CM

## 2019-06-26 PROCEDURE — 90832 PSYTX W PT 30 MINUTES: CPT | Performed by: PSYCHOLOGIST

## 2019-06-26 NOTE — PROGRESS NOTES
Behavioral Health Consultation  Kyaw Castellon, Ph.D.  Psychologist  6/26/2019       8:00 AM    Time spent with Patient: 30 minutes  This is patient's 2nd [facility] appointment. Reason for Consult:        Chief Complaint   Patient presents with    Anxiety      Referring Provider: Basia Aldana MD  [address] [place] [address]     Pt provided informed consent for the behavioral health program. Discussed with patient model of service to include the limits of confidentiality (i.e. abuse reporting, suicide  intervention, etc.) and short-term intervention focused approach. Pt indicated understanding. Feedback given to PCP. S:  Pt seen per PCP re: anxiety    Pt seen for f/u. 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 Discussed his start in the [employer], systematic training, and his emotional experience during this period. Introduced thought replacement as a strategy for improving intrusive thoughts. Used cognitive restructuring and perspective taking to identify alternative thoughts. Set goal for Pt to say outloud his alternative thought when the intrusive thought occurs and then to engage in distraction until the related emotion ceases.      O:  MSE:    Appearance    alert, cooperative  Impulsive behavior No  Speech    normal rate, normal volume and well articulated  Mood    Euthymic  Affect    normal affect  Thought Content    cognitive distortions  Thought Process    linear and coherent  Associations    logical connections  Insight    Good  Judgment    Intact  Orientation    oriented to person, place, time, and general circumstances  Memory    recent and remote memory intact  Attention/Concentration    intact  Morbid ideation No  Suicide Assessment    no suicidal ideation    History:    Social History:   Social History     Socioeconomic History    Marital status:      Spouse name: Not on file  Number of children: Not on file    Years of education: Not on file    Highest education level: Not on file   Occupational History    Not on file   Social Needs    Financial resource strain: Not on file    Food insecurity:     Worry: Not on file     Inability: Not on file    Transportation needs:     Medical: Not on file     Non-medical: Not on file   Tobacco Use    Smoking status: Former Smoker     Packs/day: 0.10     Years: 1.00     Pack years: 0.10     Last attempt to quit:      Years since quittin.5    Smokeless tobacco: Never Used   Substance and Sexual Activity    Alcohol use: Yes     Comment: rarely    Drug use: No    Sexual activity: Not on file   Lifestyle    Physical activity:     Days per week: Not on file     Minutes per session: Not on file    Stress: Not on file   Relationships    Social connections:     Talks on phone: Not on file     Gets together: Not on file     Attends Yarsanism service: Not on file     Active member of club or organization: Not on file     Attends meetings of clubs or organizations: Not on file     Relationship status: Not on file    Intimate partner violence:     Fear of current or ex partner: Not on file     Emotionally abused: Not on file     Physically abused: Not on file     Forced sexual activity: Not on file   Other Topics Concern    Not on file   Social History Narrative    Not on file     TOBACCO:   reports that he quit smoking about 41 years ago. He has a 0.10 pack-year smoking history. He has never used smokeless tobacco.  ETOH:   reports that he drinks alcohol. A:  Patient engaged and cooperative. Denies SI. Insight and motivation are good.      Diagnosis:    Trauma and Stressor Disorder     Plan:  Pt interventions:  Discussed various factors related to the development and maintenance of  anxiety (including biological, cognitive, behavioral, and environmental factors), Provided education, Discussed self-care (sleep, nutrition, rewarding

## 2019-06-26 NOTE — CARE COORDINATION
Pt is not active with [facility]. Pt mailed pt Free DM Education Class Flyers for July at the following locations: [address] [place] [address] for July dates/times. Pt can call if interested to review and attend if interested. Last A1c:    Results for Chantell Dill (MRN [id number]) as of 6/26/2019 11:02   Ref.  Range 5/15/2019 09:21   Hemoglobin A1C Latest Units: % 7.5       Schuyler KEVINN, RN Care Manager  [address] [place] [address] &  [facility]   [phone]

## 2019-06-26 NOTE — PATIENT INSTRUCTIONS
1) Work on thought replacement. When you have the thought of How could I have been willing to do that-replace it with It was something that I was systematically trained to do and I was obliged to complete the mission    When you have the thought of Why am I here- replace it with God had a bigger plan for me and needed me to fulfill it. Then distract yourself from the thought. You have to try to do something that requires mental effort so that you are unable to get back into that loop.

## 2019-07-02 ENCOUNTER — OFFICE VISIT (OUTPATIENT)
Dept: ENT CLINIC | Age: 74
End: 2019-07-02
Payer: MEDICARE

## 2019-07-02 VITALS — HEART RATE: 64 BPM | OXYGEN SATURATION: 96 % | SYSTOLIC BLOOD PRESSURE: 128 MMHG | DIASTOLIC BLOOD PRESSURE: 76 MMHG

## 2019-07-02 DIAGNOSIS — J01.00 SUBACUTE MAXILLARY SINUSITIS: Primary | ICD-10-CM

## 2019-07-02 PROCEDURE — 1036F TOBACCO NON-USER: CPT | Performed by: OTOLARYNGOLOGY

## 2019-07-02 PROCEDURE — 99213 OFFICE O/P EST LOW 20 MIN: CPT | Performed by: OTOLARYNGOLOGY

## 2019-07-02 PROCEDURE — G8417 CALC BMI ABV UP PARAM F/U: HCPCS | Performed by: OTOLARYNGOLOGY

## 2019-07-02 PROCEDURE — 4040F PNEUMOC VAC/ADMIN/RCVD: CPT | Performed by: OTOLARYNGOLOGY

## 2019-07-02 PROCEDURE — 3017F COLORECTAL CA SCREEN DOC REV: CPT | Performed by: OTOLARYNGOLOGY

## 2019-07-02 PROCEDURE — 1123F ACP DISCUSS/DSCN MKR DOCD: CPT | Performed by: OTOLARYNGOLOGY

## 2019-07-02 PROCEDURE — G8427 DOCREV CUR MEDS BY ELIG CLIN: HCPCS | Performed by: OTOLARYNGOLOGY

## 2019-07-02 PROCEDURE — G8599 NO ASA/ANTIPLAT THER USE RNG: HCPCS | Performed by: OTOLARYNGOLOGY

## 2019-07-02 RX ORDER — FLUTICASONE PROPIONATE 50 MCG
1 SPRAY, SUSPENSION (ML) NASAL DAILY
Qty: 1 BOTTLE | Refills: 0 | Status: SHIPPED | OUTPATIENT
Start: 2019-07-02 | End: 2019-11-22

## 2019-07-02 RX ORDER — DOXYCYCLINE HYCLATE 100 MG
100 TABLET ORAL 2 TIMES DAILY
Qty: 14 TABLET | Refills: 0 | Status: SHIPPED | OUTPATIENT
Start: 2019-07-02 | End: 2019-11-22

## 2019-07-09 ENCOUNTER — TELEPHONE (OUTPATIENT)
Dept: FAMILY MEDICINE CLINIC | Age: 74
End: 2019-07-09

## 2019-07-09 DIAGNOSIS — Z00.00 ENCOUNTER FOR MEDICARE ANNUAL WELLNESS EXAM: Primary | ICD-10-CM

## 2019-07-10 ENCOUNTER — OFFICE VISIT (OUTPATIENT)
Dept: PSYCHOLOGY | Age: 74
End: 2019-07-10
Payer: MEDICARE

## 2019-07-10 DIAGNOSIS — F43.9 TRAUMA AND STRESSOR-RELATED DISORDER: Primary | ICD-10-CM

## 2019-07-10 PROCEDURE — 90832 PSYTX W PT 30 MINUTES: CPT | Performed by: PSYCHOLOGIST

## 2019-07-31 ENCOUNTER — OFFICE VISIT (OUTPATIENT)
Dept: PSYCHOLOGY | Age: 74
End: 2019-07-31
Payer: MEDICARE

## 2019-07-31 DIAGNOSIS — F43.9 TRAUMA AND STRESSOR-RELATED DISORDER: Primary | ICD-10-CM

## 2019-07-31 PROCEDURE — 90832 PSYTX W PT 30 MINUTES: CPT | Performed by: PSYCHOLOGIST

## 2019-07-31 NOTE — PROGRESS NOTES
Behavioral Health Consultation  Genesis Bronson, Ph.D.  Psychologist  7/31/2019       11:30 AM    Time spent with Patient: 30 minutes  This is patient's 4th [facility] appointment. Reason for Consult:        Chief Complaint   Patient presents with    Anxiety      Referring Provider: Alecia Katz MD  [address] [place] [address]     Pt provided informed consent for the behavioral health program. Discussed with patient model of service to include the limits of confidentiality (i.e. abuse reporting, suicide  intervention, etc.) and short-term intervention focused approach. Pt indicated understanding. Feedback given to PCP. S:  Pt seen per PCP re: anxiety    Pt seen for f/u. 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 Pt reported that with the decrease in the ruminative thoughts he has been experiencing greater flashbacks related to the events he was subjected to in the special service training. Provided psychoed on trauma response as well as narrative exposure therapy. Pt noted that he would like to start and try narrative exposure therapy. Will start next week.            O:  MSE:    Appearance    alert, cooperative  Impulsive behavior No  Speech    normal rate, normal volume and well articulated  Mood    Euthymic  Affect    normal affect  Thought Content    cognitive distortions  Thought Process    linear and coherent  Associations    logical connections  Insight    Good  Judgment    Intact  Orientation    oriented to person, place, time, and general circumstances  Memory    recent and remote memory intact  Attention/Concentration    intact  Morbid ideation No  Suicide Assessment    no suicidal ideation    History:    Social History:   Social History     Socioeconomic History    Marital status:      Spouse name: Not on file    Number of children: Not on file    Years of education: Not on file    Highest education level: Not on file   Occupational History    Not on file   Social Needs    Financial resource strain: Not on file    Food insecurity:     Worry: Not on file     Inability: Not on file    Transportation needs:     Medical: Not on file     Non-medical: Not on file   Tobacco Use    Smoking status: Former Smoker     Packs/day: 0.10     Years: 1.00     Pack years: 0.10     Last attempt to quit:      Years since quittin.6    Smokeless tobacco: Never Used   Substance and Sexual Activity    Alcohol use: Yes     Comment: rarely    Drug use: No    Sexual activity: Not on file   Lifestyle    Physical activity:     Days per week: Not on file     Minutes per session: Not on file    Stress: Not on file   Relationships    Social connections:     Talks on phone: Not on file     Gets together: Not on file     Attends Buddhism service: Not on file     Active member of club or organization: Not on file     Attends meetings of clubs or organizations: Not on file     Relationship status: Not on file    Intimate partner violence:     Fear of current or ex partner: Not on file     Emotionally abused: Not on file     Physically abused: Not on file     Forced sexual activity: Not on file   Other Topics Concern    Not on file   Social History Narrative    Not on file     TOBACCO:   reports that he quit smoking about 41 years ago. He has a 0.10 pack-year smoking history. He has never used smokeless tobacco.  ETOH:   reports that he drinks alcohol. A:  Patient engaged and cooperative. Denies SI. Insight and motivation are good.      Diagnosis:    Trauma and Stressor Disorder     Plan:  Pt interventions:  Discussed various factors related to the development and maintenance of  anxiety (including biological, cognitive, behavioral, and environmental factors), Provided education, Discussed self-care (sleep, nutrition, rewarding activities, social support, exercise),

## 2019-08-09 ENCOUNTER — OFFICE VISIT (OUTPATIENT)
Dept: PSYCHOLOGY | Age: 74
End: 2019-08-09
Payer: MEDICARE

## 2019-08-09 DIAGNOSIS — F43.9 TRAUMA AND STRESSOR-RELATED DISORDER: Primary | ICD-10-CM

## 2019-08-09 PROCEDURE — 90832 PSYTX W PT 30 MINUTES: CPT | Performed by: PSYCHOLOGIST

## 2019-08-09 NOTE — PROGRESS NOTES
Behavioral Health Consultation  Steve King, Ph.D.  Psychologist  8/9/2019       11:30 AM    Time spent with Patient: 30 minutes  This is patient's 5th [facility] appointment. Reason for Consult:        Chief Complaint   Patient presents with    Anxiety      Referring Provider: Charlie Kim MD  [address] [place] [address]. [place], [place]     Pt provided informed consent for the behavioral health program. Discussed with patient model of service to include the limits of confidentiality (i.e. abuse reporting, suicide  intervention, etc.) and short-term intervention focused approach. Pt indicated understanding. Feedback given to PCP. S:  Pt seen per PCP re: anxiety    Pt seen for f/u. Pt brought in list of traumatic experiences that are coming up as intrusive thoughts for him. Introduced narrative exposure therapy and provided related psychoeducation. 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 Pt stated that for three days he was kept in a dog cage throughout the night, training service members would hit his cage, he did not have blankets and would shiver throughout the night, he was denied water and food and given a container to urinate in, and he was not allowed to speak. Pt was let out of dog cage to engage in other aspects of training. Discussed related emotional and cognitive experience. Set goal for Pt to continue exposure at home through writing his experience and reading it outloud daily. Made plan to engage in relaxation afterwards .       O:  MSE:    Appearance    alert, cooperative  Impulsive behavior No  Speech    normal rate, normal volume and well articulated  Mood    Euthymic  Affect    normal affect  Thought Content    cognitive distortions  Thought Process    linear and coherent  Associations    logical connections  Insight    Good  Judgment    Intact  Orientation    oriented to person, place, time, and general circumstances  Memory    recent and remote

## 2019-08-11 DIAGNOSIS — E11.9 TYPE 2 DIABETES MELLITUS WITHOUT COMPLICATION, WITHOUT LONG-TERM CURRENT USE OF INSULIN (HCC): Chronic | ICD-10-CM

## 2019-08-12 RX ORDER — DAPAGLIFLOZIN 10 MG/1
TABLET, FILM COATED ORAL
Qty: 90 TABLET | Refills: 2 | Status: SHIPPED | OUTPATIENT
Start: 2019-08-12 | End: 2019-11-22 | Stop reason: ALTCHOICE

## 2019-08-15 ENCOUNTER — OFFICE VISIT (OUTPATIENT)
Dept: PSYCHOLOGY | Age: 74
End: 2019-08-15
Payer: MEDICARE

## 2019-08-15 DIAGNOSIS — F43.9 TRAUMA AND STRESSOR-RELATED DISORDER: Primary | ICD-10-CM

## 2019-08-15 PROCEDURE — 90832 PSYTX W PT 30 MINUTES: CPT | Performed by: PSYCHOLOGIST

## 2019-08-15 NOTE — PROGRESS NOTES
Behavioral Health Consultation  Kay Minor, Ph.D.  Psychologist  2019                 9:00  AM    Time spent with Patient: 30 minutes  This is patient's 6th [facility] appointment. Reason for Consult:        Chief Complaint   Patient presents with    Anxiety      Referring Provider: Edd Monson MD  [address] [place] [address]. [place], [place]     Pt provided informed consent for the behavioral health program. Discussed with patient model of service to include the limits of confidentiality (i.e. abuse reporting, suicide  intervention, etc.) and short-term intervention focused approach. Pt indicated understanding. Feedback given to PCP. S:  Pt seen per PCP re: anxiety      Pt seen for f/u. Pt reported that he followed through with the narrative exposure exercise during the week which he found helpful in processing the event. Focused intervention on next experience. Pt described being forced into a small box for several hours, seeing someone waterboarded, and seeing and hearing someone during a forced stretching exercise with chains. Explored these experiences as well as Pts thoughts and emotions during the events. Discussed triggers of traumatic memories. Pt noted he was unsure of triggers but will pay attention to these in the coming weeks so we can discuss related coping. Set goal for Pt to engage in narrative exposure at home.        O:  MSE:    Appearance    alert, cooperative  Impulsive behavior No  Speech    normal rate, normal volume and well articulated  Mood    Euthymic  Affect    normal affect  Thought Content    cognitive distortions  Thought Process    linear and coherent  Associations    logical connections  Insight    Good  Judgment    Intact  Orientation    oriented to person, place, time, and general circumstances  Memory    recent and remote memory intact  Attention/Concentration    intact  Morbid ideation No  Suicide Assessment    no suicidal ideation    History:    Social History:   Social

## 2019-08-22 ENCOUNTER — OFFICE VISIT (OUTPATIENT)
Dept: FAMILY MEDICINE CLINIC | Age: 74
End: 2019-08-22
Payer: MEDICARE

## 2019-08-22 VITALS
HEART RATE: 73 BPM | BODY MASS INDEX: 34.81 KG/M2 | DIASTOLIC BLOOD PRESSURE: 80 MMHG | OXYGEN SATURATION: 96 % | SYSTOLIC BLOOD PRESSURE: 130 MMHG | TEMPERATURE: 97.1 F | HEIGHT: 72 IN | WEIGHT: 257 LBS

## 2019-08-22 DIAGNOSIS — E78.2 MIXED HYPERLIPIDEMIA: Chronic | ICD-10-CM

## 2019-08-22 DIAGNOSIS — Z00.00 ANNUAL PHYSICAL EXAM: Primary | ICD-10-CM

## 2019-08-22 DIAGNOSIS — G47.33 OBSTRUCTIVE SLEEP APNEA SYNDROME: Chronic | ICD-10-CM

## 2019-08-22 DIAGNOSIS — E66.9 NON MORBID OBESITY, UNSPECIFIED OBESITY TYPE: Chronic | ICD-10-CM

## 2019-08-22 DIAGNOSIS — Z00.00 ROUTINE GENERAL MEDICAL EXAMINATION AT A HEALTH CARE FACILITY: ICD-10-CM

## 2019-08-22 DIAGNOSIS — I25.10 CORONARY ARTERY DISEASE INVOLVING NATIVE CORONARY ARTERY OF NATIVE HEART WITHOUT ANGINA PECTORIS: Chronic | ICD-10-CM

## 2019-08-22 DIAGNOSIS — I10 ESSENTIAL HYPERTENSION: Chronic | ICD-10-CM

## 2019-08-22 LAB
BILIRUBIN, POC: NORMAL
BLOOD URINE, POC: NORMAL
CLARITY, POC: CLEAR
COLOR, POC: YELLOW
GLUCOSE URINE, POC: 500
KETONES, POC: NORMAL
LEUKOCYTE EST, POC: NORMAL
NITRITE, POC: NORMAL
PH, POC: 5.5
PROTEIN, POC: NORMAL
SPECIFIC GRAVITY, POC: 1.01
UROBILINOGEN, POC: 0.2

## 2019-08-22 PROCEDURE — G0439 PPPS, SUBSEQ VISIT: HCPCS | Performed by: FAMILY MEDICINE

## 2019-08-22 PROCEDURE — 81002 URINALYSIS NONAUTO W/O SCOPE: CPT | Performed by: FAMILY MEDICINE

## 2019-08-22 PROCEDURE — 4040F PNEUMOC VAC/ADMIN/RCVD: CPT | Performed by: FAMILY MEDICINE

## 2019-08-22 PROCEDURE — 1123F ACP DISCUSS/DSCN MKR DOCD: CPT | Performed by: FAMILY MEDICINE

## 2019-08-22 PROCEDURE — 3017F COLORECTAL CA SCREEN DOC REV: CPT | Performed by: FAMILY MEDICINE

## 2019-08-22 PROCEDURE — G8599 NO ASA/ANTIPLAT THER USE RNG: HCPCS | Performed by: FAMILY MEDICINE

## 2019-08-22 RX ORDER — TAMSULOSIN HYDROCHLORIDE 0.4 MG/1
0.4 CAPSULE ORAL DAILY
Qty: 30 CAPSULE | Refills: 3 | Status: SHIPPED | OUTPATIENT
Start: 2019-08-22 | End: 2019-12-18

## 2019-08-22 ASSESSMENT — LIFESTYLE VARIABLES
HOW OFTEN DURING THE LAST YEAR HAVE YOU FOUND THAT YOU WERE NOT ABLE TO STOP DRINKING ONCE YOU HAD STARTED: 0
HOW OFTEN DURING THE LAST YEAR HAVE YOU BEEN UNABLE TO REMEMBER WHAT HAPPENED THE NIGHT BEFORE BECAUSE YOU HAD BEEN DRINKING: 0
AUDIT TOTAL SCORE: 1
HAS A RELATIVE, FRIEND, DOCTOR, OR ANOTHER HEALTH PROFESSIONAL EXPRESSED CONCERN ABOUT YOUR DRINKING OR SUGGESTED YOU CUT DOWN: 0
HOW OFTEN DO YOU HAVE SIX OR MORE DRINKS ON ONE OCCASION: 0
HOW OFTEN DURING THE LAST YEAR HAVE YOU FAILED TO DO WHAT WAS NORMALLY EXPECTED FROM YOU BECAUSE OF DRINKING: 0
HOW OFTEN DURING THE LAST YEAR HAVE YOU HAD A FEELING OF GUILT OR REMORSE AFTER DRINKING: 0
HOW OFTEN DO YOU HAVE A DRINK CONTAINING ALCOHOL: 1
HOW OFTEN DURING THE LAST YEAR HAVE YOU NEEDED AN ALCOHOLIC DRINK FIRST THING IN THE MORNING TO GET YOURSELF GOING AFTER A NIGHT OF HEAVY DRINKING: 0
HOW MANY STANDARD DRINKS CONTAINING ALCOHOL DO YOU HAVE ON A TYPICAL DAY: 0
AUDIT-C TOTAL SCORE: 1
HAVE YOU OR SOMEONE ELSE BEEN INJURED AS A RESULT OF YOUR DRINKING: 0

## 2019-08-22 ASSESSMENT — PATIENT HEALTH QUESTIONNAIRE - PHQ9
SUM OF ALL RESPONSES TO PHQ QUESTIONS 1-9: 0
SUM OF ALL RESPONSES TO PHQ QUESTIONS 1-9: 0

## 2019-08-23 ENCOUNTER — OFFICE VISIT (OUTPATIENT)
Dept: PSYCHOLOGY | Age: 74
End: 2019-08-23
Payer: MEDICARE

## 2019-08-23 DIAGNOSIS — F43.9 TRAUMA AND STRESSOR-RELATED DISORDER: Primary | ICD-10-CM

## 2019-08-23 PROCEDURE — 90832 PSYTX W PT 30 MINUTES: CPT | Performed by: PSYCHOLOGIST

## 2019-08-23 NOTE — PROGRESS NOTES
exercise), Established rapport, [place]-setting to identify pt's primary goals for [facility] visit / overall health and Supportive techniques    Documentation was done using voice recognition dragon software. Every effort was made to ensure accuracy; however, inadvertent, unintentional computerized transcription errors may be present.

## 2019-08-27 ENCOUNTER — TELEPHONE (OUTPATIENT)
Dept: FAMILY MEDICINE CLINIC | Age: 74
End: 2019-08-27

## 2019-09-16 ENCOUNTER — PATIENT MESSAGE (OUTPATIENT)
Dept: FAMILY MEDICINE CLINIC | Age: 74
End: 2019-09-16

## 2019-09-16 DIAGNOSIS — M19.049 HAND ARTHRITIS: Primary | ICD-10-CM

## 2019-09-16 NOTE — TELEPHONE ENCOUNTER
From: Momo Kohli  To: Ursula Morin MD  Sent: 9/16/2019 12:12 PM EDT  Subject: Non-Urgent Medical Question    Dr. Brittanie Ansari,  Concerning the arthritis below my thumb near the wrist that we discussed, it now bothers me all day, even getting up in the morning and getting worse. You mentioned over the counter meds when it starts up but I think possibly a shot might be in order. Can that be performed in the office or should I go to a \"hand specialist\"? Other recommendations? I'm not due back in for a regular appointment until mid November. Thank you in advance for your response.   EJ \"Skip\" Angel Kirkpatrickor  1945

## 2019-09-19 ENCOUNTER — OFFICE VISIT (OUTPATIENT)
Dept: ORTHOPEDIC SURGERY | Age: 74
End: 2019-09-19
Payer: MEDICARE

## 2019-09-19 VITALS — HEIGHT: 72 IN | WEIGHT: 257.06 LBS | BODY MASS INDEX: 34.82 KG/M2

## 2019-09-19 DIAGNOSIS — M18.11 PRIMARY OSTEOARTHRITIS OF FIRST CARPOMETACARPAL JOINT OF RIGHT HAND: ICD-10-CM

## 2019-09-19 DIAGNOSIS — M79.641 RIGHT HAND PAIN: Primary | ICD-10-CM

## 2019-09-19 PROCEDURE — L3924 HFO WITHOUT JOINTS PRE OTS: HCPCS | Performed by: ORTHOPAEDIC SURGERY

## 2019-09-19 PROCEDURE — G8599 NO ASA/ANTIPLAT THER USE RNG: HCPCS | Performed by: ORTHOPAEDIC SURGERY

## 2019-09-19 PROCEDURE — 3017F COLORECTAL CA SCREEN DOC REV: CPT | Performed by: ORTHOPAEDIC SURGERY

## 2019-09-19 PROCEDURE — G8428 CUR MEDS NOT DOCUMENT: HCPCS | Performed by: ORTHOPAEDIC SURGERY

## 2019-09-19 PROCEDURE — 20600 DRAIN/INJ JOINT/BURSA W/O US: CPT | Performed by: ORTHOPAEDIC SURGERY

## 2019-09-19 PROCEDURE — 4040F PNEUMOC VAC/ADMIN/RCVD: CPT | Performed by: ORTHOPAEDIC SURGERY

## 2019-09-19 PROCEDURE — 99203 OFFICE O/P NEW LOW 30 MIN: CPT | Performed by: ORTHOPAEDIC SURGERY

## 2019-09-19 PROCEDURE — G8417 CALC BMI ABV UP PARAM F/U: HCPCS | Performed by: ORTHOPAEDIC SURGERY

## 2019-09-19 PROCEDURE — 1123F ACP DISCUSS/DSCN MKR DOCD: CPT | Performed by: ORTHOPAEDIC SURGERY

## 2019-09-19 PROCEDURE — 1036F TOBACCO NON-USER: CPT | Performed by: ORTHOPAEDIC SURGERY

## 2019-09-19 NOTE — PROGRESS NOTES
1 tablet by mouth daily 90 tablet 3    aspirin 325 MG EC tablet Take 1 tablet by mouth daily 30 tablet 3    glucose monitoring kit (FREESTYLE) monitoring kit 1 kit by Does not apply route daily as needed 1 kit 0    glucose blood VI test strips (EXACTECH TEST) strip 1 each by In Vitro route 3 times daily As needed. 100 each 3    Lancets MISC One Touch Verio IQ Pt test  each 3     No current facility-administered medications on file prior to visit.       Past Medical History:   Diagnosis Date    CAD (coronary artery disease)     Cataract     OD    Deviated septum     Erectile dysfunction     Hyperlipidemia     Hypertension     Obesity     Obstructive sleep apnea syndrome 2019    Needs work up   [address] [place] [address] Retinal detachment     OD    Type II or unspecified type diabetes mellitus without mention of complication, not stated as uncontrolled     Unspecified sleep apnea      No Known Allergies  Social History     Socioeconomic History    Marital status:      Spouse name: Not on file    Number of children: Not on file    Years of education: Not on file    Highest education level: Not on file   Occupational History    Not on file   Social Needs    Financial resource strain: Not on file    Food insecurity:     Worry: Not on file     Inability: Not on file    Transportation needs:     Medical: Not on file     Non-medical: Not on file   Tobacco Use    Smoking status: Former Smoker     Packs/day: 0.10     Years: 1.00     Pack years: 0.10     Last attempt to quit:      Years since quittin.7    Smokeless tobacco: Never Used   Substance and Sexual Activity    Alcohol use: Yes     Comment: rarely    Drug use: No    Sexual activity: Not on file   Lifestyle    Physical activity:     Days per week: Not on file     Minutes per session: Not on file    Stress: Not on file   Relationships    Social connections:     Talks on phone: Not on file     Gets together: Not on file     Attends Full  Wrist Range of Motion: Normal  Vascular Exam: Normal capillary refill  Neurologic Exam: Negative Tinel's and Phalen's  Intrinsic Muscle Strength: Normal  Extrinsic Muscle Strength: Normal  Special Tests:      Neck Exam: Full range of motion without pain    Additional Comments:     Additional Examinations:  X-Ray Findings: PA lateral and Cody view x-rays of the right hand show 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 Time statement:  I spent 30 minutes, face to face, and greater than 50% was spent counseling with the patient discussing treatment options and answering questions regarding stepwise management of CMC arthritis and the risks and benefits of Aia 16 arthroplasty as well as risks benefits of injection therapy. We also discussed elevation of his blood sugar and controlling his sugar intake    This dictation was performed with a verbal recognition program. It is possible that there are still dictated errors within this office note. All efforts were made to ensure that this office note is accurate. Orders Placed This Encounter   Procedures    XR HAND RIGHT (MIN 3 VIEWS)     Standing Status:   Future     Number of Occurrences:   1     Standing Expiration Date:   9/19/2020    LA ARTHROCENTESIS ASPIR&/INJ SMALL JT/BURSA W/O US    LA BETAMETHASONE ACET&SOD PHOSP    Ashley Avery Aia 16 Controller Plus     Patient was prescribed a Ashley Avery Aia 16 Controller Plus Thumb Splint. The right thumb will require stabilization / immobilization from this semi-rigid / rigid orthosis to improve their function. The orthosis will assist in protecting the affected area, provide functional support and facilitate healing.     The patient was educated and fit by a healthcare professional with expert knowledge and

## 2019-09-19 NOTE — PROGRESS NOTES
Injection adminstration details:  Date & Time: 9/19/19 9:58 AM  Site & Comments:Right first [facility] joint injection administered by Dr. Cat Sims    Betamethasone (30 mg/ml)  NDC:  6406-3452-14  Lot Number: [id number]  EXP: 06/2020    1% Xylocaine (10mg/ml)  NDC:  61750-795-30  Lot Number: [id number]  EXP: 11/22

## 2019-10-24 ENCOUNTER — OFFICE VISIT (OUTPATIENT)
Dept: ORTHOPEDIC SURGERY | Age: 74
End: 2019-10-24
Payer: MEDICARE

## 2019-10-24 VITALS — WEIGHT: 257.06 LBS | RESPIRATION RATE: 17 BRPM | BODY MASS INDEX: 34.82 KG/M2 | HEIGHT: 72 IN

## 2019-10-24 DIAGNOSIS — M18.11 PRIMARY OSTEOARTHRITIS OF FIRST CARPOMETACARPAL JOINT OF RIGHT HAND: Primary | ICD-10-CM

## 2019-10-24 PROCEDURE — 99212 OFFICE O/P EST SF 10 MIN: CPT | Performed by: ORTHOPAEDIC SURGERY

## 2019-10-24 PROCEDURE — 4040F PNEUMOC VAC/ADMIN/RCVD: CPT | Performed by: ORTHOPAEDIC SURGERY

## 2019-10-24 PROCEDURE — G8427 DOCREV CUR MEDS BY ELIG CLIN: HCPCS | Performed by: ORTHOPAEDIC SURGERY

## 2019-10-24 PROCEDURE — G8599 NO ASA/ANTIPLAT THER USE RNG: HCPCS | Performed by: ORTHOPAEDIC SURGERY

## 2019-10-24 PROCEDURE — 3017F COLORECTAL CA SCREEN DOC REV: CPT | Performed by: ORTHOPAEDIC SURGERY

## 2019-10-24 PROCEDURE — G8417 CALC BMI ABV UP PARAM F/U: HCPCS | Performed by: ORTHOPAEDIC SURGERY

## 2019-10-24 PROCEDURE — G8484 FLU IMMUNIZE NO ADMIN: HCPCS | Performed by: ORTHOPAEDIC SURGERY

## 2019-10-24 PROCEDURE — 1036F TOBACCO NON-USER: CPT | Performed by: ORTHOPAEDIC SURGERY

## 2019-10-24 PROCEDURE — 1123F ACP DISCUSS/DSCN MKR DOCD: CPT | Performed by: ORTHOPAEDIC SURGERY

## 2019-11-05 DIAGNOSIS — I25.10 CORONARY ARTERY DISEASE INVOLVING NATIVE CORONARY ARTERY OF NATIVE HEART WITHOUT ANGINA PECTORIS: ICD-10-CM

## 2019-11-05 DIAGNOSIS — E78.00 PURE HYPERCHOLESTEROLEMIA: ICD-10-CM

## 2019-11-05 DIAGNOSIS — E78.2 MIXED HYPERLIPIDEMIA: ICD-10-CM

## 2019-11-05 RX ORDER — ATORVASTATIN CALCIUM 40 MG/1
40 TABLET, FILM COATED ORAL DAILY
Qty: 30 TABLET | Refills: 0 | Status: SHIPPED | OUTPATIENT
Start: 2019-11-05 | End: 2019-11-22 | Stop reason: SDUPTHER

## 2019-11-14 DIAGNOSIS — E78.2 MIXED HYPERLIPIDEMIA: ICD-10-CM

## 2019-11-14 DIAGNOSIS — E78.00 PURE HYPERCHOLESTEROLEMIA: ICD-10-CM

## 2019-11-14 DIAGNOSIS — I25.10 CORONARY ARTERY DISEASE INVOLVING NATIVE CORONARY ARTERY OF NATIVE HEART WITHOUT ANGINA PECTORIS: ICD-10-CM

## 2019-11-19 DIAGNOSIS — Z00.00 ENCOUNTER FOR MEDICARE ANNUAL WELLNESS EXAM: ICD-10-CM

## 2019-11-19 LAB
A/G RATIO: 2.6 (ref 1.1–2.2)
ALBUMIN SERPL-MCNC: 4.9 G/DL (ref 3.4–5)
ALP BLD-CCNC: 45 U/L (ref 40–129)
ALT SERPL-CCNC: 18 U/L (ref 10–40)
ANION GAP SERPL CALCULATED.3IONS-SCNC: 16 MMOL/L (ref 3–16)
AST SERPL-CCNC: 17 U/L (ref 15–37)
BASOPHILS ABSOLUTE: 0 K/UL (ref 0–0.2)
BASOPHILS RELATIVE PERCENT: 0.6 %
BILIRUB SERPL-MCNC: 0.6 MG/DL (ref 0–1)
BUN BLDV-MCNC: 16 MG/DL (ref 7–20)
CALCIUM SERPL-MCNC: 9.3 MG/DL (ref 8.3–10.6)
CHLORIDE BLD-SCNC: 99 MMOL/L (ref 99–110)
CO2: 23 MMOL/L (ref 21–32)
CREAT SERPL-MCNC: 1 MG/DL (ref 0.8–1.3)
EOSINOPHILS ABSOLUTE: 0.1 K/UL (ref 0–0.6)
EOSINOPHILS RELATIVE PERCENT: 2.1 %
GFR AFRICAN AMERICAN: >60
GFR NON-AFRICAN AMERICAN: >60
GLOBULIN: 1.9 G/DL
GLUCOSE BLD-MCNC: 213 MG/DL (ref 70–99)
HCT VFR BLD CALC: 45.8 % (ref 40.5–52.5)
HEMOGLOBIN: 15 G/DL (ref 13.5–17.5)
LYMPHOCYTES ABSOLUTE: 1.6 K/UL (ref 1–5.1)
LYMPHOCYTES RELATIVE PERCENT: 33.7 %
MCH RBC QN AUTO: 31 PG (ref 26–34)
MCHC RBC AUTO-ENTMCNC: 32.7 G/DL (ref 31–36)
MCV RBC AUTO: 94.8 FL (ref 80–100)
MONOCYTES ABSOLUTE: 0.4 K/UL (ref 0–1.3)
MONOCYTES RELATIVE PERCENT: 8.2 %
NEUTROPHILS ABSOLUTE: 2.7 K/UL (ref 1.7–7.7)
NEUTROPHILS RELATIVE PERCENT: 55.4 %
PDW BLD-RTO: 13.6 % (ref 12.4–15.4)
PLATELET # BLD: 208 K/UL (ref 135–450)
PMV BLD AUTO: 9.6 FL (ref 5–10.5)
POTASSIUM SERPL-SCNC: 4.7 MMOL/L (ref 3.5–5.1)
RBC # BLD: 4.83 M/UL (ref 4.2–5.9)
SODIUM BLD-SCNC: 138 MMOL/L (ref 136–145)
TOTAL PROTEIN: 6.8 G/DL (ref 6.4–8.2)
WBC # BLD: 4.9 K/UL (ref 4–11)

## 2019-11-22 ENCOUNTER — OFFICE VISIT (OUTPATIENT)
Dept: CARDIOLOGY CLINIC | Age: 74
End: 2019-11-22
Payer: MEDICARE

## 2019-11-22 ENCOUNTER — OFFICE VISIT (OUTPATIENT)
Dept: FAMILY MEDICINE CLINIC | Age: 74
End: 2019-11-22
Payer: MEDICARE

## 2019-11-22 VITALS
WEIGHT: 257 LBS | RESPIRATION RATE: 16 BRPM | DIASTOLIC BLOOD PRESSURE: 50 MMHG | BODY MASS INDEX: 34.81 KG/M2 | SYSTOLIC BLOOD PRESSURE: 104 MMHG | HEIGHT: 72 IN | OXYGEN SATURATION: 94 % | HEART RATE: 83 BPM

## 2019-11-22 VITALS
DIASTOLIC BLOOD PRESSURE: 62 MMHG | TEMPERATURE: 96.9 F | OXYGEN SATURATION: 95 % | BODY MASS INDEX: 34.71 KG/M2 | HEART RATE: 77 BPM | SYSTOLIC BLOOD PRESSURE: 112 MMHG | WEIGHT: 256 LBS

## 2019-11-22 DIAGNOSIS — G47.33 OBSTRUCTIVE SLEEP APNEA SYNDROME: Chronic | ICD-10-CM

## 2019-11-22 DIAGNOSIS — I10 ESSENTIAL HYPERTENSION: Chronic | ICD-10-CM

## 2019-11-22 DIAGNOSIS — I25.10 CORONARY ARTERY DISEASE INVOLVING NATIVE CORONARY ARTERY OF NATIVE HEART WITHOUT ANGINA PECTORIS: Primary | ICD-10-CM

## 2019-11-22 DIAGNOSIS — I25.10 CORONARY ARTERY DISEASE INVOLVING NATIVE CORONARY ARTERY OF NATIVE HEART WITHOUT ANGINA PECTORIS: Chronic | ICD-10-CM

## 2019-11-22 DIAGNOSIS — E11.9 TYPE 2 DIABETES MELLITUS WITHOUT COMPLICATION, WITHOUT LONG-TERM CURRENT USE OF INSULIN (HCC): Primary | Chronic | ICD-10-CM

## 2019-11-22 DIAGNOSIS — E78.2 MIXED HYPERLIPIDEMIA: ICD-10-CM

## 2019-11-22 DIAGNOSIS — I10 ESSENTIAL HYPERTENSION: ICD-10-CM

## 2019-11-22 DIAGNOSIS — E66.9 NON MORBID OBESITY, UNSPECIFIED OBESITY TYPE: Chronic | ICD-10-CM

## 2019-11-22 LAB — HBA1C MFR BLD: 8.9 %

## 2019-11-22 PROCEDURE — G8599 NO ASA/ANTIPLAT THER USE RNG: HCPCS | Performed by: NURSE PRACTITIONER

## 2019-11-22 PROCEDURE — 99214 OFFICE O/P EST MOD 30 MIN: CPT | Performed by: NURSE PRACTITIONER

## 2019-11-22 PROCEDURE — 99214 OFFICE O/P EST MOD 30 MIN: CPT | Performed by: FAMILY MEDICINE

## 2019-11-22 PROCEDURE — G8427 DOCREV CUR MEDS BY ELIG CLIN: HCPCS | Performed by: FAMILY MEDICINE

## 2019-11-22 PROCEDURE — 1123F ACP DISCUSS/DSCN MKR DOCD: CPT | Performed by: NURSE PRACTITIONER

## 2019-11-22 PROCEDURE — G8417 CALC BMI ABV UP PARAM F/U: HCPCS | Performed by: NURSE PRACTITIONER

## 2019-11-22 PROCEDURE — 83036 HEMOGLOBIN GLYCOSYLATED A1C: CPT | Performed by: FAMILY MEDICINE

## 2019-11-22 PROCEDURE — G8599 NO ASA/ANTIPLAT THER USE RNG: HCPCS | Performed by: FAMILY MEDICINE

## 2019-11-22 PROCEDURE — G8484 FLU IMMUNIZE NO ADMIN: HCPCS | Performed by: NURSE PRACTITIONER

## 2019-11-22 PROCEDURE — 3017F COLORECTAL CA SCREEN DOC REV: CPT | Performed by: FAMILY MEDICINE

## 2019-11-22 PROCEDURE — 1123F ACP DISCUSS/DSCN MKR DOCD: CPT | Performed by: FAMILY MEDICINE

## 2019-11-22 PROCEDURE — 4040F PNEUMOC VAC/ADMIN/RCVD: CPT | Performed by: NURSE PRACTITIONER

## 2019-11-22 PROCEDURE — 3045F PR MOST RECENT HEMOGLOBIN A1C LEVEL 7.0-9.0%: CPT | Performed by: FAMILY MEDICINE

## 2019-11-22 PROCEDURE — 1036F TOBACCO NON-USER: CPT | Performed by: FAMILY MEDICINE

## 2019-11-22 PROCEDURE — 2022F DILAT RTA XM EVC RTNOPTHY: CPT | Performed by: FAMILY MEDICINE

## 2019-11-22 PROCEDURE — G8417 CALC BMI ABV UP PARAM F/U: HCPCS | Performed by: FAMILY MEDICINE

## 2019-11-22 PROCEDURE — 4040F PNEUMOC VAC/ADMIN/RCVD: CPT | Performed by: FAMILY MEDICINE

## 2019-11-22 PROCEDURE — G8484 FLU IMMUNIZE NO ADMIN: HCPCS | Performed by: FAMILY MEDICINE

## 2019-11-22 PROCEDURE — 3017F COLORECTAL CA SCREEN DOC REV: CPT | Performed by: NURSE PRACTITIONER

## 2019-11-22 PROCEDURE — 1036F TOBACCO NON-USER: CPT | Performed by: NURSE PRACTITIONER

## 2019-11-22 PROCEDURE — G8427 DOCREV CUR MEDS BY ELIG CLIN: HCPCS | Performed by: NURSE PRACTITIONER

## 2019-11-22 RX ORDER — PIOGLITAZONEHYDROCHLORIDE 30 MG/1
30 TABLET ORAL DAILY
Qty: 90 TABLET | Refills: 3 | Status: SHIPPED | OUTPATIENT
Start: 2019-11-22 | End: 2020-10-26

## 2019-11-22 RX ORDER — GLIMEPIRIDE 2 MG/1
2 TABLET ORAL 2 TIMES DAILY
Qty: 180 TABLET | Refills: 3 | Status: SHIPPED | OUTPATIENT
Start: 2019-11-22 | End: 2020-10-22 | Stop reason: SDUPTHER

## 2019-11-22 RX ORDER — ATORVASTATIN CALCIUM 40 MG/1
40 TABLET, FILM COATED ORAL DAILY
Qty: 90 TABLET | Refills: 2 | Status: SHIPPED | OUTPATIENT
Start: 2019-11-22 | End: 2020-08-31

## 2019-11-25 RX ORDER — BLOOD SUGAR DIAGNOSTIC
STRIP MISCELLANEOUS
Qty: 100 STRIP | Refills: 3 | Status: SHIPPED | OUTPATIENT
Start: 2019-11-25 | End: 2021-01-05

## 2019-11-26 ENCOUNTER — TELEPHONE (OUTPATIENT)
Dept: FAMILY MEDICINE CLINIC | Age: 74
End: 2019-11-26

## 2019-12-04 DIAGNOSIS — I25.10 CORONARY ARTERY DISEASE INVOLVING NATIVE CORONARY ARTERY OF NATIVE HEART WITHOUT ANGINA PECTORIS: ICD-10-CM

## 2019-12-04 DIAGNOSIS — E78.00 PURE HYPERCHOLESTEROLEMIA: ICD-10-CM

## 2019-12-04 DIAGNOSIS — E78.2 MIXED HYPERLIPIDEMIA: ICD-10-CM

## 2019-12-04 RX ORDER — ATORVASTATIN CALCIUM 40 MG/1
TABLET, FILM COATED ORAL
Qty: 30 TABLET | Refills: 0 | OUTPATIENT
Start: 2019-12-04

## 2019-12-18 RX ORDER — TAMSULOSIN HYDROCHLORIDE 0.4 MG/1
CAPSULE ORAL
Qty: 30 CAPSULE | Refills: 2 | Status: SHIPPED | OUTPATIENT
Start: 2019-12-18 | End: 2020-03-16

## 2020-01-08 NOTE — TELEPHONE ENCOUNTER
Medication:   Requested Prescriptions     Pending Prescriptions Disp Refills    metFORMIN (GLUCOPHAGE) 1000 MG tablet [Pharmacy Med Name: metFORMIN HCL 1,000 MG TABLET] 180 tablet 1     Sig: TAKE ONE TABLET BY MOUTH TWICE A DAY WITH MEALS       Last Filled:  4/3/19 #180, 2 RF     Patient Phone Number: [phone] (home)     Last appt: 11/22/19 DM   Next appt: 2/20/20     Last Labs DM:   Lab Results   Component Value Date    LABA1C 8.9 11/22/2019

## 2020-01-16 ENCOUNTER — TELEPHONE (OUTPATIENT)
Dept: CARDIOLOGY CLINIC | Age: 75
End: 2020-01-16

## 2020-01-29 ENCOUNTER — OFFICE VISIT (OUTPATIENT)
Dept: FAMILY MEDICINE CLINIC | Age: 75
End: 2020-01-29
Payer: MEDICARE

## 2020-01-29 VITALS
OXYGEN SATURATION: 94 % | BODY MASS INDEX: 37.38 KG/M2 | HEART RATE: 71 BPM | SYSTOLIC BLOOD PRESSURE: 160 MMHG | DIASTOLIC BLOOD PRESSURE: 86 MMHG | HEIGHT: 72 IN | WEIGHT: 276 LBS

## 2020-01-29 DIAGNOSIS — Z01.818 PRE-OP EXAMINATION: ICD-10-CM

## 2020-01-29 DIAGNOSIS — E11.9 TYPE 2 DIABETES MELLITUS WITHOUT COMPLICATION, WITHOUT LONG-TERM CURRENT USE OF INSULIN (HCC): Chronic | ICD-10-CM

## 2020-01-29 LAB — HBA1C MFR BLD: 8.2 %

## 2020-01-29 PROCEDURE — G8427 DOCREV CUR MEDS BY ELIG CLIN: HCPCS | Performed by: FAMILY MEDICINE

## 2020-01-29 PROCEDURE — 3046F HEMOGLOBIN A1C LEVEL >9.0%: CPT | Performed by: FAMILY MEDICINE

## 2020-01-29 PROCEDURE — 3017F COLORECTAL CA SCREEN DOC REV: CPT | Performed by: FAMILY MEDICINE

## 2020-01-29 PROCEDURE — 99214 OFFICE O/P EST MOD 30 MIN: CPT | Performed by: FAMILY MEDICINE

## 2020-01-29 PROCEDURE — 1123F ACP DISCUSS/DSCN MKR DOCD: CPT | Performed by: FAMILY MEDICINE

## 2020-01-29 PROCEDURE — G8417 CALC BMI ABV UP PARAM F/U: HCPCS | Performed by: FAMILY MEDICINE

## 2020-01-29 PROCEDURE — 93000 ELECTROCARDIOGRAM COMPLETE: CPT | Performed by: FAMILY MEDICINE

## 2020-01-29 PROCEDURE — G8484 FLU IMMUNIZE NO ADMIN: HCPCS | Performed by: FAMILY MEDICINE

## 2020-01-29 PROCEDURE — 2022F DILAT RTA XM EVC RTNOPTHY: CPT | Performed by: FAMILY MEDICINE

## 2020-01-29 PROCEDURE — 83036 HEMOGLOBIN GLYCOSYLATED A1C: CPT | Performed by: FAMILY MEDICINE

## 2020-01-29 PROCEDURE — 4040F PNEUMOC VAC/ADMIN/RCVD: CPT | Performed by: FAMILY MEDICINE

## 2020-01-29 PROCEDURE — 1036F TOBACCO NON-USER: CPT | Performed by: FAMILY MEDICINE

## 2020-01-29 ASSESSMENT — PATIENT HEALTH QUESTIONNAIRE - PHQ9
SUM OF ALL RESPONSES TO PHQ QUESTIONS 1-9: 0
1. LITTLE INTEREST OR PLEASURE IN DOING THINGS: 0
SUM OF ALL RESPONSES TO PHQ QUESTIONS 1-9: 0
2. FEELING DOWN, DEPRESSED OR HOPELESS: 0
SUM OF ALL RESPONSES TO PHQ9 QUESTIONS 1 & 2: 0

## 2020-01-29 NOTE — PROGRESS NOTES
Chief Complaint:     Luciano Bah is a 69 y.o. male who presents for a preoperative physical examination. He is scheduled to have left shoulder surgery done by Dr. Priya Dowell at [facility] on 2-6-2020.     History of Present Illness:      Left shoulder pain, MRI abnormal  No spp injury, hurts to sleep on left side  Past Medical History:   Diagnosis Date    CAD (coronary artery disease)     Cataract 2000    OD    Deviated septum     Erectile dysfunction     Hyperlipidemia     Hypertension     Obesity     Obstructive sleep apnea syndrome 2/26/2019    Needs work up   [facility] Retinal detachment 1991    OD    Type 2 diabetes mellitus without complication ([address]     Type II or unspecified type diabetes mellitus without mention of complication, not stated as uncontrolled     Unspecified sleep apnea         Review of patient's past surgical history indicates:     Past Surgical History:   Procedure Laterality Date    CARDIAC SURGERY      CATARACT REMOVAL WITH IMPLANT  2002    OD    CORONARY ARTERY BYPASS GRAFT  8/11/2015    Dr. Sol Alejandre - Urgent X3 (LIMA-LAD, SVG-OM2 & PDA)    LAP BAND  9/2008    RETINAL DETACHMENT SURGERY  1999    OD    SINUS SURGERY Left 11/27/2017    FESS                                                   Current Outpatient Medications   Medication Sig Dispense Refill    metFORMIN (GLUCOPHAGE) 1000 MG tablet TAKE ONE TABLET BY MOUTH TWICE A DAY WITH MEALS 180 tablet 2    tamsulosin (FLOMAX) 0.4 MG capsule TAKE ONE CAPSULE BY MOUTH DAILY 30 capsule 2    ONETOUCH VERIO strip USE ONE STRIP TO TEST DAILY AS NEEDED 100 strip 3    glimepiride (AMARYL) 2 MG tablet Take 1 tablet by mouth 2 times daily 180 tablet 3    pioglitazone (ACTOS) 30 MG tablet Take 1 tablet by mouth daily 90 tablet 3    atorvastatin (LIPITOR) 40 MG tablet Take 1 tablet by mouth daily 90 tablet 2    metoprolol tartrate (LOPRESSOR) 25 MG tablet TAKE ONE-HALF TABLET BY MOUTH TWICE A DAY 90 tablet 2    telmisartan (MICARDIS) 20 MG tablet Take 1 tablet by mouth daily 30 tablet 11    aspirin 325 MG EC tablet Take 1 tablet by mouth daily 30 tablet 3    glucose monitoring kit (FREESTYLE) monitoring kit 1 kit by Does not apply route daily as needed 1 kit 0    glucose blood VI test strips (EXACTECH TEST) strip 1 each by In Vitro route 3 times daily As needed. 100 each 3    Lancets MISC One Touch Verio IQ Pt test  each 3     No current facility-administered medications for this visit. Allergies   Allergen Reactions    Ace Inhibitors      hyperkalemia       Social History     Tobacco Use    Smoking status: Former Smoker     Packs/day: 0.10     Years: 1.00     Pack years: 0.10     Last attempt to quit:      Years since quittin.1    Smokeless tobacco: Never Used   Substance Use Topics    Alcohol use: Yes     Comment: rarely    Drug use: No        Family History   Problem Relation Age of Onset    Cancer Father         liver    Diabetes Father     Stroke Mother     Alzheimer's Disease Mother     Diabetes Brother     Sleep Apnea Brother     Heart Attack Son     Sleep Apnea Brother         Review Of Systems    Skin: no abnormal pigmentation, rash, scaling, itching, masses, hair or nail changes  Eyes: negative  Ears/Nose/Throat: negative  Respiratory: negative  Cardiovascular: negative  Gastrointestinal: negative  Genitourinary: negative  Musculoskeletal: negative  Neurologic: negative  Psychiatric: negative  Hematologic/Lymphatic/Immunologic: negative  Endocrine: we stopped farxiga, a1c went up, been on actos, amaryl and metfromin    PHYSICAL EXAMINATION:  BP (!) 160/86   Pulse 71   Ht 6' 0.01\" (1.829 m)   Wt 276 lb (125.2 kg)   SpO2 94%   BMI 37.42 kg/m²   General appearance - healthy, alert, no distress  Skin - Skin color, texture, turgor normal. No rashes or lesions. Head - Normocephalic. No masses, lesions, tenderness or abnormalities  Eyes - conjunctivae/corneas clear. PERRL, EOM's intact.   Ears - External ears normal. Canals clear. TM's normal. Hearing grossly intact to finger rub. Nose/Sinuses - Nares normal. Septum midline. Mucosa normal. No drainage or sinus tenderness. Oropharynx - Lips, mucosa, and tongue normal. Teeth and gums normal. Orop  Neck - Neck supple. No adenopathy. Thyroid symmetric, normal size. No bruits. Back - Back symmetric, no curvature. ROM normal. No CVA tenderness. Lungs - Percussion normal. Good diaphragmatic excursion. Lungs clear  Heart - Regular rate and rhythm, with no rub, murmur or gallop noted. Abdomen - Abdomen soft, non-tender. BS normal. No masses, organomegaly  Extremities - Extremities normal. No deformities, edema, or skin discolora  Musculoskeletal - Spine ROM normal. Muscular strength intact. Peripheral pulses - radial=4/4, dorsalis pedis=4/4,  Neuro - Gait normal. Reflexes normal and symmetric. Sensation grossly normal.  No focal weakness      ASSESSMENT:  Left shoulder pain  Encounter Diagnoses   Name Primary?  Pre-op examination Yes    Type 2 diabetes mellitus without complication, without long-term current use of insulin (HCC)     Obstructive sleep apnea syndrome     Non morbid obesity, unspecified obesity type     Mixed hyperlipidemia     Coronary artery disease involving native coronary artery of native heart without angina pectoris      Per encounter diagnoses  He is medically cleared for surgery and anesthesia. , if preop labs okay    Plan:  No nsaids for 7 days  Per operating surgeon. See also orders filed with this encounter, if any.

## 2020-01-30 ENCOUNTER — TELEPHONE (OUTPATIENT)
Dept: CARDIOLOGY CLINIC | Age: 75
End: 2020-01-30

## 2020-01-30 LAB
A/G RATIO: 1.7 (ref 1.1–2.2)
ALBUMIN SERPL-MCNC: 4.5 G/DL (ref 3.4–5)
ALP BLD-CCNC: 47 U/L (ref 40–129)
ALT SERPL-CCNC: 23 U/L (ref 10–40)
ANION GAP SERPL CALCULATED.3IONS-SCNC: 16 MMOL/L (ref 3–16)
AST SERPL-CCNC: 20 U/L (ref 15–37)
BASOPHILS ABSOLUTE: 0 K/UL (ref 0–0.2)
BASOPHILS RELATIVE PERCENT: 0.6 %
BILIRUB SERPL-MCNC: 0.6 MG/DL (ref 0–1)
BUN BLDV-MCNC: 14 MG/DL (ref 7–20)
CALCIUM SERPL-MCNC: 9.6 MG/DL (ref 8.3–10.6)
CHLORIDE BLD-SCNC: 104 MMOL/L (ref 99–110)
CO2: 25 MMOL/L (ref 21–32)
CREAT SERPL-MCNC: 1 MG/DL (ref 0.8–1.3)
EOSINOPHILS ABSOLUTE: 0.3 K/UL (ref 0–0.6)
EOSINOPHILS RELATIVE PERCENT: 4.2 %
ESTIMATED AVERAGE GLUCOSE: 203 MG/DL
GFR AFRICAN AMERICAN: >60
GFR NON-AFRICAN AMERICAN: >60
GLOBULIN: 2.7 G/DL
GLUCOSE BLD-MCNC: 89 MG/DL (ref 70–99)
HBA1C MFR BLD: 8.7 %
HCT VFR BLD CALC: 41.1 % (ref 40.5–52.5)
HEMOGLOBIN: 13.8 G/DL (ref 13.5–17.5)
LYMPHOCYTES ABSOLUTE: 1.9 K/UL (ref 1–5.1)
LYMPHOCYTES RELATIVE PERCENT: 30.3 %
MCH RBC QN AUTO: 31.6 PG (ref 26–34)
MCHC RBC AUTO-ENTMCNC: 33.5 G/DL (ref 31–36)
MCV RBC AUTO: 94.2 FL (ref 80–100)
MONOCYTES ABSOLUTE: 0.5 K/UL (ref 0–1.3)
MONOCYTES RELATIVE PERCENT: 8.7 %
NEUTROPHILS ABSOLUTE: 3.5 K/UL (ref 1.7–7.7)
NEUTROPHILS RELATIVE PERCENT: 56.2 %
PDW BLD-RTO: 14.3 % (ref 12.4–15.4)
PLATELET # BLD: 221 K/UL (ref 135–450)
PLATELET SLIDE REVIEW: ADEQUATE
PMV BLD AUTO: 9.8 FL (ref 5–10.5)
POTASSIUM SERPL-SCNC: 4.9 MMOL/L (ref 3.5–5.1)
RBC # BLD: 4.36 M/UL (ref 4.2–5.9)
SLIDE REVIEW: NORMAL
SODIUM BLD-SCNC: 145 MMOL/L (ref 136–145)
TOTAL PROTEIN: 7.2 G/DL (ref 6.4–8.2)
WBC # BLD: 6.2 K/UL (ref 4–11)

## 2020-02-03 ENCOUNTER — TELEPHONE (OUTPATIENT)
Dept: FAMILY MEDICINE CLINIC | Age: 75
End: 2020-02-03

## 2020-02-10 ENCOUNTER — TELEPHONE (OUTPATIENT)
Dept: FAMILY MEDICINE CLINIC | Age: 75
End: 2020-02-10

## 2020-02-10 NOTE — TELEPHONE ENCOUNTER
Chani Polk, e-mail,[facility] lab, request for updated lab codes  Sent back re:  DOS- 11-19-19, I10; I25.10

## 2020-02-12 ENCOUNTER — TELEPHONE (OUTPATIENT)
Dept: FAMILY MEDICINE CLINIC | Age: 75
End: 2020-02-12

## 2020-02-12 RX ORDER — CEFUROXIME AXETIL 250 MG/1
250 TABLET ORAL 2 TIMES DAILY
Qty: 20 TABLET | Refills: 0 | Status: SHIPPED | OUTPATIENT
Start: 2020-02-12 | End: 2020-02-22

## 2020-02-12 NOTE — TELEPHONE ENCOUNTER
Spoke with patient - he states that in 2017 he was treated by ENT for strep infection in sinuses. He was on several antibiotics and eventually needed surgery of the sinuses. He states that he is having similar symptoms. He is unable to blow anything out of his left sinus and it always feels clogged. Patient states that yesterday his nose started to run. He states the drainage was greenish/yellow and smelled foul. Patient states that the smell was strong enough that others in the room could smell it. He is not having any other concerns - he denies, HA, fevers or dizziness. Patient called his ENT and was advised to call PCP.

## 2020-02-28 ENCOUNTER — TELEPHONE (OUTPATIENT)
Dept: FAMILY MEDICINE CLINIC | Age: 75
End: 2020-02-28

## 2020-02-28 RX ORDER — AMOXICILLIN AND CLAVULANATE POTASSIUM 875; 125 MG/1; MG/1
1 TABLET, FILM COATED ORAL 2 TIMES DAILY
Qty: 14 TABLET | Refills: 0 | Status: SHIPPED | OUTPATIENT
Start: 2020-02-28 | End: 2020-03-06

## 2020-03-16 RX ORDER — TAMSULOSIN HYDROCHLORIDE 0.4 MG/1
CAPSULE ORAL
Qty: 30 CAPSULE | Refills: 2 | Status: SHIPPED | OUTPATIENT
Start: 2020-03-16 | End: 2020-04-10 | Stop reason: SDUPTHER

## 2020-04-03 ENCOUNTER — TELEPHONE (OUTPATIENT)
Dept: PULMONOLOGY | Age: 75
End: 2020-04-03

## 2020-04-10 RX ORDER — TAMSULOSIN HYDROCHLORIDE 0.4 MG/1
CAPSULE ORAL
Qty: 90 CAPSULE | Refills: 0 | Status: SHIPPED | OUTPATIENT
Start: 2020-04-10 | End: 2020-09-15

## 2020-04-17 ENCOUNTER — TELEPHONE (OUTPATIENT)
Dept: FAMILY MEDICINE CLINIC | Age: 75
End: 2020-04-17

## 2020-04-17 NOTE — TELEPHONE ENCOUNTER
Pt states that he is unable to do a VV.  He does not have the capability for any appts  Please advise

## 2020-04-20 RX ORDER — SILDENAFIL 100 MG/1
TABLET, FILM COATED ORAL
Qty: 10 TABLET | Refills: 2 | Status: SHIPPED | OUTPATIENT
Start: 2020-04-20 | End: 2020-12-04

## 2020-04-20 NOTE — TELEPHONE ENCOUNTER
Medication:   Requested Prescriptions     Pending Prescriptions Disp Refills    sildenafil (VIAGRA) 100 MG tablet [Pharmacy Med Name: SILDENAFIL 100 MG TABLET] 10 tablet 2     Sig: TAKE ONE TABLET BY MOUTH AS NEEDED FOR ERECTILE DYSFUNCTION        Last Filled:   05/15/2019 #10 3rf     Patient Phone Number: [phone] (home)     Last appt: 1/29/2020   Next appt: 5/27/2020    Last OARRS: No flowsheet data found.

## 2020-05-01 RX ORDER — TELMISARTAN 20 MG/1
20 TABLET ORAL DAILY
Qty: 90 TABLET | Refills: 3 | Status: SHIPPED | OUTPATIENT
Start: 2020-05-01 | End: 2021-02-15 | Stop reason: SDUPTHER

## 2020-05-01 NOTE — TELEPHONE ENCOUNTER
Medication:   Requested Prescriptions     Pending Prescriptions Disp Refills    telmisartan (MICARDIS) 20 MG tablet 30 tablet 11     Sig: Take 1 tablet by mouth daily       Last Filled:  06/12/2019 #30 11rf     Patient Phone Number: [phone] (home)     Last appt: 1/29/2020   Next appt: 7/22/2020    Lab Results   Component Value Date     01/29/2020    K 4.9 01/29/2020     01/29/2020    CO2 25 01/29/2020    BUN 14 01/29/2020    CREATININE 1.0 01/29/2020    GLUCOSE 89 01/29/2020    CALCIUM 9.6 01/29/2020    PROT 7.2 01/29/2020    LABALBU 4.5 01/29/2020    BILITOT 0.6 01/29/2020    ALKPHOS 47 01/29/2020    AST 20 01/29/2020    ALT 23 01/29/2020    LABGLOM >60 01/29/2020    GFRAA >60 01/29/2020    AGRATIO 1.7 01/29/2020    GLOB 2.7 01/29/2020

## 2020-05-01 NOTE — TELEPHONE ENCOUNTER
Medication and Quantity requested: telmisartan (MICARDIS) 20 MG tablet, 90 day supply         Last Visit  1/29/20    Pharmacy and phone number updated in [facility]:  Yes  Adrian    If patient agrees.   Please see attachment

## 2020-05-04 ENCOUNTER — VIRTUAL VISIT (OUTPATIENT)
Dept: ORTHOPEDIC SURGERY | Age: 75
End: 2020-05-04
Payer: MEDICARE

## 2020-05-04 VITALS — WEIGHT: 265 LBS | HEIGHT: 72 IN | BODY MASS INDEX: 35.89 KG/M2 | RESPIRATION RATE: 16 BRPM

## 2020-05-04 PROCEDURE — 4040F PNEUMOC VAC/ADMIN/RCVD: CPT | Performed by: ORTHOPAEDIC SURGERY

## 2020-05-04 PROCEDURE — G8417 CALC BMI ABV UP PARAM F/U: HCPCS | Performed by: ORTHOPAEDIC SURGERY

## 2020-05-04 PROCEDURE — 1036F TOBACCO NON-USER: CPT | Performed by: ORTHOPAEDIC SURGERY

## 2020-05-04 PROCEDURE — 3017F COLORECTAL CA SCREEN DOC REV: CPT | Performed by: ORTHOPAEDIC SURGERY

## 2020-05-04 PROCEDURE — 20600 DRAIN/INJ JOINT/BURSA W/O US: CPT | Performed by: ORTHOPAEDIC SURGERY

## 2020-05-04 PROCEDURE — G8427 DOCREV CUR MEDS BY ELIG CLIN: HCPCS | Performed by: ORTHOPAEDIC SURGERY

## 2020-05-04 PROCEDURE — 99213 OFFICE O/P EST LOW 20 MIN: CPT | Performed by: ORTHOPAEDIC SURGERY

## 2020-05-04 PROCEDURE — 1123F ACP DISCUSS/DSCN MKR DOCD: CPT | Performed by: ORTHOPAEDIC SURGERY

## 2020-05-04 RX ORDER — BETAMETHASONE SODIUM PHOSPHATE AND BETAMETHASONE ACETATE 3; 3 MG/ML; MG/ML
6 INJECTION, SUSPENSION INTRA-ARTICULAR; INTRALESIONAL; INTRAMUSCULAR; SOFT TISSUE ONCE
Status: COMPLETED | OUTPATIENT
Start: 2020-05-04 | End: 2020-05-04

## 2020-05-04 RX ADMIN — BETAMETHASONE SODIUM PHOSPHATE AND BETAMETHASONE ACETATE 6 MG: 3; 3 INJECTION, SUSPENSION INTRA-ARTICULAR; INTRALESIONAL; INTRAMUSCULAR; SOFT TISSUE at 14:49

## 2020-05-04 NOTE — PROGRESS NOTES
complication, not stated as uncontrolled     Unspecified sleep apnea      Current Outpatient Medications on File Prior to Visit   Medication Sig Dispense Refill    telmisartan (MICARDIS) 20 MG tablet Take 1 tablet by mouth daily 90 tablet 3    sildenafil (VIAGRA) 100 MG tablet TAKE ONE TABLET BY MOUTH AS NEEDED FOR ERECTILE DYSFUNCTION 10 tablet 2    tamsulosin (FLOMAX) 0.4 MG capsule TAKE ONE CAPSULE BY MOUTH DAILY 90 capsule 0    metFORMIN (GLUCOPHAGE) 1000 MG tablet TAKE ONE TABLET BY MOUTH TWICE A DAY WITH MEALS 180 tablet 2    ONETOUCH VERIO strip USE ONE STRIP TO TEST DAILY AS NEEDED 100 strip 3    glimepiride (AMARYL) 2 MG tablet Take 1 tablet by mouth 2 times daily 180 tablet 3    pioglitazone (ACTOS) 30 MG tablet Take 1 tablet by mouth daily 90 tablet 3    atorvastatin (LIPITOR) 40 MG tablet Take 1 tablet by mouth daily 90 tablet 2    metoprolol tartrate (LOPRESSOR) 25 MG tablet TAKE ONE-HALF TABLET BY MOUTH TWICE A DAY 90 tablet 2    aspirin 325 MG EC tablet Take 1 tablet by mouth daily 30 tablet 3    glucose monitoring kit (FREESTYLE) monitoring kit 1 kit by Does not apply route daily as needed 1 kit 0    glucose blood VI test strips (EXACTECH TEST) strip 1 each by In Vitro route 3 times daily As needed. 100 each 3    Lancets MISC One Touch Verio IQ Pt test  each 3     No current facility-administered medications on file prior to visit.       Allergies   Allergen Reactions    Ace Inhibitors      hyperkalemia     Social History     Socioeconomic History    Marital status:      Spouse name: Not on file    Number of children: Not on file    Years of education: Not on file    Highest education level: Not on file   Occupational History    Not on file   Social Needs    Financial resource strain: Not on file    Food insecurity     Worry: Not on file     Inability: Not on file    Transportation needs     Medical: Not on file     Non-medical: Not on file   Tobacco Use   

## 2020-05-12 ENCOUNTER — VIRTUAL VISIT (OUTPATIENT)
Dept: PULMONOLOGY | Age: 75
End: 2020-05-12
Payer: MEDICARE

## 2020-05-12 VITALS — BODY MASS INDEX: 36.58 KG/M2 | WEIGHT: 276 LBS | HEIGHT: 73 IN

## 2020-05-12 PROCEDURE — 3017F COLORECTAL CA SCREEN DOC REV: CPT | Performed by: NURSE PRACTITIONER

## 2020-05-12 PROCEDURE — G8427 DOCREV CUR MEDS BY ELIG CLIN: HCPCS | Performed by: NURSE PRACTITIONER

## 2020-05-12 PROCEDURE — 3052F HG A1C>EQUAL 8.0%<EQUAL 9.0%: CPT | Performed by: NURSE PRACTITIONER

## 2020-05-12 PROCEDURE — 4040F PNEUMOC VAC/ADMIN/RCVD: CPT | Performed by: NURSE PRACTITIONER

## 2020-05-12 PROCEDURE — 99214 OFFICE O/P EST MOD 30 MIN: CPT | Performed by: NURSE PRACTITIONER

## 2020-05-12 PROCEDURE — 2022F DILAT RTA XM EVC RTNOPTHY: CPT | Performed by: NURSE PRACTITIONER

## 2020-05-12 PROCEDURE — 1123F ACP DISCUSS/DSCN MKR DOCD: CPT | Performed by: NURSE PRACTITIONER

## 2020-05-12 ASSESSMENT — SLEEP AND FATIGUE QUESTIONNAIRES
ESS TOTAL SCORE: 1
HOW LIKELY ARE YOU TO NOD OFF OR FALL ASLEEP WHEN YOU ARE A PASSENGER IN A CAR FOR AN HOUR WITHOUT A BREAK: 0
HOW LIKELY ARE YOU TO NOD OFF OR FALL ASLEEP WHILE LYING DOWN TO REST IN THE AFTERNOON WHEN CIRCUMSTANCES PERMIT: 0
HOW LIKELY ARE YOU TO NOD OFF OR FALL ASLEEP WHILE SITTING INACTIVE IN A PUBLIC PLACE: 0
HOW LIKELY ARE YOU TO NOD OFF OR FALL ASLEEP WHILE SITTING AND READING: 0
HOW LIKELY ARE YOU TO NOD OFF OR FALL ASLEEP WHILE SITTING QUIETLY AFTER LUNCH WITHOUT ALCOHOL: 0
HOW LIKELY ARE YOU TO NOD OFF OR FALL ASLEEP WHILE WATCHING TV: 1
HOW LIKELY ARE YOU TO NOD OFF OR FALL ASLEEP IN A CAR, WHILE STOPPED FOR A FEW MINUTES IN TRAFFIC: 0
HOW LIKELY ARE YOU TO NOD OFF OR FALL ASLEEP WHILE SITTING AND TALKING TO SOMEONE: 0

## 2020-05-12 NOTE — LETTER
[facility]  [address] [place] [address]  Phone: [phone]  Fax: [phone]    May 12, 2020       Patient: Preet Graham   MR Number: [id number]   YOB: 1945   Date of Visit: 5/12/2020       Riki Dietrich was seen for a follow up visit today. Here is my assessment and plan as well as an attached copy of his visit today:    Type 2 diabetes mellitus without complication ([facility] [address] [address]  Chronic- Stable. Cont meds per PCP and other physicians. Non morbid obesity, unspecified obesity type  Chronic-Stable. Encouraged him to work on weight loss through diet and exercise. Essential hypertension  Chronic- Stable. Cont meds per PCP and other physicians. CAD (coronary artery disease)  Chronic- Stable. Cont meds per PCP and other physicians. Obstructive sleep apnea syndrome  Reviewed compliance download with pt. Supplies and parts as needed for his machine. These are medically necessary. Continue medications per his PCP and other physicians. Limit caffeine use after 3pm.  Encouraged him to work on weight loss through diet and exercise. Diagnoses of Type 2 diabetes mellitus without complication, without long-term current use of insulin ([facility] Non morbid obesity, unspecified obesity type, Essential hypertension, and Coronary artery disease involving native coronary artery of native heart without angina pectoris were pertinent to this visit. The chronic medical conditions listed are directly related to the primary diagnosis listed above. The management of the primary diagnosis affects the secondary diagnosis and vice versa. If you have questions or concerns, please do not hesitate to call me. I look forward to following Devyn Goodson along with you. Sincerely,    DARVIN Mercado - CNP    CC providers:  Ira Greene MD  [address]

## 2020-05-12 NOTE — PROGRESS NOTES
on using, cleaning, and beginning tube temperature and humidifier   -F/U: 12 month. No orders of the defined types were placed in this encounter. No orders of the defined types were placed in this encounter. No orders of the defined types were placed in this encounter.       Jennyfer Sheehan, MSN, RN, CNP

## 2020-05-12 NOTE — PROGRESS NOTES
Royal Donovan         : 1945    Diagnosis: [x] FRANCISCO (G47.33) [] CSA (G47.31) [] Apnea (G47.30)   Length of Need: [x] 12 Months [] 99 Months [] Other:    Machine (ALEXIS!): [x] Respironics Dream Station      Auto [] ResMed AirSense     Auto [] Other:     []  CPAP () [] Bilevel ()   Mode: [] Auto [] Spontaneous    Mode: [] Auto [] Spontaneous                            Comfort Settings:   - Ramp Pressure:  cmH2O                                        - Ramp time: 15 min                                     -  Flex/EPR - 3 full time                                    - For ResMed Bilevel (TiMax-4 sec   TiMin- 0.2 sec)     Humidifier: [x] Heated ()        [x] Water chamber replacement ()/ 1 per 6 months        Mask:   [x] Nasal () /1 per 3 months [] Full Face () /1 per 3 months   [x] Patient choice -Size and fit mask [] Patient Choice - Size and fit mask   [] Dispense:  [] Dispense:    [x] Headgear () / 1 per 3 months [] Headgear () / 1 per 3 months   [] Replacement Nasal Cushion ()/2 per month [] Interface Replacement ()/1 per month   [x] Replacement Nasal Pillows ()/2 per month         Tubing: [x] Heated ()/1 per 3 months    [] Standard ()/1 per 3 months [] Other:           Filters: [x] Non-disposable ()/1 per 6 months     [x] Ultra-Fine, Disposable ()/2 per month        Miscellaneous: [] Chin Strap ()/ 1 per 6 months [] O2 bleed-in:       LPM   [] Oximetry on CPAP/Bilevel []  Other:    [x] Modem: ()         Start Order Date: 20    MEDICAL JUSTIFICATION:  I, the undersigned, certify that the above prescribed supplies are medically necessary for this patients wellbeing. In my opinion, the supplies are both reasonable and necessary in reference to accepted standards of medicalpractice in treatment of this patients condition.     DARVIN Olivaerz - CNP      NPI: [id number]       Order Signed Date:

## 2020-07-22 ENCOUNTER — OFFICE VISIT (OUTPATIENT)
Dept: FAMILY MEDICINE CLINIC | Age: 75
End: 2020-07-22
Payer: MEDICARE

## 2020-07-22 VITALS
HEART RATE: 75 BPM | OXYGEN SATURATION: 95 % | DIASTOLIC BLOOD PRESSURE: 80 MMHG | SYSTOLIC BLOOD PRESSURE: 130 MMHG | HEIGHT: 73 IN | BODY MASS INDEX: 38.43 KG/M2 | TEMPERATURE: 96.6 F | WEIGHT: 290 LBS

## 2020-07-22 LAB — HBA1C MFR BLD: 8.1 %

## 2020-07-22 PROCEDURE — G8427 DOCREV CUR MEDS BY ELIG CLIN: HCPCS | Performed by: FAMILY MEDICINE

## 2020-07-22 PROCEDURE — G8417 CALC BMI ABV UP PARAM F/U: HCPCS | Performed by: FAMILY MEDICINE

## 2020-07-22 PROCEDURE — 3017F COLORECTAL CA SCREEN DOC REV: CPT | Performed by: FAMILY MEDICINE

## 2020-07-22 PROCEDURE — 1036F TOBACCO NON-USER: CPT | Performed by: FAMILY MEDICINE

## 2020-07-22 PROCEDURE — 99214 OFFICE O/P EST MOD 30 MIN: CPT | Performed by: FAMILY MEDICINE

## 2020-07-22 PROCEDURE — 83036 HEMOGLOBIN GLYCOSYLATED A1C: CPT | Performed by: FAMILY MEDICINE

## 2020-07-22 PROCEDURE — 4040F PNEUMOC VAC/ADMIN/RCVD: CPT | Performed by: FAMILY MEDICINE

## 2020-07-22 PROCEDURE — 1123F ACP DISCUSS/DSCN MKR DOCD: CPT | Performed by: FAMILY MEDICINE

## 2020-07-22 RX ORDER — DULAGLUTIDE 0.75 MG/.5ML
0.75 INJECTION, SOLUTION SUBCUTANEOUS WEEKLY
Qty: 4 PEN | Refills: 5 | Status: SHIPPED | OUTPATIENT
Start: 2020-07-22 | End: 2020-10-22 | Stop reason: SDUPTHER

## 2020-07-22 NOTE — PROGRESS NOTES
Kelleen Cushing is a 69 y.o. male. Lady Brandi made sugars go up he is stopped it now weight is gone up and swelling has gone up  Currently taking Actos 30, metformin thousand twice daily, and glimepiride 2 mg twice daily  Sees opthal JOEY  Sees hand surgeon for cmp right hand djd - cortisone shots help, may need surgery  Swims qd  Either swollen, no shortness of breath  Meds, vitamins and allergies reviewed with pt    ROS: No TIA's or unusual headaches, no dysphagia. No prolonged cough. No dyspnea or chest pain on exertion. No abdominal pain, change in bowel habits, black or bloody stools. No urinary tract symptoms. No new or unusual musculoskeletal symptoms. Prior to Visit Medications    Medication Sig Taking?  Authorizing Provider   telmisartan (MICARDIS) 20 MG tablet Take 1 tablet by mouth daily Yes Gill Garcia MD   sildenafil (VIAGRA) 100 MG tablet TAKE ONE TABLET BY MOUTH AS NEEDED FOR ERECTILE DYSFUNCTION Yes Gill Garcia MD   tamsulosin (FLOMAX) 0.4 MG capsule TAKE ONE CAPSULE BY MOUTH DAILY Yes Gill Garcia MD   metFORMIN (GLUCOPHAGE) 1000 MG tablet TAKE ONE TABLET BY MOUTH TWICE A DAY WITH MEALS Yes Gill Garcia MD   ONETOUCH VERIO strip USE ONE STRIP TO TEST DAILY AS NEEDED Yes Gill Garcia MD   glimepiride (AMARYL) 2 MG tablet Take 1 tablet by mouth 2 times daily Yes Gill Garcia MD   pioglitazone (ACTOS) 30 MG tablet Take 1 tablet by mouth daily Yes Gill Garcia MD   atorvastatin (LIPITOR) 40 MG tablet Take 1 tablet by mouth daily Yes Nevelyglenny Pollen, APRN - CNP   metoprolol tartrate (LOPRESSOR) 25 MG tablet TAKE ONE-HALF TABLET BY MOUTH TWICE A DAY Yes Schuyler Kinney DO   aspirin 325 MG EC tablet Take 1 tablet by mouth daily Yes Gill Garcia MD   glucose monitoring kit (FREESTYLE) monitoring kit 1 kit by Does not apply route daily as needed Yes Gill Garcia MD   glucose blood VI test strips (EXACTECH TEST) strip 1 each by In Vitro route 3 times daily As needed. Yes Elvia Dooley MD   Lancets MISC One Touch Verio IQ Pt test TID Yes Elvia Dooley MD       Past Medical History:   Diagnosis Date    CAD (coronary artery disease)     Cataract     OD    Deviated septum     Erectile dysfunction     Hyperlipidemia     Hypertension     Obesity     Obstructive sleep apnea syndrome 2019    Needs work up   [facility] Retinal detachment     OD    Type 2 diabetes mellitus without complication ([address]     Type II or unspecified type diabetes mellitus without mention of complication, not stated as uncontrolled     Unspecified sleep apnea        Social History     Tobacco Use    Smoking status: Former Smoker     Packs/day: 0.10     Years: 1.00     Pack years: 0.10     Last attempt to quit:      Years since quittin.5    Smokeless tobacco: Never Used   Substance Use Topics    Alcohol use: Yes     Comment: rarely    Drug use: No       Family History   Problem Relation Age of Onset   Rick Filter Cancer Father         liver    Diabetes Father     Stroke Mother     Alzheimer's Disease Mother     Diabetes Brother     Sleep Apnea Brother     Heart Attack Son     Sleep Apnea Brother        Allergies   Allergen Reactions    Ace Inhibitors      hyperkalemia       OBJECTIVE:  /80   Pulse 75   Temp 96.6 °F (35.9 °C)   Ht 6' 0.99\" (1.854 m)   Wt 290 lb (131.5 kg)   SpO2 95%   BMI 38.27 kg/m²   GEN:  in NAD, obese weight is up 10 pounds  NECK:  Supple without adenopathy. No bruits  CV:  Regular rate and rhythm, S1 and S2 normal, no murmurs, clicks  PULM:  Chest is clear, no wheezing ,  symmetric air entry throughout both lung fields. ABD: Soft, NT  EXT: No rash, 1+ bilateral pitting ankle edema  NEURO: nonfocal  Lab Results   Component Value Date    LABA1C 8.2 2020     Lab Results   Component Value Date    .0 2020     Lab Results   Component Value Date    CHOL 162 05/15/2019    CHOL 156 06/15/2017    CHOL 160

## 2020-08-03 ENCOUNTER — OFFICE VISIT (OUTPATIENT)
Dept: ORTHOPEDIC SURGERY | Age: 75
End: 2020-08-03
Payer: MEDICARE

## 2020-08-03 VITALS — BODY MASS INDEX: 38.43 KG/M2 | HEIGHT: 73 IN | WEIGHT: 290 LBS | RESPIRATION RATE: 17 BRPM

## 2020-08-03 PROBLEM — M19.049 CMC ARTHRITIS: Status: ACTIVE | Noted: 2020-08-03

## 2020-08-03 PROCEDURE — L3908 WHO COCK-UP NONMOLDE PRE OTS: HCPCS | Performed by: ORTHOPAEDIC SURGERY

## 2020-08-03 PROCEDURE — 20551 NJX 1 TENDON ORIGIN/INSJ: CPT | Performed by: ORTHOPAEDIC SURGERY

## 2020-08-03 PROCEDURE — 1123F ACP DISCUSS/DSCN MKR DOCD: CPT | Performed by: ORTHOPAEDIC SURGERY

## 2020-08-03 PROCEDURE — 3017F COLORECTAL CA SCREEN DOC REV: CPT | Performed by: ORTHOPAEDIC SURGERY

## 2020-08-03 PROCEDURE — 99213 OFFICE O/P EST LOW 20 MIN: CPT | Performed by: ORTHOPAEDIC SURGERY

## 2020-08-03 PROCEDURE — G8417 CALC BMI ABV UP PARAM F/U: HCPCS | Performed by: ORTHOPAEDIC SURGERY

## 2020-08-03 PROCEDURE — 4040F PNEUMOC VAC/ADMIN/RCVD: CPT | Performed by: ORTHOPAEDIC SURGERY

## 2020-08-03 PROCEDURE — G8427 DOCREV CUR MEDS BY ELIG CLIN: HCPCS | Performed by: ORTHOPAEDIC SURGERY

## 2020-08-03 PROCEDURE — 1036F TOBACCO NON-USER: CPT | Performed by: ORTHOPAEDIC SURGERY

## 2020-08-03 RX ORDER — BETAMETHASONE SODIUM PHOSPHATE AND BETAMETHASONE ACETATE 3; 3 MG/ML; MG/ML
6 INJECTION, SUSPENSION INTRA-ARTICULAR; INTRALESIONAL; INTRAMUSCULAR; SOFT TISSUE ONCE
Status: COMPLETED | OUTPATIENT
Start: 2020-08-03 | End: 2020-08-03

## 2020-08-03 RX ADMIN — BETAMETHASONE SODIUM PHOSPHATE AND BETAMETHASONE ACETATE 6 MG: 3; 3 INJECTION, SUSPENSION INTRA-ARTICULAR; INTRALESIONAL; INTRAMUSCULAR; SOFT TISSUE at 13:44

## 2020-08-03 NOTE — PROGRESS NOTES
5    telmisartan (MICARDIS) 20 MG tablet Take 1 tablet by mouth daily 90 tablet 3    sildenafil (VIAGRA) 100 MG tablet TAKE ONE TABLET BY MOUTH AS NEEDED FOR ERECTILE DYSFUNCTION 10 tablet 2    tamsulosin (FLOMAX) 0.4 MG capsule TAKE ONE CAPSULE BY MOUTH DAILY 90 capsule 0    metFORMIN (GLUCOPHAGE) 1000 MG tablet TAKE ONE TABLET BY MOUTH TWICE A DAY WITH MEALS 180 tablet 2    ONETOUCH VERIO strip USE ONE STRIP TO TEST DAILY AS NEEDED 100 strip 3    glimepiride (AMARYL) 2 MG tablet Take 1 tablet by mouth 2 times daily 180 tablet 3    pioglitazone (ACTOS) 30 MG tablet Take 1 tablet by mouth daily 90 tablet 3    atorvastatin (LIPITOR) 40 MG tablet Take 1 tablet by mouth daily 90 tablet 2    metoprolol tartrate (LOPRESSOR) 25 MG tablet TAKE ONE-HALF TABLET BY MOUTH TWICE A DAY 90 tablet 2    aspirin 325 MG EC tablet Take 1 tablet by mouth daily 30 tablet 3    glucose monitoring kit (FREESTYLE) monitoring kit 1 kit by Does not apply route daily as needed 1 kit 0    glucose blood VI test strips (EXACTECH TEST) strip 1 each by In Vitro route 3 times daily As needed. 100 each 3    Lancets MISC One Touch Verio IQ Pt test  each 3     No current facility-administered medications on file prior to visit.       Allergies   Allergen Reactions    Ace Inhibitors      hyperkalemia     Social History     Socioeconomic History    Marital status:      Spouse name: Not on file    Number of children: Not on file    Years of education: Not on file    Highest education level: Not on file   Occupational History    Not on file   Social Needs    Financial resource strain: Not on file    Food insecurity     Worry: Not on file     Inability: Not on file    Transportation needs     Medical: Not on file     Non-medical: Not on file   Tobacco Use    Smoking status: Former Smoker     Packs/day: 0.10     Years: 1.00     Pack years: 0.10     Last attempt to quit:      Years since quittin.6    Smokeless tobacco: Never Used   Substance and Sexual Activity    Alcohol use: Yes     Comment: rarely    Drug use: No    Sexual activity: Not on file   Lifestyle    Physical activity     Days per week: Not on file     Minutes per session: Not on file    Stress: Not on file   Relationships    Social connections     Talks on phone: Not on file     Gets together: Not on file     Attends [employer] service: Not on file     Active member of club or organization: Not on file     Attends meetings of clubs or organizations: Not on file     Relationship status: Not on file    Intimate partner violence     Fear of current or ex partner: Not on file     Emotionally abused: Not on file     Physically abused: Not on file     Forced sexual activity: Not on file   Other Topics Concern    Not on file   Social History Narrative    Not on file     Family History   Problem Relation Age of Onset    Cancer Father         liver    Diabetes Father     Stroke Mother     Alzheimer's Disease Mother     Diabetes Brother     Sleep Apnea Brother     Heart Attack Son     Sleep Apnea Brother        Physical Exam  Constitutional: Normal nutritional status  Mental Status: Alert and oriented  Skin: No rashes or erythema  Lymphatic: No lymphadenopathy    Hand Examination: Thumb ballottement and thumb grind test show definite crepitation and grinding but really minimal discomfort today. Finkelstein's test however is a new finding which is quite uncomfortable particularly over the distal end of the first dorsal compartment    Additional Comments:     Additional Examinations:  X-Ray Findings: I reviewed his previous radiographs and he has grade 2 osteoarthritis of the first carpometacarpal joint no significant radial scaphoid arthritis  Additional Diagnostic Test Findings:    Office Procedures: After discussing the procedure with the patient I injected the right 1st dorsal compartment.   We discussed risks and benefits of the injection and then

## 2020-08-07 ENCOUNTER — PATIENT MESSAGE (OUTPATIENT)
Dept: FAMILY MEDICINE CLINIC | Age: 75
End: 2020-08-07

## 2020-08-31 ENCOUNTER — OFFICE VISIT (OUTPATIENT)
Dept: ORTHOPEDIC SURGERY | Age: 75
End: 2020-08-31
Payer: MEDICARE

## 2020-08-31 VITALS — WEIGHT: 290 LBS | BODY MASS INDEX: 38.43 KG/M2 | RESPIRATION RATE: 17 BRPM | HEIGHT: 73 IN

## 2020-08-31 PROBLEM — M65.4 DE QUERVAIN'S TENOSYNOVITIS: Status: ACTIVE | Noted: 2020-08-31

## 2020-08-31 PROCEDURE — G8427 DOCREV CUR MEDS BY ELIG CLIN: HCPCS | Performed by: ORTHOPAEDIC SURGERY

## 2020-08-31 PROCEDURE — G8417 CALC BMI ABV UP PARAM F/U: HCPCS | Performed by: ORTHOPAEDIC SURGERY

## 2020-08-31 PROCEDURE — 1123F ACP DISCUSS/DSCN MKR DOCD: CPT | Performed by: ORTHOPAEDIC SURGERY

## 2020-08-31 PROCEDURE — 3017F COLORECTAL CA SCREEN DOC REV: CPT | Performed by: ORTHOPAEDIC SURGERY

## 2020-08-31 PROCEDURE — 1036F TOBACCO NON-USER: CPT | Performed by: ORTHOPAEDIC SURGERY

## 2020-08-31 PROCEDURE — 99213 OFFICE O/P EST LOW 20 MIN: CPT | Performed by: ORTHOPAEDIC SURGERY

## 2020-08-31 PROCEDURE — 4040F PNEUMOC VAC/ADMIN/RCVD: CPT | Performed by: ORTHOPAEDIC SURGERY

## 2020-08-31 NOTE — TELEPHONE ENCOUNTER
Received refill request for metoprolol 25 mg  from [facility].     Last OV: 11/22/19    Last Labs: 7/22/20    Last Refill: 11/14/19    Next Appt: 11/23/20

## 2020-08-31 NOTE — PROGRESS NOTES
Assessment: Underlying CMC arthritis but on last visit he had classic de Quervain's Shamika synovitis we gave him an injection and almost completely eliminated this portion of his symptoms. Those symptoms were so severe that he was really disabled by them    Treatment Plan: He now just has moderate symptoms of CMC arthritis and I think he is okay with just living with these for a while we did discuss the operative procedure for [facility] arthroplasty as well as de Quervain's release and at this point I think were going to continue with conservative treatment    Return if symptoms worsen or fail to improve. History of Present Illness  Tyrel Sheehan is a 69 y.o. male. Location:  Right wrist and right thumb Severity: mild pain Duration: several months  Modifying factors: the cortisone injection 8/3/20 for dequervains helped a lot. He still has a mild ache in his right thumb CMC arthritis Associated symptoms: none    Review of Systems  Pertinent items are noted in HPI  Denies fever chills, confusion and bowel and bladder active change  Complete Review of Systems reviewed from patient history form dated 9/19/19 and available in the patients chart under the media tab. Vital Signs  Vitals:    08/31/20 1353   Resp: 17   Weight: 290 lb (131.5 kg)   Height: 6' 1\" (1.854 m)     Body mass index is 38.26 kg/m².      Contributory History  None    Medical History  Past Medical History:   Diagnosis Date    CAD (coronary artery disease)     Cataract 2000    OD    Deviated septum     Erectile dysfunction     Hyperlipidemia     Hypertension     Obesity     Obstructive sleep apnea syndrome 2/26/2019    Needs work up   [facility] Retinal detachment 1991    OD    Type 2 diabetes mellitus without complication ([facility] [address]     Type II or unspecified type diabetes mellitus without mention of complication, not stated as uncontrolled     Unspecified sleep apnea      Current Outpatient Medications on File Prior to Visit   Medication Sig Not on file   Tobacco Use    Smoking status: Former Smoker     Packs/day: 0.10     Years: 1.00     Pack years: 0.10     Last attempt to quit:      Years since quittin.6    Smokeless tobacco: Never Used   Substance and Sexual Activity    Alcohol use: Yes     Comment: rarely    Drug use: No    Sexual activity: Not on file   Lifestyle    Physical activity     Days per week: Not on file     Minutes per session: Not on file    Stress: Not on file   Relationships    Social connections     Talks on phone: Not on file     Gets together: Not on file     Attends Buddhist service: Not on file     Active member of club or organization: Not on file     Attends meetings of clubs or organizations: Not on file     Relationship status: Not on file    Intimate partner violence     Fear of current or ex partner: Not on file     Emotionally abused: Not on file     Physically abused: Not on file     Forced sexual activity: Not on file   Other Topics Concern    Not on file   Social History Narrative    Not on file     Family History   Problem Relation Age of Onset    Cancer Father         liver    Diabetes Father     Stroke Mother     Alzheimer's Disease Mother     Diabetes Brother     Sleep Apnea Brother     Heart Attack Son     Sleep Apnea Brother        Physical Exam  Constitutional: Normal nutritional status  Mental Status: Alert and oriented  Skin: No rashes or erythema  Lymphatic: No lymphadenopathy    Hand Examination: Finkelstein's test today is negative.   Thumb ballottement and thumb grind test are just minimally positive    Additional Comments:     Additional Examinations:  X-Ray Findings:    Additional Diagnostic Test Findings:    Office Procedures:    Time Statement:I spent 15 minutes, face to face, and greater than 50% was spent counseling with the patient discussing treatment options and answering questions regarding the difference between de Quervain's tenosynovitis and CMC arthritis and the relationship between the 2. We discussed the operative procedures necessary to fix both of these pathologies and the recovery time for each of them independently or if we chose to do them together    This dictation was performed with a verbal recognition program. It is possible that there are still dictated errors within this office note. All efforts were made to ensure that this office note is accurate. No orders of the defined types were placed in this encounter. Attestation: I have reviewed the chief complaint and history of present illness (including ROS and PFSH) and vital documentation by my staff and I agree with their documentation and have added where applicable.

## 2020-09-15 RX ORDER — TAMSULOSIN HYDROCHLORIDE 0.4 MG/1
CAPSULE ORAL
Qty: 90 CAPSULE | Refills: 1 | Status: SHIPPED | OUTPATIENT
Start: 2020-09-15 | End: 2021-02-15 | Stop reason: SDUPTHER

## 2020-09-15 NOTE — TELEPHONE ENCOUNTER
Medication:   Requested Prescriptions     Pending Prescriptions Disp Refills    tamsulosin (FLOMAX) 0.4 MG capsule [Pharmacy Med Name: TAMSULOSIN HCL 0.4 MG CAPSULE] 30 capsule 1     Sig: TAKE ONE CAPSULE BY MOUTH DAILY        Last Filled: 4/10/20 #90, 0 RF      Patient Phone Number: [phone] (home)     Last appt: 7/22/2020 DM, HTN   Next appt: 10/22/2020

## 2020-10-22 ENCOUNTER — OFFICE VISIT (OUTPATIENT)
Dept: FAMILY MEDICINE CLINIC | Age: 75
End: 2020-10-22
Payer: MEDICARE

## 2020-10-22 VITALS
BODY MASS INDEX: 38.17 KG/M2 | DIASTOLIC BLOOD PRESSURE: 80 MMHG | HEART RATE: 79 BPM | SYSTOLIC BLOOD PRESSURE: 130 MMHG | TEMPERATURE: 96.8 F | OXYGEN SATURATION: 95 % | HEIGHT: 73 IN | WEIGHT: 288 LBS

## 2020-10-22 PROBLEM — E66.01 MORBIDLY OBESE (HCC): Status: ACTIVE | Noted: 2019-02-26

## 2020-10-22 LAB
CREATININE URINE POCT: 200
HBA1C MFR BLD: 7.1 %
MICROALBUMIN/CREAT 24H UR: 10 MG/G{CREAT}
MICROALBUMIN/CREAT UR-RTO: 30

## 2020-10-22 PROCEDURE — 83036 HEMOGLOBIN GLYCOSYLATED A1C: CPT | Performed by: FAMILY MEDICINE

## 2020-10-22 PROCEDURE — 3051F HG A1C>EQUAL 7.0%<8.0%: CPT | Performed by: FAMILY MEDICINE

## 2020-10-22 PROCEDURE — 82044 UR ALBUMIN SEMIQUANTITATIVE: CPT | Performed by: FAMILY MEDICINE

## 2020-10-22 PROCEDURE — 3017F COLORECTAL CA SCREEN DOC REV: CPT | Performed by: FAMILY MEDICINE

## 2020-10-22 PROCEDURE — 1123F ACP DISCUSS/DSCN MKR DOCD: CPT | Performed by: FAMILY MEDICINE

## 2020-10-22 PROCEDURE — G0439 PPPS, SUBSEQ VISIT: HCPCS | Performed by: FAMILY MEDICINE

## 2020-10-22 PROCEDURE — G8484 FLU IMMUNIZE NO ADMIN: HCPCS | Performed by: FAMILY MEDICINE

## 2020-10-22 PROCEDURE — 4040F PNEUMOC VAC/ADMIN/RCVD: CPT | Performed by: FAMILY MEDICINE

## 2020-10-22 RX ORDER — GLIMEPIRIDE 2 MG/1
2 TABLET ORAL 2 TIMES DAILY
Qty: 180 TABLET | Refills: 3 | Status: SHIPPED | OUTPATIENT
Start: 2020-10-22

## 2020-10-22 ASSESSMENT — LIFESTYLE VARIABLES
HOW OFTEN DURING THE LAST YEAR HAVE YOU BEEN UNABLE TO REMEMBER WHAT HAPPENED THE NIGHT BEFORE BECAUSE YOU HAD BEEN DRINKING: 0
HOW OFTEN DURING THE LAST YEAR HAVE YOU FOUND THAT YOU WERE NOT ABLE TO STOP DRINKING ONCE YOU HAD STARTED: 0
HOW OFTEN DO YOU HAVE SIX OR MORE DRINKS ON ONE OCCASION: 0
HOW OFTEN DURING THE LAST YEAR HAVE YOU NEEDED AN ALCOHOLIC DRINK FIRST THING IN THE MORNING TO GET YOURSELF GOING AFTER A NIGHT OF HEAVY DRINKING: 0
AUDIT TOTAL SCORE: 1
HOW MANY STANDARD DRINKS CONTAINING ALCOHOL DO YOU HAVE ON A TYPICAL DAY: 0
AUDIT-C TOTAL SCORE: 1
HOW OFTEN DURING THE LAST YEAR HAVE YOU FAILED TO DO WHAT WAS NORMALLY EXPECTED FROM YOU BECAUSE OF DRINKING: 0
HAVE YOU OR SOMEONE ELSE BEEN INJURED AS A RESULT OF YOUR DRINKING: 0
HOW OFTEN DO YOU HAVE A DRINK CONTAINING ALCOHOL: 1
HAS A RELATIVE, FRIEND, DOCTOR, OR ANOTHER HEALTH PROFESSIONAL EXPRESSED CONCERN ABOUT YOUR DRINKING OR SUGGESTED YOU CUT DOWN: 0
HOW OFTEN DURING THE LAST YEAR HAVE YOU HAD A FEELING OF GUILT OR REMORSE AFTER DRINKING: 0

## 2020-10-22 ASSESSMENT — PATIENT HEALTH QUESTIONNAIRE - PHQ9
1. LITTLE INTEREST OR PLEASURE IN DOING THINGS: 0
2. FEELING DOWN, DEPRESSED OR HOPELESS: 0
SUM OF ALL RESPONSES TO PHQ QUESTIONS 1-9: 0
SUM OF ALL RESPONSES TO PHQ9 QUESTIONS 1 & 2: 0
SUM OF ALL RESPONSES TO PHQ QUESTIONS 1-9: 0
SUM OF ALL RESPONSES TO PHQ QUESTIONS 1-9: 0

## 2020-10-22 NOTE — PATIENT INSTRUCTIONS
Personalized Preventive Plan for Daonckarina Almodovar - 10/22/2020  Medicare offers a range of preventive health benefits. Some of the tests and screenings are paid in full while other may be subject to a deductible, co-insurance, and/or copay. Some of these benefits include a comprehensive review of your medical history including lifestyle, illnesses that may run in your family, and various assessments and screenings as appropriate. After reviewing your medical record and screening and assessments performed today your provider may have ordered immunizations, labs, imaging, and/or referrals for you. A list of these orders (if applicable) as well as your Preventive Care list are included within your After Visit Summary for your review. Other Preventive Recommendations:    · A preventive eye exam performed by an eye specialist is recommended every 1-2 years to screen for glaucoma; cataracts, macular degeneration, and other eye disorders. · A preventive dental visit is recommended every 6 months. · Try to get at least 150 minutes of exercise per week or 10,000 steps per day on a pedometer . · Order or download the FREE \"Exercise & Physical Activity: Your Everyday Guide\" from [employer]. Call 2-333.497.4124 or search [employer] online. · You need 9966-0091 mg of calcium and 6918-5295 IU of vitamin D per day. It is possible to meet your calcium requirement with diet alone, but a vitamin D supplement is usually necessary to meet this goal.  · When exposed to the sun, use a sunscreen that protects against both UVA and UVB radiation with an SPF of 30 or greater. Reapply every 2 to 3 hours or after sweating, drying off with a towel, or swimming. · Always wear a seat belt when traveling in a car. Always wear a helmet when riding a bicycle or motorcycle.

## 2020-10-22 NOTE — PROGRESS NOTES
Medicare Annual Wellness Visit  Name: Lew Purchase Date: 10/22/2020   MRN: [id number] Sex: Male   Age: 70 y.o. Ethnicity: Non-/Non    : 1945 Race: Mansi Mccoy is here for Medicare AWV and Diabetes    Screenings for behavioral, psychosocial and functional/safety risks, and cognitive dysfunction are all negative except as indicated below. These results, as well as other patient data from the [address] form, are documented in Flowsheets linked to this Encounter. Allergies   Allergen Reactions    Ace Inhibitors      hyperkalemia       Prior to Visit Medications    Medication Sig Taking?  Authorizing Provider   tamsulosin (FLOMAX) 0.4 MG capsule TAKE ONE CAPSULE BY MOUTH DAILY Yes Kim Gasca MD   atorvastatin (LIPITOR) 40 MG tablet TAKE ONE TABLET BY MOUTH DAILY Yes DARVIN Tamayo CNP   metoprolol tartrate (LOPRESSOR) 25 MG tablet TAKE ONE-HALF TABLET BY MOUTH TWICE A DAY Yes Dylan Álvarez DO   Dulaglutide 1.5 MG/0.5ML SOPN Inject 1.5 mg into the skin once a week Yes Kim Gacsa MD   telmisartan (MICARDIS) 20 MG tablet Take 1 tablet by mouth daily Yes Kim Gasca MD   sildenafil (VIAGRA) 100 MG tablet TAKE ONE TABLET BY MOUTH AS NEEDED FOR ERECTILE DYSFUNCTION Yes Kim Gasca MD   metFORMIN (GLUCOPHAGE) 1000 MG tablet TAKE ONE TABLET BY MOUTH TWICE A DAY WITH MEALS Yes Kim Gasca MD   ONETOUCH VERIO strip USE ONE STRIP TO TEST DAILY AS NEEDED Yes Kim Gasca MD   glimepiride (AMARYL) 2 MG tablet Take 1 tablet by mouth 2 times daily Yes Kim Gasca MD   pioglitazone (ACTOS) 30 MG tablet Take 1 tablet by mouth daily Yes Kim Gasca MD   aspirin 325 MG EC tablet Take 1 tablet by mouth daily Yes Kim Gasca MD   glucose monitoring kit (FREESTYLE) monitoring kit 1 kit by Does not apply route daily as needed Yes Kim Gasca MD   glucose blood VI test strips (EXACTECH TEST) strip 1 each by In Vitro route 3 times daily As needed.  Yes Idell Nyhan, MD   Lancets MISC One Touch Verio IQ Pt test TID Yes Idell Nyhan, MD       Past Medical History:   Diagnosis Date    CAD (coronary artery disease)     Cataract 2000    OD    Deviated septum     Erectile dysfunction     Hyperlipidemia     Hypertension     Obesity     Obstructive sleep apnea syndrome 2/26/2019    Needs work up   [facility] Retinal detachment 1991    OD    Type 2 diabetes mellitus without complication (HCC)     Type II or unspecified type diabetes mellitus without mention of complication, not stated as uncontrolled     Unspecified sleep apnea        Past Surgical History:   Procedure Laterality Date    CARDIAC SURGERY      CATARACT REMOVAL WITH IMPLANT  2002    OD    CORONARY ARTERY BYPASS GRAFT  8/11/2015    Dr. Socorro Ayers - Urgent X3 (LIMA-LAD, SVG-OM2 & PDA)    LAP BAND  9/2008    RETINAL DETACHMENT SURGERY  1999    OD    SINUS SURGERY Left 11/27/2017    FESS       Family History   Problem Relation Age of Onset   [facility] Cancer Father         liver    Diabetes Father     Stroke Mother     Alzheimer's Disease Mother     Diabetes Brother     Sleep Apnea Brother     Heart Attack Son     Sleep Apnea Brother        CareTeam (Including outside providers/suppliers regularly involved in providing care):   Patient Care Team:  Idell Nyhan, MD as PCP - General (Family Medicine)  Idell Nyhan, MD as PCP - [address] [place] [address]  [facility]  Galilea Tinajero MD as Consulting Physician (Sleep Medicine)  DARVIN Alexandre CNP as Nurse Practitioner (Nurse Practitioner)  Dr. Nigel Vasquez - arnoldo ([facility])  CEI - opthal  Dentist - Kayleen Blunt -   Wt Readings from Last 3 Encounters:   10/22/20 288 lb (130.6 kg)   08/31/20 290 lb (131.5 kg)   08/03/20 290 lb (131.5 kg)     Vitals:    10/22/20 0826   BP: 130/80   Pulse: 79   Temp: 96.8 °F (36 °C)   SpO2: 95%   Weight: 288 lb (130.6 kg)   Height: 6' 0.99\" (1.854 m) Body mass index is 38.01 kg/m². Based upon direct observation of the patient, evaluation of cognition reveals recent and remote memory intact. Ros - mild smith, no chest pain  occ tingling in legs, very mild  General Appearance: alert and oriented to person, place and time, well-developed and well-nourished, in no acute distress  Eyes: pupils equal, round, and reactive to light, extraocular eye movements intact, conjunctivae normal  ENT: tympanic membrane, external ear and ear canal normal bilaterally, oropharynx clear and moist with normal mucous membranes  Neck: neck supple and non tender without mass, no thyromegaly or thyroid nodules, no cervical lymphadenopathy   Pulmonary/Chest: clear to auscultation bilaterally- no wheezes, rales or rhonchi, normal air movement, no respiratory distress  Cardiovascular: normal rate, normal S1 and S2, no gallops, intact distal pulses and no carotid bruits  Abdomen: soft, non-tender, non-distended, normal bowel sounds, no masses or organomegaly  Extremities: no cyanosis and no clubbing, 1 + piitting  edema    Patient's complete Health Risk Assessment and screening values have been reviewed and are found in Flowsheets. The following problems were reviewed today and where indicated follow up appointments were made and/or referrals ordered. Positive Risk Factor Screenings with Interventions:     General Health and ACP:  General  In general, how would you say your health is?: Good  In the past 7 days, have you experienced any of the following?  New or Increased Pain, New or Increased Fatigue, Loneliness, Social Isolation, Stress or Anger?: None of These  Do you get the social and emotional support that you need?: Yes  Do you have a Living Will?: (!) No  Advance Directives     Power of [address] Will ACP-Advance Directive ACP-Power of     Not on File Not on File Filed Not on File      General Health Risk Interventions:  · Pain issues: patient declines any further evaluation/treatment for this issue    Health Habits/Nutrition:  Health Habits/Nutrition  Do you exercise for at least 20 minutes 2-3 times per week?: (!) No  Have you lost any weight without trying in the past 3 months?: No  Do you eat fewer than 2 meals per day?: No  Have you seen a dentist within the past year?: Yes  Body mass index: (!) 38  Health Habits/Nutrition Interventions:  · Inadequate physical activity:  patient is not ready to increase his/her physical activity level at this time    Safety:  Safety  Do you have working smoke detectors?: Yes  Have all throw rugs been removed or fastened?: Yes  Do you have non-slip mats or surfaces in all bathtubs/showers?: (!) No  Do all of your stairways have a railing or banister?: Yes  Are your doorways, halls and stairs free of clutter?: Yes  Do you always fasten your seatbelt when you are in a car?: Yes  Safety Interventions:  · Home safety tips provided    Personalized Preventive Plan   Current Health Maintenance Status  Immunization History   Administered Date(s) Administered    Influenza Virus Vaccine 10/01/2012, 10/20/2013, 10/16/2014, 10/01/2019    Influenza, High Dose (Fluzone 65 yrs and older) 10/04/2018    Pneumococcal Conjugate 13-valent ([id number]) 03/16/2015    Pneumococcal Polysaccharide ([id number]) 10/10/2006, 04/03/2013    Tdap (Boostrix, Adacel) 03/15/2012    Zoster Live (Zostavax) 10/01/2012    Zoster Recombinant (Shingrix) 05/07/2019, 07/17/2019        Health Maintenance   Topic Date Due    Annual Wellness Visit (AWV)  05/29/2019    Diabetic foot exam  07/05/2019    Diabetic microalbuminuria test  07/05/2019    Lipid screen  05/15/2020    Flu vaccine (1) 09/01/2020    Potassium monitoring  01/29/2021    Creatinine monitoring  01/29/2021    A1C test (Diabetic or Prediabetic)  07/22/2021    Colon cancer screen colonoscopy  09/08/2021    Diabetic retinal exam  01/17/2022    DTaP/Tdap/Td vaccine (2 - Td) 03/15/2022    Shingles Vaccine  Completed    Pneumococcal 65+ years Vaccine  Completed    AAA screen  Completed    Hepatitis C screen  Completed    Hepatitis A vaccine  Aged Out    Hib vaccine  Aged Out    Meningococcal (ACWY) vaccine  Aged Out     Recommendations for [place] Due: see orders and patient instructions/AVS.  . Recommended screening schedule for the next 5-10 years is provided to the patient in written form: see Patient Instructions/AVS.    Martha Akins was seen today for medicare awv and diabetes. Diagnoses and all orders for this visit:    Routine general medical examination at a health care facility  a1c - 7.1  Encounter Diagnoses   Name Primary?     Routine general medical examination at a health care facility Yes    Non morbid obesity, unspecified obesity type     Morbidly obese ([address]     Mixed hyperlipidemia     Essential hypertension     Coronary artery disease involving native coronary artery of native heart without angina pectoris     Obstructive sleep apnea syndrome     Type 2 diabetes mellitus without complication, without long-term current use of insulin (HCC)     CMC arthritis    IMM UTD  - had flu shot at [facility] per pt    Exercise 150 min /week  Stop actos, follow sugars  rto 6 mo   Get records from pharmacy about recent flu shot  Urine microalbumin foot exam today

## 2020-10-26 RX ORDER — PIOGLITAZONEHYDROCHLORIDE 30 MG/1
TABLET ORAL
Qty: 90 TABLET | Refills: 1 | Status: SHIPPED | OUTPATIENT
Start: 2020-10-26 | End: 2020-11-30

## 2020-10-26 NOTE — TELEPHONE ENCOUNTER
Medication:   Requested Prescriptions     Pending Prescriptions Disp Refills    pioglitazone (ACTOS) 30 MG tablet [Pharmacy Med Name: PIOGLITAZONE HCL 30 MG TABLET] 90 tablet 2     Sig: TAKE ONE TABLET BY MOUTH DAILY    metFORMIN (GLUCOPHAGE) 1000 MG tablet [Pharmacy Med Name: metFORMIN HCL 1,000 MG TABLET] 180 tablet 1     Sig: TAKE ONE TABLET BY MOUTH TWICE A DAY WITH MEALS       Last Filled:  Actos 11/22/19 #90, 3 RF  Metformin 10/22/20 #180, 3 RF     Patient Phone Number: [phone] (home)     Last appt: 10/22/2020  AWV   Next appt: 4/22/2021    Last Labs DM:   Lab Results   Component Value Date    LABA1C 7.1 10/22/2020

## 2020-11-03 RX ORDER — PIOGLITAZONEHYDROCHLORIDE 30 MG/1
TABLET ORAL
Qty: 90 TABLET | Refills: 2 | OUTPATIENT
Start: 2020-11-03

## 2020-11-24 NOTE — PROGRESS NOTES
20    PATIENT: Nai Lino  : 1945    Primary Care Provider:   Tung Chavez MD  U:[phone]  H:[phone]    Reason for evaluation:   Chief Complaint   Patient presents with    Coronary Artery Disease     no cardiac ciomplaints at this time    1 Year Follow Up     History of present illness:   Mr. Nai Lino is a 70 y.o. male patient here today in annual cardiovascular follow up for history of CAD with CABG x 3 (LIMA-LAD, SV-OM2, SV-PDA in ). Waylon's rehab was interrupted for his left rotator cuff surgery in February by the Covid pandemic restrictions. He states he feels that he is greater than 90% recovered at this point. He has returned to golfing with a cart and states that he is most limited when walking by his low back. He describes occasional leg pain with twinges originating at lumbar. He achieves longer periods of exercise in his swimming pool, describing 10-20 laps per day throughout the summer. Denies chest pain/tightness, shortness of breath. Waylon's 2015 anginal presentation was described as an overwhelming fatigue. Patient is compliant with medications and is tolerating them well without adverse effects.   He states that his current focus is with his diabetic medications, working to find the right combination of efficacy and cost.    Medical History:      Diagnosis Date    Arthritis     CAD (coronary artery disease)     Cataract     OD    Deviated septum     Erectile dysfunction     Hyperlipidemia     Hypertension     Obesity     Obstructive sleep apnea syndrome 2019    Needs work up   [address] [place] [address] Retinal detachment     OD    Type 2 diabetes mellitus without complication ([facility] [address]     Type II or unspecified type diabetes mellitus without mention of complication, not stated as uncontrolled     Unspecified sleep apnea        Surgical History:      Procedure Laterality Date    CARDIAC SURGERY      CATARACT REMOVAL WITH IMPLANT      OD    CORONARY ARTERY BYPASS GRAFT  2015    Dr. Socorro Ayers - Urgent X3 (LIMA-LAD, SVG-OM2 & PDA)    LAP BAND  2008    RETINAL DETACHMENT SURGERY      OD    ROTATOR CUFF REPAIR      SINUS SURGERY Left 2017    FESS       Social History:  Social History     Socioeconomic History    Marital status:      Spouse name: Not on file    Number of children: Not on file    Years of education: Not on file    Highest education level: Not on file   Occupational History    Not on file   Social Needs    Financial resource strain: Not on file    Food insecurity     Worry: Not on file     Inability: Not on file   [employer] needs     Medical: Not on file     Non-medical: Not on file   Tobacco Use    Smoking status: Former Smoker     Packs/day: 0.10     Years: 1.00     Pack years: 0.10     Last attempt to quit:      Years since quittin.9    Smokeless tobacco: Never Used   Substance and Sexual Activity    Alcohol use: Yes     Comment: rarely    Drug use: No    Sexual activity: Not on file   Lifestyle    Physical activity     Days per week: Not on file     Minutes per session: Not on file    Stress: Not on file   Relationships    Social connections     Talks on phone: Not on file     Gets together: Not on file     Attends Caodaism service: Not on file     Active member of club or organization: Not on file     Attends meetings of clubs or organizations: Not on file     Relationship status: Not on file    Intimate partner violence     Fear of current or ex partner: Not on file     Emotionally abused: Not on file     Physically abused: Not on file     Forced sexual activity: Not on file   Other Topics Concern    Not on file   Social History Narrative    Not on file        Family History:  No evidence for sudden cardiac death or premature CAD.       Problem Relation Age of Onset    Cancer Father         liver    Diabetes Father     Stroke Mother     Alzheimer's Disease Mother     Diabetes Brother     Sleep Apnea Brother     Heart Attack Son     Sleep Apnea Brother        Medications:  [x] Medications and dosages reviewed. Prior to Admission medications    Medication Sig Start Date End Date Taking? Authorizing Provider   atorvastatin (LIPITOR) 40 MG tablet TAKE ONE TABLET BY MOUTH DAILY 12/1/20  Yes Dany Miller,    metoprolol tartrate (LOPRESSOR) 25 MG tablet TAKE ONE-HALF TABLET BY MOUTH TWICE A DAY 12/1/20  Yes Dany Miller DO   metFORMIN (GLUCOPHAGE) 1000 MG tablet TAKE ONE TABLET BY MOUTH TWICE A DAY WITH MEALS 10/26/20  Yes Cristofer Marina MD   glimepiride (AMARYL) 2 MG tablet Take 1 tablet by mouth 2 times daily 10/22/20  Yes Cristofer Marina MD   tamsulosin (FLOMAX) 0.4 MG capsule TAKE ONE CAPSULE BY MOUTH DAILY 9/15/20  Yes Cristofer Marina MD   Dulaglutide 1.5 MG/0.5ML SOPN Inject 1.5 mg into the skin once a week 8/7/20  Yes Cristofer Marina MD   telmisartan (MICARDIS) 20 MG tablet Take 1 tablet by mouth daily 5/1/20  Yes Cristofer Marina MD   sildenafil (VIAGRA) 100 MG tablet TAKE ONE TABLET BY MOUTH AS NEEDED FOR ERECTILE DYSFUNCTION 4/20/20  Yes Cristofer Marina MD   ONETOUCH VERIO strip USE ONE STRIP TO TEST DAILY AS NEEDED 11/25/19  Yes Cristofer Marina MD   aspirin 325 MG EC tablet Take 1 tablet by mouth daily 12/14/15  Yes Cristofer Marina MD   glucose monitoring kit (FREESTYLE) monitoring kit 1 kit by Does not apply route daily as needed 8/17/15  Yes Cristofer Marina MD   glucose blood VI test strips (EXACTECH TEST) strip 1 each by In Vitro route 3 times daily As needed.  8/17/15  Yes Cristofer Marina MD   Lancets MISC One Touch Verio IQ Pt test TID 8/17/15  Yes Cristofer Marina MD       Allergies:  Ace inhibitors     Review of Systems:    [x]Full ROS obtained and negative except as mentioned in HPI    Physical Examination:    /72 (Site: Right Upper Arm, Position: Sitting, Cuff Size: Large Adult)   Pulse 76 evidence of abdominal aortic aneurysm. Impression/Recommendations    Mr. Ileana Padron is a 70 y.o. male patient with:    Coronary artery disease (2015 CABG x 3 LIMA-LAD, SV-OM2, SV-PDA) (October 2018 SPECT MPI without ischemia)   Ischemic cardiomyopathy   Hyperlipidemia, stable: 5/5/19   LDL 81 HDL 41. Diabetes mellitus (A1C 10/2020 7.1)  Hypertension, controlled  (214/72 in office)  Obesity   Nonobstructive carotid artery disease  FRANCISCO  Chronic back pain  ACEI intolerance       [place] returned to daily swimming after rehabbing his left shoulder earlier this year. He offers no suggestion of angina or heart failure. He has tolerated his regimen of aspirin, moderate intensity statin, beta-blocker and ARB without concern. We primarily discussed exercise and diabetes goals today. Testing is up to date and he can continue with annual CV follow up. Return in about 1 year (around 12/1/2021). Patient Instructions   No medication changes  Call with questions or concerns  Let us know if you start to have concerns regarding leg discomfort and we can order ABIs (leg studies)  Follow up in 1 year  You can get updated cholesterol blood work at your follow up with Dr. Franko Gaytan       Thank you for allowing me to participate in the care of your patient. Please do not hesitate to call. Adriano Ortega D.O., [facility]  Interventional Cardiology     o: [phone]  [address], [address] [facility], [address] [place] [address]    NOTE:  This report was transcribed using voice recognition software. Every effort was made to ensure accuracy; however, inadvertent computerized transcription errors may be present. Scribe's Attestation: This note was scribed in the presence of Dr. Lewis Galo DO by Guillermo Naranjo, EDI.    I, Adriano Ortega, have personally performed the services described in this documentation as scribed by Agustina Rojas. EDI Rob in my presence, and it is both accurate and complete.  An electronic signature was used to authenticate this note.

## 2020-11-30 RX ORDER — PIOGLITAZONEHYDROCHLORIDE 30 MG/1
TABLET ORAL
Qty: 90 TABLET | Refills: 2 | Status: SHIPPED
Start: 2020-11-30 | End: 2020-12-01 | Stop reason: ALTCHOICE

## 2020-11-30 RX ORDER — ATORVASTATIN CALCIUM 40 MG/1
TABLET, FILM COATED ORAL
Qty: 90 TABLET | Refills: 0 | Status: SHIPPED | OUTPATIENT
Start: 2020-11-30 | End: 2020-12-01 | Stop reason: SDUPTHER

## 2020-11-30 NOTE — TELEPHONE ENCOUNTER
Medication:   Requested Prescriptions     Pending Prescriptions Disp Refills    pioglitazone (ACTOS) 30 MG tablet [Pharmacy Med Name: PIOGLITAZONE HCL 30 MG TABLET] 90 tablet 2     Sig: TAKE ONE TABLET BY MOUTH DAILY        Last Filled:      Patient Phone Number: [phone] (home)     Last appt: 10/22/2020   Next appt: 4/22/2021    Last OARRS: No flowsheet data found.

## 2020-12-01 ENCOUNTER — OFFICE VISIT (OUTPATIENT)
Dept: CARDIOLOGY CLINIC | Age: 75
End: 2020-12-01
Payer: MEDICARE

## 2020-12-01 VITALS
SYSTOLIC BLOOD PRESSURE: 124 MMHG | WEIGHT: 285.6 LBS | HEIGHT: 73 IN | BODY MASS INDEX: 37.85 KG/M2 | HEART RATE: 76 BPM | DIASTOLIC BLOOD PRESSURE: 72 MMHG | OXYGEN SATURATION: 97 %

## 2020-12-01 PROCEDURE — 4040F PNEUMOC VAC/ADMIN/RCVD: CPT | Performed by: INTERNAL MEDICINE

## 2020-12-01 PROCEDURE — G8427 DOCREV CUR MEDS BY ELIG CLIN: HCPCS | Performed by: INTERNAL MEDICINE

## 2020-12-01 PROCEDURE — 3017F COLORECTAL CA SCREEN DOC REV: CPT | Performed by: INTERNAL MEDICINE

## 2020-12-01 PROCEDURE — 1123F ACP DISCUSS/DSCN MKR DOCD: CPT | Performed by: INTERNAL MEDICINE

## 2020-12-01 PROCEDURE — G8484 FLU IMMUNIZE NO ADMIN: HCPCS | Performed by: INTERNAL MEDICINE

## 2020-12-01 PROCEDURE — G8417 CALC BMI ABV UP PARAM F/U: HCPCS | Performed by: INTERNAL MEDICINE

## 2020-12-01 PROCEDURE — 1036F TOBACCO NON-USER: CPT | Performed by: INTERNAL MEDICINE

## 2020-12-01 PROCEDURE — 99214 OFFICE O/P EST MOD 30 MIN: CPT | Performed by: INTERNAL MEDICINE

## 2020-12-01 RX ORDER — ATORVASTATIN CALCIUM 40 MG/1
TABLET, FILM COATED ORAL
Qty: 90 TABLET | Refills: 3 | Status: SHIPPED | OUTPATIENT
Start: 2020-12-01

## 2020-12-01 RX ORDER — PIOGLITAZONEHYDROCHLORIDE 30 MG/1
TABLET ORAL
Qty: 90 TABLET | Refills: 2 | OUTPATIENT
Start: 2020-12-01

## 2020-12-01 NOTE — LETTER
 Obstructive sleep apnea syndrome 2019    Needs work up   Flaco Rudder Retinal detachment     OD    Type 2 diabetes mellitus without complication (HCC)     Type II or unspecified type diabetes mellitus without mention of complication, not stated as uncontrolled     Unspecified sleep apnea        Surgical History:      Procedure Laterality Date    CARDIAC SURGERY      CATARACT REMOVAL WITH IMPLANT      OD    CORONARY ARTERY BYPASS GRAFT  2015    Dr. Vieira Fartun - Urgent X3 (LIMA-LAD, SVG-OM2 & PDA)    LAP BAND  2008    RETINAL DETACHMENT SURGERY      OD    ROTATOR CUFF REPAIR      SINUS SURGERY Left 2017    FESS       Social History:  Social History     Socioeconomic History    Marital status:      Spouse name: Not on file    Number of children: Not on file    Years of education: Not on file    Highest education level: Not on file   Occupational History    Not on file   Social Needs    Financial resource strain: Not on file    Food insecurity     Worry: Not on file     Inability: Not on file   [employer] needs     Medical: Not on file     Non-medical: Not on file   Tobacco Use    Smoking status: Former Smoker     Packs/day: 0.10     Years: 1.00     Pack years: 0.10     Last attempt to quit: 1978     Years since quittin.9    Smokeless tobacco: Never Used   Substance and Sexual Activity    Alcohol use: Yes     Comment: rarely    Drug use: No    Sexual activity: Not on file   Lifestyle    Physical activity     Days per week: Not on file     Minutes per session: Not on file    Stress: Not on file   Relationships    Social connections     Talks on phone: Not on file     Gets together: Not on file     Attends Sabianist service: Not on file     Active member of club or organization: Not on file     Attends meetings of clubs or organizations: Not on file     Relationship status: Not on file    Intimate partner violence     Fear of current or ex partner: Not on file Emotionally abused: Not on file     Physically abused: Not on file     Forced sexual activity: Not on file   Other Topics Concern    Not on file   Social History Narrative    Not on file        Family History:  No evidence for sudden cardiac death or premature CAD. Problem Relation Age of Onset    Cancer Father         liver    Diabetes Father     Stroke Mother     Alzheimer's Disease Mother     Diabetes Brother     Sleep Apnea Brother     Heart Attack Son     Sleep Apnea Brother        Medications:  [x] Medications and dosages reviewed. Prior to Admission medications    Medication Sig Start Date End Date Taking?  Authorizing Provider   atorvastatin (LIPITOR) 40 MG tablet TAKE ONE TABLET BY MOUTH DAILY 12/1/20  Yes Jacinto Maldonado, DO   metoprolol tartrate (LOPRESSOR) 25 MG tablet TAKE ONE-HALF TABLET BY MOUTH TWICE A DAY 12/1/20  Yes Jacinto Maldonado, DO   metFORMIN (GLUCOPHAGE) 1000 MG tablet TAKE ONE TABLET BY MOUTH TWICE A DAY WITH MEALS 10/26/20  Yes Errol Lord MD   glimepiride (AMARYL) 2 MG tablet Take 1 tablet by mouth 2 times daily 10/22/20  Yes Errol Lord MD   tamsulosin (FLOMAX) 0.4 MG capsule TAKE ONE CAPSULE BY MOUTH DAILY 9/15/20  Yes Errol Lord MD   Dulaglutide 1.5 MG/0.5ML SOPN Inject 1.5 mg into the skin once a week 8/7/20  Yes Errol Lord MD   telmisartan (MICARDIS) 20 MG tablet Take 1 tablet by mouth daily 5/1/20  Yes Errol Lord MD   sildenafil (VIAGRA) 100 MG tablet TAKE ONE TABLET BY MOUTH AS NEEDED FOR ERECTILE DYSFUNCTION 4/20/20  Yes Errol Lord MD   ONETOUCH VERIO strip USE ONE STRIP TO TEST DAILY AS NEEDED 11/25/19  Yes Errol Lord MD   aspirin 325 MG EC tablet Take 1 tablet by mouth daily 12/14/15  Yes Errol Lord MD   glucose monitoring kit (FREESTYLE) monitoring kit 1 kit by Does not apply route daily as needed 8/17/15  Yes Errol Lord MD glucose blood VI test strips (EXACTECH TEST) strip 1 each by In Vitro route 3 times daily As needed. 8/17/15  Yes Ayah Peters MD   Lancets MISC One Touch Verio IQ Pt test TID 8/17/15  Yes Ayah Peters MD       Allergies:  Ace inhibitors     Review of Systems:    [x]Full ROS obtained and negative except as mentioned in HPI    Physical Examination:    /72 (Site: Right Upper Arm, Position: Sitting, Cuff Size: Large Adult)   Pulse 76   Ht 6' 1\" (1.854 m)   Wt 285 lb 9.6 oz (129.5 kg)   SpO2 97%   BMI 37.68 kg/m²   Wt Readings from Last 3 Encounters:   12/01/20 285 lb 9.6 oz (129.5 kg)   10/22/20 288 lb (130.6 kg)   08/31/20 290 lb (131.5 kg)     Vitals:    12/01/20 1113   BP: 124/72   Pulse: 76   SpO2: 97%       · GENERAL: Well developed, well nourished, no acute distress  · NEUROLOGICAL: Alert and oriented x3  · PSYCH: Normal mood and affect   · SKIN: Warm and dry  · HEENT: Normocephalic, atraumatic, Sclera non-icteric, mucous membranes moist  · NECK: supple, JVP normal  · CARDIAC: Normal PMI, regular rate and rhythm, normal S1S2, no murmur, rub, or gallop  · RESPIRATORY: Normal respiratory effort, clear to auscultation bilaterally  · EXTREMITIES: no edema or clubbing, +2 pulses bilaterally   · MUSCULOSKELETAL: No joint swelling or tenderness, no chest wall tenderness  · GASTROINTESTINAL: normal bowel sounds, soft, non-tender    Imaging:  I have reviewed the below testing personally:  Echo 11/16/18   Summary   -DEFINITY was used to better visualize left ventricle.   -Normal left ventricle size and wall thickness. Ejection fraction is   visually estimated to be 40-45%. Global hypokinesis more marked at the apex.   -Mild mitral regurgitation.   -Mild tricuspid regurgitation with RVSP of 30 mmHg. SPECT 10/29/18  Summary    Normal LV size and systolic function. Left ventricular ejection fraction of 59 %.     There is a large sized, moderate intensity, completely fixed defect NOTE:  This report was transcribed using voice recognition software. Every effort was made to ensure accuracy; however, inadvertent computerized transcription errors may be present. Scribe's Attestation: This note was scribed in the presence of Dr. Jovani Fernandez DO by Shelly Granados RN.    I, Belle Siemens, have personally performed the services described in this documentation as scribed by Deepika Rob RN in my presence, and it is both accurate and complete. An electronic signature was used to authenticate this note.              Sincerely,        Gonzalez Hansen DO

## 2020-12-14 ENCOUNTER — OFFICE VISIT (OUTPATIENT)
Dept: ORTHOPEDIC SURGERY | Age: 75
End: 2020-12-14
Payer: MEDICARE

## 2020-12-14 PROCEDURE — 99213 OFFICE O/P EST LOW 20 MIN: CPT | Performed by: ORTHOPAEDIC SURGERY

## 2020-12-14 PROCEDURE — 3017F COLORECTAL CA SCREEN DOC REV: CPT | Performed by: ORTHOPAEDIC SURGERY

## 2020-12-14 PROCEDURE — 1123F ACP DISCUSS/DSCN MKR DOCD: CPT | Performed by: ORTHOPAEDIC SURGERY

## 2020-12-14 PROCEDURE — G8484 FLU IMMUNIZE NO ADMIN: HCPCS | Performed by: ORTHOPAEDIC SURGERY

## 2020-12-14 PROCEDURE — G8417 CALC BMI ABV UP PARAM F/U: HCPCS | Performed by: ORTHOPAEDIC SURGERY

## 2020-12-14 PROCEDURE — 20600 DRAIN/INJ JOINT/BURSA W/O US: CPT | Performed by: ORTHOPAEDIC SURGERY

## 2020-12-14 PROCEDURE — 1036F TOBACCO NON-USER: CPT | Performed by: ORTHOPAEDIC SURGERY

## 2020-12-14 PROCEDURE — G8427 DOCREV CUR MEDS BY ELIG CLIN: HCPCS | Performed by: ORTHOPAEDIC SURGERY

## 2020-12-14 PROCEDURE — 4040F PNEUMOC VAC/ADMIN/RCVD: CPT | Performed by: ORTHOPAEDIC SURGERY

## 2020-12-14 RX ORDER — BETAMETHASONE SODIUM PHOSPHATE AND BETAMETHASONE ACETATE 3; 3 MG/ML; MG/ML
6 INJECTION, SUSPENSION INTRA-ARTICULAR; INTRALESIONAL; INTRAMUSCULAR; SOFT TISSUE ONCE
Status: COMPLETED | OUTPATIENT
Start: 2020-12-14 | End: 2020-12-14

## 2020-12-14 RX ADMIN — BETAMETHASONE SODIUM PHOSPHATE AND BETAMETHASONE ACETATE 6 MG: 3; 3 INJECTION, SUSPENSION INTRA-ARTICULAR; INTRALESIONAL; INTRAMUSCULAR; SOFT TISSUE at 14:36

## 2020-12-14 NOTE — PROGRESS NOTES
Assessment: Right first CMC joint osteoarthritis and we gave him a second injection into the first [facility] joint today. We had given him 1 injection in the first dorsal extensor compartment initially and this pretty much resolved his de Quervain's type symptoms. Treatment Plan: I think eventually he may require CMC arthroplasty either with or without release of the first dorsal compartment but he is done very well with the conservative treatment thus far. I did discuss with him that I am going to retire the end of January so if he were to require a surgical intervention we would get him to see one of my hand surgical partners    Return if symptoms worsen or fail to improve. History of Present Illness  Diana Almodovar is a 70 y.o. male. Location:  Right wrist and thumb Severity: mild to moderate pain Duration: several months  Modifying factors: he is starting to have pain again in his right wrist and thumb area, requesting another cortisone injection. Associated symptoms: none    Review of Systems  Pertinent items are noted in HPI  Denies fever chills, confusion and bowel and bladder active change  Complete Review of Systems reviewed from patient history form dated 9/19/19 and available in the patients chart under the media tab. Vital Signs  There were no vitals filed for this visit. There is no height or weight on file to calculate BMI.      Contributory History      Medical History  Past Medical History:   Diagnosis Date    Arthritis     CAD (coronary artery disease)     Cataract 2000    OD    Deviated septum     Erectile dysfunction     Hyperlipidemia     Hypertension     Obesity     Obstructive sleep apnea syndrome 2/26/2019    Needs work up   Ledy Stabs Retinal detachment 1991    OD    Type 2 diabetes mellitus without complication ([facility] [address]     Type II or unspecified type diabetes mellitus without mention of complication, not stated as uncontrolled     Unspecified sleep apnea      Current Outpatient Medications on File Prior to Visit   Medication Sig Dispense Refill    sildenafil (VIAGRA) 100 MG tablet TAKE ONE TABLET BY MOUTH  AS NEEDED FOR ERECTILE DYSFUNCTION 10 tablet 3    atorvastatin (LIPITOR) 40 MG tablet TAKE ONE TABLET BY MOUTH DAILY 90 tablet 3    metoprolol tartrate (LOPRESSOR) 25 MG tablet TAKE ONE-HALF TABLET BY MOUTH TWICE A DAY 90 tablet 3    metFORMIN (GLUCOPHAGE) 1000 MG tablet TAKE ONE TABLET BY MOUTH TWICE A DAY WITH MEALS 180 tablet 1    glimepiride (AMARYL) 2 MG tablet Take 1 tablet by mouth 2 times daily 180 tablet 3    tamsulosin (FLOMAX) 0.4 MG capsule TAKE ONE CAPSULE BY MOUTH DAILY 90 capsule 1    Dulaglutide 1.5 MG/0.5ML SOPN Inject 1.5 mg into the skin once a week 4 pen 5    telmisartan (MICARDIS) 20 MG tablet Take 1 tablet by mouth daily 90 tablet 3    ONETOUCH VERIO strip USE ONE STRIP TO TEST DAILY AS NEEDED 100 strip 3    aspirin 325 MG EC tablet Take 1 tablet by mouth daily 30 tablet 3    glucose monitoring kit (FREESTYLE) monitoring kit 1 kit by Does not apply route daily as needed 1 kit 0    glucose blood VI test strips (EXACTECH TEST) strip 1 each by In Vitro route 3 times daily As needed. 100 each 3    Lancets MISC One Touch Verio IQ Pt test  each 3     No current facility-administered medications on file prior to visit.       Allergies   Allergen Reactions    Ace Inhibitors      hyperkalemia     Social History     Socioeconomic History    Marital status:      Spouse name: Not on file    Number of children: Not on file    Years of education: Not on file    Highest education level: Not on file   Occupational History    Not on file   Social Needs    Financial resource strain: Not on file    Food insecurity     Worry: Not on file     Inability: Not on file    Transportation needs     Medical: Not on file     Non-medical: Not on file   Tobacco Use    Smoking status: Former Smoker     Packs/day: 0.10     Years: 1.00     Pack years: 0.10     Quit date: 1     Years since quittin.11    Smokeless tobacco: Never Used   Substance and Sexual Activity    Alcohol use: Yes     Comment: rarely    Drug use: No    Sexual activity: Not on file   Lifestyle    Physical activity     Days per week: Not on file     Minutes per session: Not on file    Stress: Not on file   Relationships    Social connections     Talks on phone: Not on file     Gets together: Not on file     Attends Congregation service: Not on file     Active member of club or organization: Not on file     Attends meetings of clubs or organizations: Not on file     Relationship status: Not on file    Intimate partner violence     Fear of current or ex partner: Not on file     Emotionally abused: Not on file     Physically abused: Not on file     Forced sexual activity: Not on file   Other Topics Concern    Not on file   Social History Narrative    Not on file     Family History   Problem Relation Age of Onset    Cancer Father         liver    Diabetes Father     Stroke Mother     Alzheimer's Disease Mother     Diabetes Brother     Sleep Apnea Brother     Heart Attack Son     Sleep Apnea Brother        Physical Exam  Constitutional: Normal nutritional status  Mental Status: Alert and oriented  Skin: No rashes or erythema  Lymphatic: No lymphadenopathy    Hand Examination: Examination of the right hand does show positive thumb ballottement thumb grind test and I think this is the location of his pain today. Finkelstein's test really does not produce any pain or swelling along the first dorsal extensor compartment. Vascular exam normal capillary refill. Neurologic exam negative Tinel's and Phalen's    Additional Comments:     Additional Examinations:  X-Ray Findings:    Additional Diagnostic Test Findings:    Office Procedures: After discussing the procedure with the patient I injected the right first [facility] joint.   We discussed risks and benefits of the injection and then sterilely prepped the carpometacarpal joint with alcohol. We then injected 1 mL betamethasone and 1/2 mL Xylocaine under sterile technique. Patient tolerated the injection well    Time Statement: This dictation was performed with a verbal recognition program. It is possible that there are still dictated errors within this office note. All efforts were made to ensure that this office note is accurate. Orders Placed This Encounter   Procedures    LA ARTHROCENTESIS ASPIR&/INJ SMALL JT/BURSA W/O US       Attestation: I have reviewed the chief complaint and history of present illness (including ROS and PFSH) and vital documentation by my staff and I agree with their documentation and have added where applicable.

## 2021-01-05 ENCOUNTER — PATIENT MESSAGE (OUTPATIENT)
Dept: FAMILY MEDICINE CLINIC | Age: 76
End: 2021-01-05

## 2021-01-05 RX ORDER — PRAMOXINE HYDROCHLORIDE HYDROCORTISONE ACETATE 100; 100 MG/10G; MG/10G
AEROSOL, FOAM TOPICAL
Qty: 1 CAN | Refills: 2 | Status: SHIPPED | OUTPATIENT
Start: 2021-01-05 | End: 2022-03-07

## 2021-01-05 RX ORDER — BLOOD SUGAR DIAGNOSTIC
STRIP MISCELLANEOUS
Qty: 50 STRIP | Refills: 2 | Status: SHIPPED | OUTPATIENT
Start: 2021-01-05

## 2021-01-05 NOTE — TELEPHONE ENCOUNTER
From: Lana Mcgovern  To: Terrance Marquis MD  Sent: 1/5/2021 2:10 PM EST  Subject: Prescription Question    Dr. Med Parry,  [address]! About every 4-5 years I get a RX from you for Proctozone-HC 2.5% Cream for my butt. Last RX was 9/2/15. I'm in need of a refill if possible as my next appointment (diabetic check) isn't until May. (I don't over use it as I have the original tube.)  Thanks in advance for your time and consideration.   LM M[place]

## 2021-01-06 ENCOUNTER — TELEPHONE (OUTPATIENT)
Dept: FAMILY MEDICINE CLINIC | Age: 76
End: 2021-01-06

## 2021-01-06 NOTE — TELEPHONE ENCOUNTER
Pharmacist states she wants to know  If you will switch Proctofoam to hydrocortisone cream 2%. She said it is 100 dollar difference.

## 2021-01-20 RX ORDER — LANCETS 30 GAUGE
EACH MISCELLANEOUS
Qty: 100 EACH | Refills: 3 | Status: SHIPPED | OUTPATIENT
Start: 2021-01-20

## 2021-01-20 NOTE — TELEPHONE ENCOUNTER
Medication:   Requested Prescriptions     Pending Prescriptions Disp Refills    Lancets MISC 100 each 3     Sig: One Touch Verio IQ Pt test TID       Last Filled:  8/17/15 #100, 3 RF     Patient Phone Number: [phone] (home)     Last appt: 10/22/20 AWV, DM   Next appt: Visit date not found    Last Labs DM:   Lab Results   Component Value Date    LABA1C 7.1 10/22/2020

## 2021-02-15 RX ORDER — TAMSULOSIN HYDROCHLORIDE 0.4 MG/1
CAPSULE ORAL
Qty: 90 CAPSULE | Refills: 2 | Status: SHIPPED | OUTPATIENT
Start: 2021-02-15

## 2021-02-15 RX ORDER — TELMISARTAN 20 MG/1
20 TABLET ORAL DAILY
Qty: 90 TABLET | Refills: 2 | Status: SHIPPED | OUTPATIENT
Start: 2021-02-15 | End: 2021-12-06

## 2021-02-15 NOTE — TELEPHONE ENCOUNTER
Medication:   Requested Prescriptions     Pending Prescriptions Disp Refills    telmisartan (MICARDIS) 20 MG tablet 90 tablet 3     Sig: Take 1 tablet by mouth daily    tamsulosin (FLOMAX) 0.4 MG capsule 90 capsule 1     Sig: TAKE ONE CAPSULE BY MOUTH DAILY        Last Filled:  flomax 9/15/20 #90, 1 RF  telmisartan 5/1/20 #90, 3 RF     Patient Phone Number: [phone] (home)     Last appt: 10/22/2020 AWV  Next appt: 4/14/2021    Lab Results   Component Value Date     01/29/2020    K 4.9 01/29/2020     01/29/2020    CO2 25 01/29/2020    BUN 14 01/29/2020    CREATININE 1.0 01/29/2020    GLUCOSE 89 01/29/2020    CALCIUM 9.6 01/29/2020    PROT 7.2 01/29/2020    LABALBU 4.5 01/29/2020    BILITOT 0.6 01/29/2020    ALKPHOS 47 01/29/2020    AST 20 01/29/2020    ALT 23 01/29/2020    LABGLOM >60 01/29/2020    GFRAA >60 01/29/2020    AGRATIO 1.7 01/29/2020    GLOB 2.7 01/29/2020

## 2021-03-10 ENCOUNTER — TELEPHONE (OUTPATIENT)
Dept: CARDIOLOGY CLINIC | Age: 76
End: 2021-03-10

## 2021-03-10 DIAGNOSIS — M79.604 PAIN OF RIGHT LOWER EXTREMITY: ICD-10-CM

## 2021-03-10 DIAGNOSIS — R09.89 SUSPECTED DEEP VEIN THROMBOSIS (DVT): ICD-10-CM

## 2021-03-10 DIAGNOSIS — I73.9 BILATERAL CLAUDICATION OF LOWER LIMB (HCC): Primary | ICD-10-CM

## 2021-03-10 NOTE — TELEPHONE ENCOUNTER
Per [facility] will schedule venous doppler to r/o DVT and also ABIs. Scheduled. Waylon is in agreement. Informed him of testing times.

## 2021-03-10 NOTE — TELEPHONE ENCOUNTER
Pt calling when he was in to see [facility] 12/01/2020 he mentioned having leg pain and BNN told him to call back if it occurs again. Well for the last 6 wks or so his lower right leg on the right side of leg he has a lot of pain and it's worse at night. Pt states it's not muscular. Pt wants to make sure that it's not a clot or anything. Needs to know what to do? Pls call to advise Thank you      [facility] 12/01/2020 Plan:    Return in about 1 year (around 12/1/2021).   Patient Instructions   No medication changes  Call with questions or concerns    Let us know if you start to have concerns regarding leg discomfort and we can order ABIs (leg studies)    Follow up in 1 year  You can get updated cholesterol blood work at your follow up with Dr. Med Parry

## 2021-03-10 NOTE — TELEPHONE ENCOUNTER
Spoke with Waylon, he reports that the lower part of his right calf is extremely uncomfortable and he describes the pain as \"dull and a fatigued type of ache. \" elevating his leg helps somewhat. Denies bruising, bump, abrasion, swelling, color/temperature changes. No numbness/tingling. Certain positions help. Walking doesn't necessarily make it worse, but sitting down pain is more intense. Reports it is almost always in the evenings and sometimes starts in the afternoons. This has been ongoing for 6 weeks. He doesn't feel like its muscular. Will discuss with BNN.

## 2021-03-11 ENCOUNTER — HOSPITAL ENCOUNTER (OUTPATIENT)
Dept: VASCULAR LAB | Age: 76
Discharge: HOME | End: 2021-03-11
Payer: MEDICARE

## 2021-03-11 DIAGNOSIS — R09.89 SUSPECTED DEEP VEIN THROMBOSIS (DVT): ICD-10-CM

## 2021-03-11 DIAGNOSIS — M79.604 PAIN OF RIGHT LOWER EXTREMITY: ICD-10-CM

## 2021-03-11 PROCEDURE — 93971 EXTREMITY STUDY: CPT

## 2021-03-26 ENCOUNTER — HOSPITAL ENCOUNTER (OUTPATIENT)
Dept: VASCULAR LAB | Age: 76
Discharge: HOME | End: 2021-03-26
Payer: MEDICARE

## 2021-03-26 DIAGNOSIS — I73.9 BILATERAL CLAUDICATION OF LOWER LIMB (HCC): ICD-10-CM

## 2021-03-26 PROCEDURE — 93922 UPR/L XTREMITY ART 2 LEVELS: CPT

## 2021-03-26 NOTE — RESULT ENCOUNTER NOTE
Called and gave results of ABIs to Skip. He plans to get nerve studies ordered by podiatry to investigate leg symptoms further. BP medicine has been managed by the [facility] recently and he is pleased with the care.

## 2021-05-17 ENCOUNTER — VIRTUAL VISIT (OUTPATIENT)
Dept: PULMONOLOGY | Age: 76
End: 2021-05-17
Payer: MEDICARE

## 2021-05-17 DIAGNOSIS — G47.33 OBSTRUCTIVE SLEEP APNEA SYNDROME: Primary | Chronic | ICD-10-CM

## 2021-05-17 DIAGNOSIS — E66.01 MORBIDLY OBESE (HCC): ICD-10-CM

## 2021-05-17 DIAGNOSIS — I10 ESSENTIAL HYPERTENSION: Chronic | ICD-10-CM

## 2021-05-17 DIAGNOSIS — E11.9 TYPE 2 DIABETES MELLITUS WITHOUT COMPLICATION, WITHOUT LONG-TERM CURRENT USE OF INSULIN (HCC): Chronic | ICD-10-CM

## 2021-05-17 PROCEDURE — G8427 DOCREV CUR MEDS BY ELIG CLIN: HCPCS | Performed by: NURSE PRACTITIONER

## 2021-05-17 PROCEDURE — 4040F PNEUMOC VAC/ADMIN/RCVD: CPT | Performed by: NURSE PRACTITIONER

## 2021-05-17 PROCEDURE — 3046F HEMOGLOBIN A1C LEVEL >9.0%: CPT | Performed by: NURSE PRACTITIONER

## 2021-05-17 PROCEDURE — 99214 OFFICE O/P EST MOD 30 MIN: CPT | Performed by: NURSE PRACTITIONER

## 2021-05-17 PROCEDURE — 2022F DILAT RTA XM EVC RTNOPTHY: CPT | Performed by: NURSE PRACTITIONER

## 2021-05-17 PROCEDURE — 1123F ACP DISCUSS/DSCN MKR DOCD: CPT | Performed by: NURSE PRACTITIONER

## 2021-05-17 PROCEDURE — 3017F COLORECTAL CA SCREEN DOC REV: CPT | Performed by: NURSE PRACTITIONER

## 2021-05-17 ASSESSMENT — SLEEP AND FATIGUE QUESTIONNAIRES
HOW LIKELY ARE YOU TO NOD OFF OR FALL ASLEEP WHEN YOU ARE A PASSENGER IN A CAR FOR AN HOUR WITHOUT A BREAK: 0
ESS TOTAL SCORE: 0
HOW LIKELY ARE YOU TO NOD OFF OR FALL ASLEEP WHILE SITTING AND TALKING TO SOMEONE: 0
HOW LIKELY ARE YOU TO NOD OFF OR FALL ASLEEP WHILE SITTING QUIETLY AFTER LUNCH WITHOUT ALCOHOL: 0
HOW LIKELY ARE YOU TO NOD OFF OR FALL ASLEEP IN A CAR, WHILE STOPPED FOR A FEW MINUTES IN TRAFFIC: 0
HOW LIKELY ARE YOU TO NOD OFF OR FALL ASLEEP WHILE SITTING INACTIVE IN A PUBLIC PLACE: 0
HOW LIKELY ARE YOU TO NOD OFF OR FALL ASLEEP WHILE LYING DOWN TO REST IN THE AFTERNOON WHEN CIRCUMSTANCES PERMIT: 0

## 2021-05-17 NOTE — PROGRESS NOTES
Natalia Diaz MD, FAASM, [facility]  Macey Naranjo, MSN, RN, [id number] [address] [address]  Dept: [phone]  Dept Fax: [phone]  Loc: [phone]    Subjective:     Patient ID: Jenn Alston is a 70 y.o. male. Chief Complaint   Patient presents with    Sleep Apnea       HPI:      Sleep Medicine Video Visit    Pursuant to the emergency declaration under the [address] [place] [address] waiver authority and the [employer] Act this Telephone Visit was insisted, with patient's consent, to reduce the patient's risk of exposure to COVID-19 and provide continuity of care for an established patient. Services were provided through a synchronous discussion over a telephone and/or Video chat to substitute for in-person clinic visit, and coded as such. While patient is at home. Machine Modem/Download Info:  Compliance (hours/night): 7 hrs/night  Download AHI (/hour): 2.9 /HR  Average CPAP Pressure : 13.7 cmH2O           APAP - Settings  Pressure Min: 12 cmH2O  Pressure Max: 20 cmH2O                 Comfort Settings  Humidity Level (0-8): 3  Flex/EPR (0-3): 3 PAP Mask  Mask Type: Nasal pillows  Clinically Relevant Leak: No     [place] - Total score: 0    Follow-up :     Last Visit : May 2020      Subjective Health Changes: Weight loss 35#'s.  Back pain and DM med changes      Over Night Oximetry: [] Yes  [] No  [x] NA [] WNL   Using O2: [] Yes  [] No  [x] NA   Patient is compliant with the machine  [x] Yes  [] No [] Per patient   Feeling rested when using the machine   [x] Yes  [] No     Pressure is comfortable with inspiration and expiration  [x] Yes  [] No   ([x] NA   [] Feeling of suffocation  [] Feeling like not enough air    [] To much pressure)     Noticed changes in pressure  [x] NA  [] Yes    [] No     Mask is fitting well  [x] Yes  [] No Noting Mask Air Leak  [] Yes  [x] No   Having painful Aerophagia  [] Yes  [x] No   Nocturia   2  per night. Having  HA upon waking  [] Yes  [x] No   Dry mouth upon waking   Dry Nose  Dry Eyes  [] Yes  [x] No   Congestion upon waking   [] Yes  [x] No    Nose Bleeds  [] Yes  [x] No   Using Sleep Aides  [x] NA  [] OTC  [] Per our office   [] Per another provider   Understands how to change humidification and/or tubing temperature for comfort while at home  [x] Yes  [] No     Difficulties falling asleep  [] Yes  [x] No   Difficulties staying asleep  [] Yes  [x] No   Approximate time to bed  12am   Approximate wake time  8-8:30am   Taking Naps  no   If taking naps usual length  [x] NA   If taking naps using the machine [x] NA  [] Yes    [] No    [] With and With out    Drowsy when driving  [] Yes  [x] No     Does patient carry a DOT/CDL  [] Yes  [x] No     Does patient carry FAA/[employer] License   [] Yes  [x] No      Any concerns noted with the machine at this time  [] Yes  [x] No       Assessment/Plan:   1. Obstructive sleep apnea syndrome  Assessment & Plan:  After downloading data and reviewing  Reviewed compliance download with pt. Supplies and parts as needed for his machine. These are medically necessary. Continue medications per his PCP and other physicians. Limit caffeine use after 3pm.    The chronic medical conditions listed are directly related to the primary diagnosis listed above. The management of the primary diagnosis affects the secondary diagnosis and vice versa    Patient is complaint encouraged to maintain compliance to aide on controlling other stated healthcare concerns. 2. Type 2 diabetes mellitus without complication, without long-term current use of insulin (HCC)  Assessment & Plan:  Chronic- stable. After speaking with patient:    Agree with current plan, and would agree to continue this plan per prescribing and managing physician.      3. Morbidly obese ([address]  Assessment & Plan:  Patient encouraged to work on maintaining a healthy weight per height. Achievable with diet restriction/modifications and exercise (may consult primary care if unsure of any restrictions or concerns). Weight management directly correlates to risk of control and maintenance of Obstructive Sleep Apnea. 4. Essential hypertension  Assessment & Plan:  Chronic- stable. After speaking with patient:    Agree with current plan, and would agree to continue this plan per prescribing and managing physician. - After pulling data and reviewing it   - Reviewed compliance download with patient    -Medically necessary supplies and parts as needed for his machine.   - Continue medications per his primary care provider and other physicians.   - Encouraged to limit caffeine use after 3pm.    - Encouraged him to work on weight loss through diet and exercise  - Educated not to drive when feeling sleepy   - Patient [address] [facility]  Compliance  Follow up  After speaking to the patient, patient is currently stable. We will continue with the current machine settings    The chronic medical conditions listed are directly related to the primary diagnosis listed above. The management of the primary diagnosis affects the secondary diagnosis and vice versa.     - Will follow up in off in 12 months    Electronically signed by  Emanuel Gayle, MSN, RN, CNP on 5/17/2021 at 10:26 AM

## 2021-11-26 NOTE — PROGRESS NOTES
2021    PATIENT: Lidia Núñez  : 1945    Primary Care Provider:   Christen Dejesus MD  B:[phone]  P:[phone]    Reason for evaluation:   Chief Complaint   Patient presents with    Coronary Artery Disease     no cardiac symptoms at this time    1 Year Follow Up     w Echo     History of present illness:   Mr. Lidia Núñez is a 78 y.o. male patient here today in annual cardiovascular follow up for history of CAD with CABG x 3 (LIMA-LAD, SV-OM2, SV-PDA in 2015), hypertension, and hyperlipidemia. Waylon reports his 2015 anginal presentation as an overwhelming fatigue and denies any recurrence. He states that he actually feels more energized overall. He has lost approximately 50 pounds total.  He attributes it to less appetite since medication changes regarding his diabetes under the care of [address] [place] [address]. He states that they also have helped him more recently with what he is realizing to be PTSD. No pharmacologic intervention at this time. He states that he is doing well with therapy. Interval history also significant for what ultimately was diagnosed as a herniated disc. He states he had significant improvement with physical therapy, specifically traction, and more recently his first 2 epidural injections. He states that he moves up and down the stairs better at a lighter weight and has no chest pain shortness of breath dizziness or fatigue. Blood pressures at home he states are consistently near 880 mmHg systolic. He has received his first 2 Covid vaccinations and is asking about the booster.   Medical History:      Diagnosis Date    Arthritis     CAD (coronary artery disease)     Cataract     OD    Deviated septum     Erectile dysfunction     Hyperlipidemia     Hypertension     Lumbar herniated disc     Obesity     Obstructive sleep apnea syndrome 2019    Needs work up   [address] traumatic stress disorder     Retinal detachment     OD    Type 2 diabetes mellitus without complication (HCC)     Type II or unspecified type diabetes mellitus without mention of complication, not stated as uncontrolled     Unspecified sleep apnea        Surgical History:      Procedure Laterality Date    CARDIAC SURGERY      CATARACT REMOVAL WITH IMPLANT      OD    CORONARY ARTERY BYPASS GRAFT  2015    Dr. Calin Reaves - Urgent X3 (LIMA-LAD, SVG-OM2 & PDA)    LAP BAND  2008    RETINAL DETACHMENT SURGERY      OD    ROTATOR CUFF REPAIR      SINUS SURGERY Left 2017    FESS       Social History:  Social History     Socioeconomic History    Marital status:      Spouse name: Not on file    Number of children: Not on file    Years of education: Not on file    Highest education level: Not on file   Occupational History    Not on file   Tobacco Use    Smoking status: Former Smoker     Packs/day: 0.10     Years: 1.00     Pack years: 0.10     Quit date:      Years since quittin.9    Smokeless tobacco: Never Used   Vaping Use    Vaping Use: Never used   Substance and Sexual Activity    Alcohol use: Yes     Comment: rarely    Drug use: No    Sexual activity: Not on file   Other Topics Concern    Not on file   Social History Narrative    Not on file     Social Determinants of Health     Financial Resource Strain:     Difficulty of Paying Living Expenses: Not on file   Food Insecurity:     Worried About [address] in the Last Year: Not on file    [address] [facility] [address] in the Last Year: Not on file   Transportation Needs:     Lack of Transportation (Medical): Not on file    Lack of Transportation (Non-Medical):  Not on file   Physical Activity:     Days of Exercise per Week: Not on file    Minutes of Exercise per Session: Not on file   Stress:     Feeling of Stress : Not on file   Social Connections:     Frequency of Communication with Friends and Family: Not on file    Frequency of Social Gatherings with Friends and Family: Not on file    Attends Congregational Services: Not on file    Active Member of Clubs or Organizations: Not on file    Attends Club or Organization Meetings: Not on file    Marital Status: Not on file   Intimate Partner Violence:     Fear of Current or Ex-Partner: Not on file    Emotionally Abused: Not on file    Physically Abused: Not on file    Sexually Abused: Not on file   Housing Stability:     Unable to Pay for Housing in the Last Year: Not on file    Number of [place] in the Last Year: Not on file    Unstable Housing in the Last Year: Not on file        Family History:  No evidence for sudden cardiac death or premature CAD. Problem Relation Age of Onset    Cancer Father         liver    Diabetes Father     Stroke Mother     Alzheimer's Disease Mother     Diabetes Brother     Sleep Apnea Brother     Heart Attack Son     Sleep Apnea Brother        Medications:  [x] Medications and dosages reviewed. Prior to Admission medications    Medication Sig Start Date End Date Taking?  Authorizing Provider   Semaglutide (OZEMPIC, 1 MG/DOSE, SC) Inject 1 mg into the skin once a week   Yes Historical Provider, MD   hydroCHLOROthiazide (HYDRODIURIL) 25 MG tablet Take 6.25 mg by mouth every morning 1/4 tab in the AM   Yes Historical Provider, MD   empagliflozin (JARDIANCE) 25 MG tablet Take 25 mg by mouth daily   Yes Historical Provider, MD   aspirin EC 81 MG EC tablet Take 1 tablet by mouth daily 12/6/21  Yes Amalia Anaya DO   metoprolol succinate (TOPROL XL) 50 MG extended release tablet Take 1 tablet by mouth nightly 12/6/21  Yes Amalia Anaya DO   telmisartan (MICARDIS) 20 MG tablet Take 1 tablet by mouth daily 12/6/21  Yes Amalia Anaya DO   tamsulosin (FLOMAX) 0.4 MG capsule TAKE ONE CAPSULE BY MOUTH DAILY 2/15/21  Yes Yolanda Perez MD   Lancets MISC One Touch Verio IQ Pt test TID 1/20/21  Yes Yolanda Perez MD   ONETOUCH VERIO strip USE ONE STRIP TO TEST DAILY AS NEEDED 1/5/21  Yes Felix Guzman MD   Hydrocort-Pramoxine, Perianal, (PROCTOFOAM HC) 1-1 % rectal foam Topically per rectum daily as needed 1/5/21  Yes Felix Guzman MD   sildenafil (VIAGRA) 100 MG tablet TAKE ONE TABLET BY MOUTH  AS NEEDED FOR ERECTILE DYSFUNCTION 12/4/20  Yes Felix Guzman MD   atorvastatin (LIPITOR) 40 MG tablet TAKE ONE TABLET BY MOUTH DAILY 12/1/20  Yes Suarez Fore, DO   metFORMIN (GLUCOPHAGE) 1000 MG tablet TAKE ONE TABLET BY MOUTH TWICE A DAY WITH MEALS 10/26/20  Yes Felix Guzman MD   glimepiride (AMARYL) 2 MG tablet Take 1 tablet by mouth 2 times daily  Patient taking differently: Take 2 mg by mouth every morning (before breakfast)  10/22/20  Yes Felix Guzman MD   glucose monitoring kit (FREESTYLE) monitoring kit 1 kit by Does not apply route daily as needed 8/17/15  Yes Felix Guzman MD   glucose blood VI test strips (EXACTECH TEST) strip 1 each by In Vitro route 3 times daily As needed.  8/17/15  Yes Felix Guzman MD   Dulaglutide 1.5 MG/0.5ML SOPN Inject 1.5 mg into the skin once a week  Patient not taking: Reported on 12/6/2021 8/7/20   Felix Guzman MD       Allergies:  Ace inhibitors     Review of Systems:    [x]Full ROS obtained and negative except as mentioned in HPI    Physical Examination:    BP (!) 152/80 (Site: Right Upper Arm, Position: Sitting, Cuff Size: Medium Adult)   Pulse 75   Ht 6' (1.829 m)   Wt 238 lb (108 kg)   SpO2 98%   BMI 32.28 kg/m²   Wt Readings from Last 3 Encounters:   12/06/21 238 lb (108 kg)   12/01/20 285 lb 9.6 oz (129.5 kg)   10/22/20 288 lb (130.6 kg)     Vitals:    12/06/21 0952   BP: (!) 152/80   Pulse:    SpO2:        · GENERAL: Well developed, well nourished, no acute distress  · NEUROLOGICAL: Alert and oriented x3  · PSYCH: Normal mood and affect   · SKIN: Warm and dry  · HEENT: Normocephalic, atraumatic, Sclera non-icteric, mucous membranes moist  · NECK: supple, JVP normal  · CARDIAC: Normal PMI, regular rate and rhythm, normal S1S2, no murmur, rub, or gallop  · RESPIRATORY: Normal respiratory effort, clear to auscultation bilaterally  · EXTREMITIES: no edema or clubbing, +2 pulses bilaterally   · MUSCULOSKELETAL: No joint swelling or tenderness, no chest wall tenderness  · GASTROINTESTINAL: normal bowel sounds, soft, non-tender    Imaging:  I have reviewed the below testing personally:    [facility] 8/12/15 (Held)  Codominant  LM- Insignificant plaque noted on IVUS at orifice, mid concentric 50% circumferential plaque noted through bifurcation of LCx on IVUS  LAD- Prox 50% lesion, mid 95% lesion, mid-distal 70% lesion  D2- Ostial 90% lesion  LCx- Prox tubular 50% lesion, AV ostial 99% lesion  OM2- 90% ostial lesion  LT PDA- 95% prox lesion  RCA- Mid 30% lesion  RT PDA- Prox 99% lesion, then area that gives rise to large septal then distal 100% occluded    Collaterals from rPL to distal rPDA  LV Gram 45% EF  Normal EDP  Inferior apical akinesis     Echo 11/16/18   Summary   -DEFINITY was used to better visualize left ventricle.   -Normal left ventricle size and wall thickness. Ejection fraction is   visually estimated to be 40-45%. Global hypokinesis more marked at the apex.   -Mild mitral regurgitation.   -Mild tricuspid regurgitation with RVSP of 30 mmHg. SPECT 10/29/18  Summary    Normal LV size and systolic function. Left ventricular ejection fraction of 59 %. There is a large sized, moderate intensity, completely fixed defect  inferolaterally, inferoapically. There appears to be mild hypokinesis in  these segments but is also with diaphragmatic attenuation artifact. No evidence of myocardium at risk for significant reversible ischemia. VASCULAR:   Carotids 11/16/18   -The right internal carotid artery appears to have a <50% diameter reducing  stenosis based on velocity criteria. Minimal plaque.     -The left internal carotid artery demonstrates no significant stenosis. US for AAA  No evidence of abdominal aortic aneurysm. ABIs 3/26/21  No significant evidence of large vessel arterial occlusive disease of the  lower extremities. 12/6/2021 TTE  Normal left ventricle size and wall thickness. Left ventricular systolic function is borderline normal with an estimated   ejection fraction of 50%. There is inferolateral/inferoapical hypokinesis. Diastolic filling parameters suggests grade I diastolic dysfunction. Trivial mitral regurgitation. Mild tricuspid regurgitation with PASP estimated at 15 mmHg. The right ventricle is normal in size and function. Impression/Recommendations    Mr. Andrea Dahl is a 78 y.o. male patient with:    CAD: 2015 CABG x 3- LIMA-LAD, SV-OM2, SV-RPDA (October 2018 SPECT MPI without ischemia)   Ischemic cardiomyopathy, recovered (LVEF 50% by today's echo)  Hyperlipidemia, controlled (9/2021  TG 92 LDL 72 HDL 43)  Hypertension, stable   Diabetes mellitus, NID, stable (A1C 10/2020 7.1)  Obesity   Nonobstructive carotid artery disease  FRANCISCO on CPAP  ACEI intolerance       Skip has lost approximately 50 lbs since last year and has continued to improve functionally without chest concerns. Echo today in office was with recovery of LVEF to low normal, 50%. Discussed below medication details. Lopressor 12.5 mg BID -->Toprol 25 mg nightly   Telmisartan 20 mg daily  Atorvastatin 40 mg nightly   mg --> 81 mg     Return in about 1 year (around 12/6/2022). Patient Instructions   Aspirin to be changed to 81 mg daily (baby dose)    Stop Metoprolol Tartrate (Lopressor) and replace with Metoprolol Succinate (Toprol) 25 mg daily    No other medication changes    Call us if your home blood pressures are elevated     Call with any other concerns or if you need clearance for any procedures  Follow up in 12 months       Thank you for allowing me to participate in the care of your patient. Please do not hesitate to call. Cassy Ruffin D.O., [facility]  Interventional Cardiology     o: [phone]  [address] [place] [address]., [address] [facility], [address] [place] [address]    NOTE:  This report was transcribed using voice recognition software. Every effort was made to ensure accuracy; however, inadvertent computerized transcription errors may be present. Scribe's Attestation: This note was scribed in the presence of Dr. Julio Barroso, DO by Janett Zavala RN.    I, Cassy Ruffin, have personally performed the services described in this documentation as scribed by Mele Rob RN in my presence, and it is both accurate and complete. An electronic signature was used to authenticate this note.

## 2021-12-06 ENCOUNTER — OFFICE VISIT (OUTPATIENT)
Dept: CARDIOLOGY CLINIC | Age: 76
End: 2021-12-06
Payer: MEDICARE

## 2021-12-06 ENCOUNTER — HOSPITAL ENCOUNTER (OUTPATIENT)
Dept: NON INVASIVE DIAGNOSTICS | Age: 76
Discharge: HOME | End: 2021-12-06
Payer: MEDICARE

## 2021-12-06 ENCOUNTER — TELEPHONE (OUTPATIENT)
Dept: CARDIOLOGY CLINIC | Age: 76
End: 2021-12-06

## 2021-12-06 VITALS
SYSTOLIC BLOOD PRESSURE: 152 MMHG | BODY MASS INDEX: 32.23 KG/M2 | WEIGHT: 238 LBS | OXYGEN SATURATION: 98 % | HEIGHT: 72 IN | DIASTOLIC BLOOD PRESSURE: 80 MMHG | HEART RATE: 75 BPM

## 2021-12-06 DIAGNOSIS — I10 ESSENTIAL HYPERTENSION: Chronic | ICD-10-CM

## 2021-12-06 DIAGNOSIS — E78.2 MIXED HYPERLIPIDEMIA: Chronic | ICD-10-CM

## 2021-12-06 DIAGNOSIS — I25.10 CORONARY ARTERY DISEASE INVOLVING NATIVE CORONARY ARTERY OF NATIVE HEART WITHOUT ANGINA PECTORIS: Primary | Chronic | ICD-10-CM

## 2021-12-06 LAB
LV EF: 50 %
LVEF MODALITY: NORMAL

## 2021-12-06 PROCEDURE — G8484 FLU IMMUNIZE NO ADMIN: HCPCS | Performed by: INTERNAL MEDICINE

## 2021-12-06 PROCEDURE — 6360000004 HC RX CONTRAST MEDICATION: Performed by: INTERNAL MEDICINE

## 2021-12-06 PROCEDURE — C8929 TTE W OR WO FOL WCON,DOPPLER: HCPCS

## 2021-12-06 PROCEDURE — 3017F COLORECTAL CA SCREEN DOC REV: CPT | Performed by: INTERNAL MEDICINE

## 2021-12-06 PROCEDURE — G8417 CALC BMI ABV UP PARAM F/U: HCPCS | Performed by: INTERNAL MEDICINE

## 2021-12-06 PROCEDURE — 99214 OFFICE O/P EST MOD 30 MIN: CPT | Performed by: INTERNAL MEDICINE

## 2021-12-06 PROCEDURE — 1036F TOBACCO NON-USER: CPT | Performed by: INTERNAL MEDICINE

## 2021-12-06 PROCEDURE — 1123F ACP DISCUSS/DSCN MKR DOCD: CPT | Performed by: INTERNAL MEDICINE

## 2021-12-06 PROCEDURE — 4040F PNEUMOC VAC/ADMIN/RCVD: CPT | Performed by: INTERNAL MEDICINE

## 2021-12-06 PROCEDURE — G8427 DOCREV CUR MEDS BY ELIG CLIN: HCPCS | Performed by: INTERNAL MEDICINE

## 2021-12-06 RX ORDER — TELMISARTAN 20 MG/1
20 TABLET ORAL DAILY
Qty: 90 TABLET | Refills: 2
Start: 2021-12-06 | End: 2022-03-07

## 2021-12-06 RX ORDER — METOPROLOL SUCCINATE 50 MG/1
50 TABLET, EXTENDED RELEASE ORAL NIGHTLY
Qty: 90 TABLET | Refills: 3 | Status: SHIPPED | OUTPATIENT
Start: 2021-12-06 | End: 2021-12-06 | Stop reason: SDUPTHER

## 2021-12-06 RX ORDER — EMPAGLIFLOZIN 25 MG/1
25 TABLET, FILM COATED ORAL DAILY
COMMUNITY

## 2021-12-06 RX ORDER — ASPIRIN 81 MG/1
81 TABLET ORAL DAILY
Qty: 90 TABLET | Refills: 1 | Status: SHIPPED | OUTPATIENT
Start: 2021-12-06

## 2021-12-06 RX ORDER — METOPROLOL SUCCINATE 25 MG/1
25 TABLET, EXTENDED RELEASE ORAL NIGHTLY
Qty: 90 TABLET | Refills: 3 | Status: SHIPPED | OUTPATIENT
Start: 2021-12-06

## 2021-12-06 RX ORDER — HYDROCHLOROTHIAZIDE 25 MG/1
6.25 TABLET ORAL EVERY MORNING
COMMUNITY

## 2021-12-06 RX ADMIN — PERFLUTREN 1.3 MG: 6.52 INJECTION, SUSPENSION INTRAVENOUS at 09:43

## 2021-12-06 NOTE — LETTER
[address] Cardiology [place] [address] Joaquin [address] [address]  Phone: [phone]  Fax: 200 [facility]    2022     Britney Banegas MD  [address] [facility] [address]    Patient: Nafisa Peralta   MR Number: [id number]   YOB: 1945   Date of Visit: 2021       Dear Britney Banegas:                                        2021    PATIENT: Nafisa Peralta  : 1945    Primary Care Provider:   Britney Banegas MD  V:[phone]  L:[phone]    Reason for evaluation:   Chief Complaint   Patient presents with    Coronary Artery Disease     no cardiac symptoms at this time    1 Year Follow Up     w Echo     History of present illness:   Mr. Nafisa Peralta is a 78 y.o. male patient here today in annual cardiovascular follow up for history of CAD with CABG x 3 (LIMA-LAD, SV-OM2, SV-PDA in ), hypertension, and hyperlipidemia. Waylon reports his 2015 anginal presentation as an overwhelming fatigue and denies any recurrence. He states that he actually feels more energized overall. He has lost approximately 50 pounds total.  He attributes it to less appetite since medication changes regarding his diabetes under the care of [address] [place] [address]. He states that they also have helped him more recently with what he is realizing to be PTSD. No pharmacologic intervention at this time. He states that he is doing well with therapy. Interval history also significant for what ultimately was diagnosed as a herniated disc. He states he had significant improvement with physical therapy, specifically traction, and more recently his first 2 epidural injections. He states that he moves up and down the stairs better at a lighter weight and has no chest pain shortness of breath dizziness or fatigue. Blood pressures at home he states are consistently near 373 mmHg systolic.   He has received his first 2 Covid vaccinations and is asking about the booster.   Medical History:      Diagnosis Date    Arthritis     CAD (coronary artery disease)     Cataract     OD    Deviated septum     Erectile dysfunction     Hyperlipidemia     Hypertension     Lumbar herniated disc     Obesity     Obstructive sleep apnea syndrome 2019    Needs work up   [address] traumatic stress disorder     Retinal detachment     OD    Type 2 diabetes mellitus without complication ([facility] [address]     Type II or unspecified type diabetes mellitus without mention of complication, not stated as uncontrolled     Unspecified sleep apnea        Surgical History:      Procedure Laterality Date    CARDIAC SURGERY      CATARACT REMOVAL WITH IMPLANT      OD    CORONARY ARTERY BYPASS GRAFT  2015    Dr. Harshad Stoddard - Urgent X3 (LIMA-LAD, SVG-OM2 & PDA)    LAP BAND  2008    RETINAL DETACHMENT SURGERY      OD    ROTATOR CUFF REPAIR      SINUS SURGERY Left 2017    FESS       Social History:  Social History     Socioeconomic History    Marital status:      Spouse name: Not on file    Number of children: Not on file    Years of education: Not on file    Highest education level: Not on file   Occupational History    Not on file   Tobacco Use    Smoking status: Former Smoker     Packs/day: 0.10     Years: 1.00     Pack years: 0.10     Quit date:      Years since quittin.9    Smokeless tobacco: Never Used   Vaping Use    Vaping Use: Never used   Substance and Sexual Activity    Alcohol use: Yes     Comment: rarely    Drug use: No    Sexual activity: Not on file   Other Topics Concern    Not on file   Social History Narrative    Not on file     Social Determinants of Health     Financial Resource Strain:     Difficulty of Paying Living Expenses: Not on file   Food Insecurity:     Worried About [address] in the Last Year: Not on file    [address] in the Last Year: Not on file   Transportation Needs:     Lack of Transportation (Medical): Not on file    Lack of Transportation (Non-Medical): Not on file   Physical Activity:     Days of Exercise per Week: Not on file    Minutes of Exercise per Session: Not on file   Stress:     Feeling of Stress : Not on file   Social Connections:     Frequency of Communication with Friends and Family: Not on file    Frequency of Social Gatherings with Friends and Family: Not on file    Attends Bahai Services: Not on file    Active Member of [address] [place] [address] or Organizations: Not on file    Attends Club or Organization Meetings: Not on file    Marital Status: Not on file   Intimate Partner Violence:     Fear of Current or Ex-Partner: Not on file    Emotionally Abused: Not on file    Physically Abused: Not on file    Sexually Abused: Not on file   Housing Stability:     Unable to Pay for Housing in the Last Year: Not on file    Number of [place] in the Last Year: Not on file    Unstable Housing in the Last Year: Not on file        Family History:  No evidence for sudden cardiac death or premature CAD. Problem Relation Age of Onset    Cancer Father         liver    Diabetes Father     Stroke Mother     Alzheimer's Disease Mother     Diabetes Brother     Sleep Apnea Brother     Heart Attack Son     Sleep Apnea Brother        Medications:  [x] Medications and dosages reviewed. Prior to Admission medications    Medication Sig Start Date End Date Taking?  Authorizing Provider   Semaglutide (OZEMPIC, 1 MG/DOSE, SC) Inject 1 mg into the skin once a week   Yes Historical Provider, MD   hydroCHLOROthiazide (HYDRODIURIL) 25 MG tablet Take 6.25 mg by mouth every morning 1/4 tab in the AM   Yes Historical Provider, MD   empagliflozin (JARDIANCE) 25 MG tablet Take 25 mg by mouth daily   Yes Historical Provider, MD   aspirin EC 81 MG EC tablet Take 1 tablet by mouth daily 12/6/21  Yes Ben Soriano DO   metoprolol succinate (TOPROL XL) 50 MG extended release tablet Take 1 tablet by mouth nightly 12/6/21  Yes Joanna Rondon DO   telmisartan (MICARDIS) 20 MG tablet Take 1 tablet by mouth daily 12/6/21  Yes Joanna Rondon DO   tamsulosin (FLOMAX) 0.4 MG capsule TAKE ONE CAPSULE BY MOUTH DAILY 2/15/21  Yes 9003 JESUS Duran MD   Lancets MISC One Touch Verio IQ Pt test TID 1/20/21  Yes 9003 JESUS Duran MD   ONETOUCH VERIO strip USE ONE STRIP TO TEST DAILY AS NEEDED 1/5/21  Yes 9003 JESUS Duran MD   Hydrocort-Pramoxine, Perianal, (PROCTOFOAM HC) 1-1 % rectal foam Topically per rectum daily as needed 1/5/21  Yes 9003 JESUS Duran MD   sildenafil (VIAGRA) 100 MG tablet TAKE ONE TABLET BY MOUTH  AS NEEDED FOR ERECTILE DYSFUNCTION 12/4/20  Yes 9003 JESUS Duran MD   atorvastatin (LIPITOR) 40 MG tablet TAKE ONE TABLET BY MOUTH DAILY 12/1/20  Yes Joanna Rondon DO   metFORMIN (GLUCOPHAGE) 1000 MG tablet TAKE ONE TABLET BY MOUTH TWICE A DAY WITH MEALS 10/26/20  Yes 9003 JESUS Duran MD   glimepiride (AMARYL) 2 MG tablet Take 1 tablet by mouth 2 times daily  Patient taking differently: Take 2 mg by mouth every morning (before breakfast)  10/22/20  Yes 9003 JESUS Duran MD   glucose monitoring kit (FREESTYLE) monitoring kit 1 kit by Does not apply route daily as needed 8/17/15  Yes 9003 JESUS Duran MD   glucose blood VI test strips (EXACTECH TEST) strip 1 each by In Vitro route 3 times daily As needed.  8/17/15  Yes 9003 JESUS Duran MD   Dulaglutide 1.5 MG/0.5ML SOPN Inject 1.5 mg into the skin once a week  Patient not taking: Reported on 12/6/2021 8/7/20 9003 JESUS Duran MD       Allergies:  Ace inhibitors     Review of Systems:    [x]Full ROS obtained and negative except as mentioned in HPI    Physical Examination:    BP (!) 152/80 (Site: Right Upper Arm, Position: Sitting, Cuff Size: Medium Adult)   Pulse 75   Ht 6' (1.829 m)   Wt 238 lb (108 kg)   SpO2 98%   BMI 32.28 kg/m²   Wt Readings from Last 3 Encounters:   12/06/21 238 lb (108 kg)   12/01/20 285 lb 9.6 oz (129.5 kg)   10/22/20 288 lb (130.6 kg)     Vitals:    12/06/21 0952   BP: (!) 152/80   Pulse:    SpO2:        · GENERAL: Well developed, well nourished, no acute distress  · NEUROLOGICAL: Alert and oriented x3  · PSYCH: Normal mood and affect   · SKIN: Warm and dry  · HEENT: Normocephalic, atraumatic, Sclera non-icteric, mucous membranes moist  · NECK: supple, JVP normal  · CARDIAC: Normal PMI, regular rate and rhythm, normal S1S2, no murmur, rub, or gallop  · RESPIRATORY: Normal respiratory effort, clear to auscultation bilaterally  · EXTREMITIES: no edema or clubbing, +2 pulses bilaterally   · MUSCULOSKELETAL: No joint swelling or tenderness, no chest wall tenderness  · GASTROINTESTINAL: normal bowel sounds, soft, non-tender    Imaging:  I have reviewed the below testing personally:    [facility] 8/12/15 (Held)  Codominant  LM- Insignificant plaque noted on IVUS at orifice, mid concentric 50% circumferential plaque noted through bifurcation of LCx on IVUS  LAD- Prox 50% lesion, mid 95% lesion, mid-distal 70% lesion  D2- Ostial 90% lesion  LCx- Prox tubular 50% lesion, AV ostial 99% lesion  OM2- 90% ostial lesion  LT PDA- 95% prox lesion  RCA- Mid 30% lesion  RT PDA- Prox 99% lesion, then area that gives rise to large septal then distal 100% occluded    Collaterals from rPL to distal rPDA  LV Gram 45% EF  Normal EDP  Inferior apical akinesis     Echo 11/16/18   Summary   -DEFINITY was used to better visualize left ventricle.   -Normal left ventricle size and wall thickness. Ejection fraction is   visually estimated to be 40-45%. Global hypokinesis more marked at the apex.   -Mild mitral regurgitation.   -Mild tricuspid regurgitation with RVSP of 30 mmHg. SPECT 10/29/18  Summary    Normal LV size and systolic function. Left ventricular ejection fraction of 59 %. There is a large sized, moderate intensity, completely fixed defect  inferolaterally, inferoapically.    There appears to be mild hypokinesis in  these segments but is also with diaphragmatic attenuation artifact. No evidence of myocardium at risk for significant reversible ischemia. VASCULAR:   Carotids 11/16/18   -The right internal carotid artery appears to have a <50% diameter reducing  stenosis based on velocity criteria. Minimal plaque. -The left internal carotid artery demonstrates no significant stenosis. US for AAA  No evidence of abdominal aortic aneurysm. ABIs 3/26/21  No significant evidence of large vessel arterial occlusive disease of the  lower extremities. 12/6/2021 TTE  Normal left ventricle size and wall thickness. Left ventricular systolic function is borderline normal with an estimated   ejection fraction of 50%. There is inferolateral/inferoapical hypokinesis. Diastolic filling parameters suggests grade I diastolic dysfunction. Trivial mitral regurgitation. Mild tricuspid regurgitation with PASP estimated at 15 mmHg. The right ventricle is normal in size and function. Impression/Recommendations    Mr. Goldie Browning is a 78 y.o. male patient with:    CAD: 2015 CABG x 3- LIMA-LAD, SV-OM2, SV-RPDA (October 2018 SPECT MPI without ischemia)   Ischemic cardiomyopathy, recovered (LVEF 50% by today's echo)  Hyperlipidemia, controlled (9/2021  TG 92 LDL 72 HDL 43)  Hypertension, stable   Diabetes mellitus, NID, stable (A1C 10/2020 7.1)  Obesity   Nonobstructive carotid artery disease  FRANCISCO on CPAP  ACEI intolerance       Skip has lost approximately 50 lbs since last year and has continued to improve functionally without chest concerns. Echo today in office was with recovery of LVEF to low normal, 50%. Discussed below medication details. Lopressor 12.5 mg BID -->Toprol 25 mg nightly   Telmisartan 20 mg daily  Atorvastatin 40 mg nightly   mg --> 81 mg     Return in about 1 year (around 12/6/2022).   Patient Instructions   Aspirin to be changed to 81 mg daily (baby dose)    Stop Metoprolol Tartrate (Lopressor) and replace with Metoprolol Succinate (Toprol) 25 mg daily    No other medication changes    Call us if your home blood pressures are elevated     Call with any other concerns or if you need clearance for any procedures  Follow up in 12 months       Thank you for allowing me to participate in the care of your patient. Please do not hesitate to call. Stacy Beaulieu D.O., [facility]  Interventional Cardiology     o: [phone]  [address] [place] [address]., [address] [facility], [address] [place] [address]    NOTE:  This report was transcribed using voice recognition software. Every effort was made to ensure accuracy; however, inadvertent computerized transcription errors may be present. Scribe's Attestation: This note was scribed in the presence of Dr. Yonathan Alvarado DO by Bryan Pelaez, EDI.    I, Stacy Bealuieu, have personally performed the services described in this documentation as scribed by Breonna Rob RN in my presence, and it is both accurate and complete. An electronic signature was used to authenticate this note.              Sincerely,      Franko Little DO

## 2021-12-06 NOTE — TELEPHONE ENCOUNTER
New medication scripts faxed to [address] Franco Delgado: Pharmacist Raul Hernández  Fax: [phone]

## 2021-12-06 NOTE — PATIENT INSTRUCTIONS
Aspirin to be changed to 81 mg daily (baby dose)    Stop Metoprolol Tartrate (Lopressor) and replace with Metoprolol Succinate (Toprol) 25 mg daily    No other medication changes    Call us if your home blood pressures are elevated     Call with any other concerns or if you need clearance for any procedures  Follow up in 12 months

## 2022-02-12 PROBLEM — I79.8 OTHER DISORDERS OF ARTERIES, ARTERIOLES AND CAPILLARIES IN DISEASES CLASSIFIED ELSEWHERE (HCC): Status: RESOLVED | Noted: 2018-10-22 | Resolved: 2022-02-12

## 2022-02-13 ASSESSMENT — LIFESTYLE VARIABLES
HOW MANY STANDARD DRINKS CONTAINING ALCOHOL DO YOU HAVE ON A TYPICAL DAY: 0
HOW OFTEN DURING THE LAST YEAR HAVE YOU FOUND THAT YOU WERE NOT ABLE TO STOP DRINKING ONCE YOU HAD STARTED: 0
HOW OFTEN DURING THE LAST YEAR HAVE YOU FAILED TO DO WHAT WAS NORMALLY EXPECTED FROM YOU BECAUSE OF DRINKING: NEVER
HOW MANY STANDARD DRINKS CONTAINING ALCOHOL DO YOU HAVE ON A TYPICAL DAY: ONE OR TWO
HOW OFTEN DO YOU HAVE SIX OR MORE DRINKS ON ONE OCCASION: 0
HOW OFTEN DURING THE LAST YEAR HAVE YOU NEEDED AN ALCOHOLIC DRINK FIRST THING IN THE MORNING TO GET YOURSELF GOING AFTER A NIGHT OF HEAVY DRINKING: 0
AUDIT-C TOTAL SCORE: 1
HOW OFTEN DURING THE LAST YEAR HAVE YOU BEEN UNABLE TO REMEMBER WHAT HAPPENED THE NIGHT BEFORE BECAUSE YOU HAD BEEN DRINKING: 0
HAS A RELATIVE, FRIEND, DOCTOR, OR ANOTHER HEALTH PROFESSIONAL EXPRESSED CONCERN ABOUT YOUR DRINKING OR SUGGESTED YOU CUT DOWN: 0
HOW OFTEN DO YOU HAVE A DRINK CONTAINING ALCOHOL: 1
HOW OFTEN DURING THE LAST YEAR HAVE YOU FOUND THAT YOU WERE NOT ABLE TO STOP DRINKING ONCE YOU HAD STARTED: NEVER
HAVE YOU OR SOMEONE ELSE BEEN INJURED AS A RESULT OF YOUR DRINKING: NO
HAVE YOU OR SOMEONE ELSE BEEN INJURED AS A RESULT OF YOUR DRINKING: 0
HOW OFTEN DURING THE LAST YEAR HAVE YOU FAILED TO DO WHAT WAS NORMALLY EXPECTED FROM YOU BECAUSE OF DRINKING: 0
AUDIT TOTAL SCORE: 0
HAS A RELATIVE, FRIEND, DOCTOR, OR ANOTHER HEALTH PROFESSIONAL EXPRESSED CONCERN ABOUT YOUR DRINKING OR SUGGESTED YOU CUT DOWN: NO
HOW OFTEN DURING THE LAST YEAR HAVE YOU HAD A FEELING OF GUILT OR REMORSE AFTER DRINKING: NEVER
AUDIT-C TOTAL SCORE: 0
HOW OFTEN DO YOU HAVE A DRINK CONTAINING ALCOHOL: MONTHLY OR LESS
HOW OFTEN DURING THE LAST YEAR HAVE YOU HAD A FEELING OF GUILT OR REMORSE AFTER DRINKING: 0
AUDIT TOTAL SCORE: 1
HOW OFTEN DURING THE LAST YEAR HAVE YOU NEEDED AN ALCOHOLIC DRINK FIRST THING IN THE MORNING TO GET YOURSELF GOING AFTER A NIGHT OF HEAVY DRINKING: NEVER
HOW OFTEN DO YOU HAVE SIX OR MORE DRINKS ON ONE OCCASION: NEVER
HOW OFTEN DURING THE LAST YEAR HAVE YOU BEEN UNABLE TO REMEMBER WHAT HAPPENED THE NIGHT BEFORE BECAUSE YOU HAD BEEN DRINKING: NEVER

## 2022-02-13 ASSESSMENT — PATIENT HEALTH QUESTIONNAIRE - PHQ9
SUM OF ALL RESPONSES TO PHQ QUESTIONS 1-9: 0
SUM OF ALL RESPONSES TO PHQ9 QUESTIONS 1 & 2: 0
SUM OF ALL RESPONSES TO PHQ QUESTIONS 1-9: 0
1. LITTLE INTEREST OR PLEASURE IN DOING THINGS: 0
2. FEELING DOWN, DEPRESSED OR HOPELESS: 0

## 2022-02-14 ENCOUNTER — OFFICE VISIT (OUTPATIENT)
Dept: FAMILY MEDICINE CLINIC | Age: 77
End: 2022-02-14

## 2022-02-14 VITALS
WEIGHT: 241 LBS | OXYGEN SATURATION: 98 % | BODY MASS INDEX: 32.64 KG/M2 | DIASTOLIC BLOOD PRESSURE: 78 MMHG | HEART RATE: 84 BPM | SYSTOLIC BLOOD PRESSURE: 124 MMHG | HEIGHT: 72 IN

## 2022-02-14 DIAGNOSIS — E11.9 TYPE 2 DIABETES MELLITUS WITHOUT COMPLICATION, WITHOUT LONG-TERM CURRENT USE OF INSULIN (HCC): ICD-10-CM

## 2022-02-14 DIAGNOSIS — Z00.00 ROUTINE GENERAL MEDICAL EXAMINATION AT A HEALTH CARE FACILITY: Primary | ICD-10-CM

## 2022-02-14 DIAGNOSIS — I10 ESSENTIAL HYPERTENSION: Chronic | ICD-10-CM

## 2022-02-14 DIAGNOSIS — E78.2 MIXED HYPERLIPIDEMIA: Chronic | ICD-10-CM

## 2022-02-14 DIAGNOSIS — E66.01 MORBIDLY OBESE (HCC): ICD-10-CM

## 2022-02-14 DIAGNOSIS — G47.33 OBSTRUCTIVE SLEEP APNEA SYNDROME: Chronic | ICD-10-CM

## 2022-02-14 DIAGNOSIS — I25.10 CORONARY ARTERY DISEASE INVOLVING NATIVE CORONARY ARTERY OF NATIVE HEART WITHOUT ANGINA PECTORIS: Chronic | ICD-10-CM

## 2022-02-14 PROCEDURE — 1123F ACP DISCUSS/DSCN MKR DOCD: CPT | Performed by: FAMILY MEDICINE

## 2022-02-14 PROCEDURE — 83036 HEMOGLOBIN GLYCOSYLATED A1C: CPT | Performed by: FAMILY MEDICINE

## 2022-02-14 PROCEDURE — 3017F COLORECTAL CA SCREEN DOC REV: CPT | Performed by: FAMILY MEDICINE

## 2022-02-14 PROCEDURE — 3046F HEMOGLOBIN A1C LEVEL >9.0%: CPT | Performed by: FAMILY MEDICINE

## 2022-02-14 PROCEDURE — 4040F PNEUMOC VAC/ADMIN/RCVD: CPT | Performed by: FAMILY MEDICINE

## 2022-02-14 PROCEDURE — G0439 PPPS, SUBSEQ VISIT: HCPCS | Performed by: FAMILY MEDICINE

## 2022-02-14 PROCEDURE — G8482 FLU IMMUNIZE ORDER/ADMIN: HCPCS | Performed by: FAMILY MEDICINE

## 2022-02-14 PROCEDURE — 82044 UR ALBUMIN SEMIQUANTITATIVE: CPT | Performed by: FAMILY MEDICINE

## 2022-02-14 SDOH — ECONOMIC STABILITY: FOOD INSECURITY: WITHIN THE PAST 12 MONTHS, YOU WORRIED THAT YOUR FOOD WOULD RUN OUT BEFORE YOU GOT MONEY TO BUY MORE.: NEVER TRUE

## 2022-02-14 SDOH — ECONOMIC STABILITY: FOOD INSECURITY: WITHIN THE PAST 12 MONTHS, THE FOOD YOU BOUGHT JUST DIDN'T LAST AND YOU DIDN'T HAVE MONEY TO GET MORE.: NEVER TRUE

## 2022-02-14 ASSESSMENT — SOCIAL DETERMINANTS OF HEALTH (SDOH): HOW HARD IS IT FOR YOU TO PAY FOR THE VERY BASICS LIKE FOOD, HOUSING, MEDICAL CARE, AND HEATING?: NOT HARD AT ALL

## 2022-02-14 NOTE — PROGRESS NOTES
Medicare Annual Wellness Visit  Name: Wes Tapia Date: 2022   MRN: [id number] Sex: Male   Age: 78 y.o. Ethnicity: Non- / Non    : 1945 Race: White (non-)      Yandy Francis is here for Medicare AWV, Other (pain in right calf ), and Other (patient had ct scan and showed hernia and had phsyical therapy )    Screenings for behavioral, psychosocial and functional/safety risks, and cognitive dysfunction are all negative except as indicated below. These results, as well as other patient data from the [address] [facility] form, are documented in Flowsheets linked to this Encounter. Allergies   Allergen Reactions    Ace Inhibitors      hyperkalemia       Prior to Visit Medications    Medication Sig Taking?  Authorizing Provider   Semaglutide (OZEMPIC, 1 MG/DOSE, SC) Inject 1 mg into the skin once a week Yes Historical Provider, MD   hydroCHLOROthiazide (HYDRODIURIL) 25 MG tablet Take 6.25 mg by mouth every morning 1/4 tab in the AM Yes Historical Provider, MD   empagliflozin (JARDIANCE) 25 MG tablet Take 25 mg by mouth daily Yes Historical Provider, MD   aspirin EC 81 MG EC tablet Take 1 tablet by mouth daily Yes Sbuvmckenna Parks ([place]vet[place]), DO   telmisartan (MICARDIS) 20 MG tablet Take 1 tablet by mouth daily Yes Obed Raiuvetoya), DO   metoprolol succinate (TOPROL XL) 25 MG extended release tablet Take 1 tablet by mouth nightly Yes Obed Martínezetoya), DO   tamsulosin (FLOMAX) 0.4 MG capsule TAKE ONE CAPSULE BY MOUTH DAILY  Patient taking differently: 0.4 mg nightly as needed TAKE ONE CAPSULE BY MOUTH DAILY Yes Jan Ferrera MD   Lancets MISC One Touch Verio IQ Pt test TID Yes Jan Ferrera MD   ONETOUCH VERIO strip USE ONE STRIP TO TEST DAILY AS NEEDED Yes Jan Ferrera MD   Hydrocort-Pramoxine, Perianal, (PROCTOFOAM HC) 1-1 % rectal foam Topically per rectum daily as needed Yes Jan Ferrera MD   sildenafil (VIAGRA) 100 MG tablet TAKE ONE TABLET BY MOUTH AS NEEDED FOR ERECTILE DYSFUNCTION Yes Guanakito Couch MD   atorvastatin (LIPITOR) 40 MG tablet TAKE ONE TABLET BY MOUTH DAILY Yes Jenn Gilbert DO   metFORMIN (GLUCOPHAGE) 1000 MG tablet TAKE ONE TABLET BY MOUTH TWICE A DAY WITH MEALS Yes Guanakito Couch MD   glimepiride (AMARYL) 2 MG tablet Take 1 tablet by mouth 2 times daily  Patient taking differently: Take by mouth every morning  Yes Guanakito Couch MD   glucose monitoring kit (FREESTYLE) monitoring kit 1 kit by Does not apply route daily as needed Yes Guanakito Couch MD   glucose blood VI test strips (EXACTECH TEST) strip 1 each by In Vitro route 3 times daily As needed.  Yes Guanakito Couch MD   Dulaglutide 1.5 MG/0.5ML SOPN Inject 1.5 mg into the skin once a week  Patient not taking: Reported on 12/6/2021  Guanakito Couch MD       Past Medical History:   Diagnosis Date    Arthritis     CAD (coronary artery disease)     Cataract 2000    OD    Deviated septum     Erectile dysfunction     Hyperlipidemia     Hypertension     Lumbar herniated disc     Obesity     Obstructive sleep apnea syndrome 2/26/2019    Needs work up   [address] traumatic stress disorder     Retinal detachment 1991    OD    Type 2 diabetes mellitus without complication ([facility] [address]     Type II or unspecified type diabetes mellitus without mention of complication, not stated as uncontrolled     Unspecified sleep apnea        Past Surgical History:   Procedure Laterality Date    CARDIAC SURGERY      CATARACT REMOVAL WITH IMPLANT  2002    OD    CORONARY ARTERY BYPASS GRAFT  8/11/2015    Dr. Hector Deluna - Urgent X3 (LIMA-LAD, SVG-OM2 & PDA)    LAP BAND  9/2008    RETINAL DETACHMENT SURGERY  1999    OD    ROTATOR CUFF REPAIR      SINUS SURGERY Left 11/27/2017    FESS       Family History   Problem Relation Age of Onset    Cancer Father         liver    Diabetes Father     Stroke Mother     Alzheimer's Disease Mother     Diabetes Brother     Sleep Apnea Brother  Heart Attack Son     Sleep Apnea Brother      Getting meds from the [place] and labs are done at the [place] and [facility] on the [facility]-patient brought copies of these for documentation  CareTeam (Including outside providers/suppliers regularly involved in providing care):   Patient Care Team:  Yung Pina MD as PCP - General (Family Medicine)  Yung Pina MD as PCP - [facility] Empaneled Provider  Narayan Small MD as Consulting Physician (Sleep Medicine)  DARVIN Mari - CNP as Nurse Practitioner (Nurse Practitioner)  [address] [place]  Dentist - [place]  Hand - [place]  Wt Readings from Last 3 Encounters:   02/14/22 241 lb (109.3 kg)   12/06/21 238 lb (108 kg)   12/01/20 285 lb 9.6 oz (129.5 kg)     Vitals:    02/14/22 1558   BP: 124/78   Pulse: 84   SpO2: 98%   Weight: 241 lb (109.3 kg)   Height: 6' (1.829 m)     Body mass index is 32.69 kg/m². Based upon direct observation of the patient, evaluation of cognition reveals recent and remote memory intact. General Appearance: alert and oriented to person, place and time, well-developed and well-nourished, in no acute distress. Obese but weight is down 45 pounds in the last year  ENT: tympanic membrane, external ear and ear canal normal bilaterally, oropharynx clear and moist with normal mucous membranes  Neck: neck supple and non tender without mass, no thyromegaly or thyroid nodules, no cervical lymphadenopathy   Pulmonary/Chest: clear to auscultation bilaterally- no wheezes, rales or rhonchi, normal air movement, no respiratory distress  Cardiovascular: normal rate, normal S1 and S2, no gallops, intact distal pulses and no carotid bruits  Abdomen: soft, non-tender, non-distended, normal bowel sounds, no masses or organomegaly  Extremities: no cyanosis and no clubbing    Patient's complete Health Risk Assessment and screening values have been reviewed and are found in Flowsheets.  The following problems were reviewed today and where indicated follow up appointments were made and/or referrals ordered. Positive Risk Factor Screenings with Interventions:            General Health and ACP:  General  In general, how would you say your health is?: Good  In the past 7 days, have you experienced any of the following?  New or Increased Pain, New or Increased Fatigue, Loneliness, Social Isolation, Stress or Anger?: None of These  Do you get the social and emotional support that you need?: Yes  Do you have a Living Will?: (!) No  Advance Directives     Power of  Living Will ACP-Advance Directive ACP-Power of     Not on File Not on File Not on File Not on File      General Health Risk Interventions:  · Fatigue: regular exercise recommended- 3-5 times per week, 30-45 minutes per session    Health Habits/Nutrition:  Health Habits/Nutrition  Do you exercise for at least 20 minutes 2-3 times per week?: Yes  Have you lost any weight without trying in the past 3 months?: No  Do you eat only one meal per day?: No  Have you seen the dentist within the past year?: Yes  Body mass index: (!) 32.68  Health Habits/Nutrition Interventions:  · Inadequate physical activity:  patient agrees to exercise for at least 150 minutes/week         Personalized Preventive Plan   Current Health Maintenance Status  Immunization History   Administered Date(s) Administered    Influenza Virus Vaccine 10/01/2012, 10/20/2013, 10/16/2014, 10/01/2019    Influenza, High Dose (Fluzone 65 yrs and older) 10/04/2018    Pneumococcal Conjugate 13-valent ([id number]) 03/16/2015    Pneumococcal Polysaccharide ([id number]) 10/10/2006, 04/03/2013    Tdap (Boostrix, Adacel) 03/15/2012    Zoster Live (Zostavax) 10/01/2012    Zoster Recombinant (Shingrix) 05/07/2019, 07/17/2019        Health Maintenance   Topic Date Due    COVID-19 Vaccine (1) Never done    Lipid screen  05/15/2020    Potassium monitoring  01/29/2021    Creatinine monitoring  01/29/2021    Colon cancer screen colonoscopy  09/08/2021    A1C test (Diabetic or Prediabetic)  10/22/2021    Diabetic microalbuminuria test  10/22/2021    DTaP/Tdap/Td vaccine (2 - Td or Tdap) 03/15/2022    Depression Screen  02/13/2023    Diabetic foot exam  02/14/2023    Diabetic retinal exam  02/10/2024    Flu vaccine  Completed    Shingles Vaccine  Completed    Pneumococcal 65+ years Vaccine  Completed    AAA screen  Completed    Hepatitis C screen  Completed    Hepatitis A vaccine  Aged Out    Hib vaccine  Aged Out    Meningococcal (ACWY) vaccine  Aged Out     Recommendations for [employer] Due: see orders and patient instructions/AVS.  . Recommended screening schedule for the next 5-10 years is provided to the patient in written form: see Patient Instructions/AVS.    Gurdeep Ji was seen today for medicare awv, other and other. Diagnoses and all orders for this visit:    Routine general medical examination at a health care facility    Type 2 diabetes mellitus without complication, without long-term current use of insulin ([facility] [address]  -     POCT glycosylated hemoglobin (Hb A1C)  -     POCT microalbumin  -      DIABETES FOOT EXAM  Encounter Diagnoses   Name Primary?     Type 2 diabetes mellitus without complication, without long-term current use of insulin ([facility])     Routine general medical examination at a health care facility Yes   Low back disc disease  Obesity 0 down 40 #  Cad - stable  Shine - sees counselor at [address] [facility]    Get records from [place]  colon due   Turn to office 6 months

## 2022-02-14 NOTE — PATIENT INSTRUCTIONS
Personalized Preventive Plan for Isamar Guadalupe - 2/14/2022  Medicare offers a range of preventive health benefits. Some of the tests and screenings are paid in full while other may be subject to a deductible, co-insurance, and/or copay. Some of these benefits include a comprehensive review of your medical history including lifestyle, illnesses that may run in your family, and various assessments and screenings as appropriate. After reviewing your medical record and screening and assessments performed today your provider may have ordered immunizations, labs, imaging, and/or referrals for you. A list of these orders (if applicable) as well as your Preventive Care list are included within your After Visit Summary for your review. Other Preventive Recommendations:    · A preventive eye exam performed by an eye specialist is recommended every 1-2 years to screen for glaucoma; cataracts, macular degeneration, and other eye disorders. · A preventive dental visit is recommended every 6 months. · Try to get at least 150 minutes of exercise per week or 10,000 steps per day on a pedometer . · Order or download the FREE \"Exercise & Physical Activity: Your Everyday Guide\" from [employer]. Call 0-987.989.2662 or search [employer] online. · You need 5258-6933 mg of calcium and 5817-8120 IU of vitamin D per day. It is possible to meet your calcium requirement with diet alone, but a vitamin D supplement is usually necessary to meet this goal.  · When exposed to the sun, use a sunscreen that protects against both UVA and UVB radiation with an SPF of 30 or greater. Reapply every 2 to 3 hours or after sweating, drying off with a towel, or swimming. · Always wear a seat belt when traveling in a car. Always wear a helmet when riding a bicycle or motorcycle.

## 2022-03-07 ENCOUNTER — ANESTHESIA EVENT (OUTPATIENT)
Dept: ENDOSCOPY | Age: 77
End: 2022-03-07
Payer: MEDICARE

## 2022-03-07 NOTE — PROGRESS NOTES
[facility] ENDOSCOPY COLONOSCOPY PRE-OPERATIVE INSTRUCTIONS    Procedure date_3/7/22________  Arrival time______1130______         Surgery time__1230__________       Clear liquids the day before the procedure. Do not eat or drink anything within 5 hours of your procedure. This includes water chewing gum, mints and ice chips. You may brush your teeth and gargle the morning of your surgery, but do not swallow the water    You may be asked to stop blood thinners such as Coumadin, Plavix, Fragmin, Lovenox, etc., or any anti-inflammatories such as:  Aspirin, Ibuprofen, Advil, Naproxen prior to your procedure. We also ask that you stop any OTC medications such as fish oil, vitamin E, glucosamine, garlic, Multivitamins, COQ 10, etc.    You must make arrangements for a responsible adult to arrive with you and stay in our waiting area during your procedure. They will also need to take you home after your procedure. For your safety you will not be allowed to leave alone or drive yourself home. Also for your safety, it is strongly suggested that someone stay with you the first 24 hours after your procedure. For your comfort, please wear simple loose fitting clothing to the center. Please do not bring valuables. If you have a living will and a durable power of  for healthcare, please bring in a copy. You will need to bring a photo ID and insurance card    Our goal is to provide you with excellent care so if you have any questions, please contact us at the [facility] at [phone]         Please note these are generalized instructions for all colonoscopy cases, you may be provided with more specific instructions if necessaryPreoperative Screening for Elective Surgery/Invasive Procedures While COVID-19 present in the community     Have you had any of the following symptoms?   o Fever, chills  o Cough  o Shortness of breath  o Muscle aches/pain  o Diarrhea  o Abdominal pain, nausea, vomiting  o Loss or decrease in taste and / or smell   Risk of Exposure  o Have you recently been hospitalized for COVID-19 or flu-like illness, if so when?  o Recently diagnosed with COVID-19, if so when?  o Recently tested for COVID-19, if so when?  o Have you been in close contact with a person or family member who currently has or recently had COVID-23? If yes, when and in what context?  o Do you live with anybody who in the last 14 days has had fever, chills, shortness of breath, muscle aches, flu-like illness?  o Do you have any close contacts or family members who are currently in the hospital for COVID-19 or flu-like illness? If yes, assess recent close contact with this person. Indicate if the patient has a positive screen by answering yes to one or more of the above questions. Patients who test positive or screen positive prior to surgery or on the day of surgery should be evaluated in conjunction with the surgeon/proceduralist/anesthesiologist to determine the urgency of the procedure. No to all questions above.

## 2022-03-08 ENCOUNTER — HOSPITAL ENCOUNTER (OUTPATIENT)
Age: 77
Setting detail: OUTPATIENT SURGERY
Discharge: HOME | End: 2022-03-08
Attending: INTERNAL MEDICINE | Admitting: INTERNAL MEDICINE
Payer: MEDICARE

## 2022-03-08 ENCOUNTER — ANESTHESIA (OUTPATIENT)
Dept: ENDOSCOPY | Age: 77
End: 2022-03-08
Payer: MEDICARE

## 2022-03-08 VITALS
TEMPERATURE: 97 F | DIASTOLIC BLOOD PRESSURE: 69 MMHG | HEART RATE: 68 BPM | SYSTOLIC BLOOD PRESSURE: 117 MMHG | BODY MASS INDEX: 32.1 KG/M2 | RESPIRATION RATE: 18 BRPM | HEIGHT: 72 IN | OXYGEN SATURATION: 97 % | WEIGHT: 237 LBS

## 2022-03-08 VITALS — SYSTOLIC BLOOD PRESSURE: 116 MMHG | OXYGEN SATURATION: 98 % | DIASTOLIC BLOOD PRESSURE: 71 MMHG

## 2022-03-08 DIAGNOSIS — Z86.010 HISTORY OF COLON POLYPS: ICD-10-CM

## 2022-03-08 LAB
GLUCOSE BLD-MCNC: 136 MG/DL (ref 70–99)
PERFORMED ON: ABNORMAL

## 2022-03-08 PROCEDURE — 7100000010 HC PHASE II RECOVERY - FIRST 15 MIN: Performed by: INTERNAL MEDICINE

## 2022-03-08 PROCEDURE — 3609010600 HC COLONOSCOPY POLYPECTOMY SNARE/COLD BIOPSY: Performed by: INTERNAL MEDICINE

## 2022-03-08 PROCEDURE — 3700000000 HC ANESTHESIA ATTENDED CARE: Performed by: INTERNAL MEDICINE

## 2022-03-08 PROCEDURE — 2580000003 HC RX 258: Performed by: NURSE ANESTHETIST, CERTIFIED REGISTERED

## 2022-03-08 PROCEDURE — 2709999900 HC NON-CHARGEABLE SUPPLY: Performed by: INTERNAL MEDICINE

## 2022-03-08 PROCEDURE — 6360000002 HC RX W HCPCS: Performed by: NURSE ANESTHETIST, CERTIFIED REGISTERED

## 2022-03-08 PROCEDURE — 2580000003 HC RX 258: Performed by: ANESTHESIOLOGY

## 2022-03-08 PROCEDURE — 7100000011 HC PHASE II RECOVERY - ADDTL 15 MIN: Performed by: INTERNAL MEDICINE

## 2022-03-08 PROCEDURE — 88305 TISSUE EXAM BY PATHOLOGIST: CPT

## 2022-03-08 PROCEDURE — 3700000001 HC ADD 15 MINUTES (ANESTHESIA): Performed by: INTERNAL MEDICINE

## 2022-03-08 PROCEDURE — 2500000003 HC RX 250 WO HCPCS: Performed by: NURSE ANESTHETIST, CERTIFIED REGISTERED

## 2022-03-08 RX ORDER — LIDOCAINE HYDROCHLORIDE 20 MG/ML
INJECTION, SOLUTION EPIDURAL; INFILTRATION; INTRACAUDAL; PERINEURAL PRN
Status: DISCONTINUED | OUTPATIENT
Start: 2022-03-08 | End: 2022-03-08 | Stop reason: SDUPTHER

## 2022-03-08 RX ORDER — SODIUM CHLORIDE 9 MG/ML
INJECTION, SOLUTION INTRAVENOUS CONTINUOUS
Status: DISCONTINUED | OUTPATIENT
Start: 2022-03-08 | End: 2022-03-08 | Stop reason: HOSPADM

## 2022-03-08 RX ORDER — PROPOFOL 10 MG/ML
INJECTION, EMULSION INTRAVENOUS PRN
Status: DISCONTINUED | OUTPATIENT
Start: 2022-03-08 | End: 2022-03-08 | Stop reason: SDUPTHER

## 2022-03-08 RX ORDER — SODIUM CHLORIDE 0.9 % (FLUSH) 0.9 %
10 SYRINGE (ML) INJECTION EVERY 12 HOURS SCHEDULED
Status: DISCONTINUED | OUTPATIENT
Start: 2022-03-08 | End: 2022-03-08 | Stop reason: HOSPADM

## 2022-03-08 RX ORDER — SODIUM CHLORIDE 9 MG/ML
INJECTION, SOLUTION INTRAVENOUS CONTINUOUS PRN
Status: DISCONTINUED | OUTPATIENT
Start: 2022-03-08 | End: 2022-03-08 | Stop reason: SDUPTHER

## 2022-03-08 RX ORDER — SODIUM CHLORIDE 0.9 % (FLUSH) 0.9 %
10 SYRINGE (ML) INJECTION PRN
Status: DISCONTINUED | OUTPATIENT
Start: 2022-03-08 | End: 2022-03-08 | Stop reason: HOSPADM

## 2022-03-08 RX ORDER — SODIUM CHLORIDE 9 MG/ML
25 INJECTION, SOLUTION INTRAVENOUS PRN
Status: DISCONTINUED | OUTPATIENT
Start: 2022-03-08 | End: 2022-03-08 | Stop reason: HOSPADM

## 2022-03-08 RX ADMIN — PROPOFOL 100 MG: 10 INJECTION, EMULSION INTRAVENOUS at 09:00

## 2022-03-08 RX ADMIN — SODIUM CHLORIDE: 9 INJECTION, SOLUTION INTRAVENOUS at 08:11

## 2022-03-08 RX ADMIN — SODIUM CHLORIDE: 9 INJECTION, SOLUTION INTRAVENOUS at 08:56

## 2022-03-08 RX ADMIN — PROPOFOL 50 MG: 10 INJECTION, EMULSION INTRAVENOUS at 09:14

## 2022-03-08 RX ADMIN — PROPOFOL 50 MG: 10 INJECTION, EMULSION INTRAVENOUS at 09:03

## 2022-03-08 RX ADMIN — PROPOFOL 50 MG: 10 INJECTION, EMULSION INTRAVENOUS at 09:06

## 2022-03-08 RX ADMIN — PROPOFOL 50 MG: 10 INJECTION, EMULSION INTRAVENOUS at 09:18

## 2022-03-08 RX ADMIN — LIDOCAINE HYDROCHLORIDE 40 MG: 20 INJECTION, SOLUTION EPIDURAL; INFILTRATION; INTRACAUDAL; PERINEURAL at 09:00

## 2022-03-08 RX ADMIN — PROPOFOL 50 MG: 10 INJECTION, EMULSION INTRAVENOUS at 09:09

## 2022-03-08 ASSESSMENT — PAIN SCALES - GENERAL
PAINLEVEL_OUTOF10: 0

## 2022-03-08 ASSESSMENT — LIFESTYLE VARIABLES: SMOKING_STATUS: 0

## 2022-03-08 ASSESSMENT — PAIN - FUNCTIONAL ASSESSMENT: PAIN_FUNCTIONAL_ASSESSMENT: 0-10

## 2022-03-08 NOTE — PROCEDURES
Colonoscopy Procedure  Note          Patient: Yandy Francis  :       Procedure: Colonoscopy with cold snare polypectomy    Date:  3/8/2022    Primary Care Physician: Jan Ferrera MD     Operative surgeon: Barbara Rojas MD  Previous Colonoscopy: Yes  Consent: I explained and discussed the risk, benefits and alternatives for the procedure with the patient and obtained the patient's consent for the procedure. We discussed the specific risks including bleeding, perforation, post-procedure abdominal pain, and missed lesions which could lead to interval colorectal cancers. History: History of colon polyp colonoscopy to assess     Past Medical History:   Diagnosis Date    Arthritis     CAD (coronary artery disease)     Cataract     OD    Deviated septum     Erectile dysfunction     Hyperlipidemia     Hypertension     Lumbar herniated disc     Obesity     Post traumatic stress disorder     Retinal detachment     OD    Type 2 diabetes mellitus without complication (HCC)     Type 2 diabetes mellitus without complication (HCC)     Type II or unspecified type diabetes mellitus without mention of complication, not stated as uncontrolled     Unspecified sleep apnea     uses a CPAP      Preoperative Diagnosis: History of colon polyps  Post Operative Diagnosis: Diverticulosis colon polyp  ASA: 3  SEDATION: MAC      Procedures Performed: Colonoscopy   Scope Type: Adult    Procedure Details:      With the patient in left lateral decubitus position the endoscope was inserted through the anorectal area into the rectum. The scope was then advanced through the length of the colon to the cecum. The quality of preparation was good. The scope was carefully withdrawn with careful inspection. Images were taken of the multiple segments of colon, cecum, IC valve, rectum. Retroflexion was preformed in the rectum.        Cecum Intubated: Yes  EBL: minimal to none  Complications:  no complications were noted  Post-operative Findings: Perianal exam unremarkable, digital rectal exam and retroflexion demonstrated internal hemorrhoids    Sigmoid diverticulosis    Across from the IC valve there was a 1 cm sessile polyp removed with cold snare resection retrieval complete. Otherwise the colonic mucosa appeared normal    Plan: Repeat colonoscopy in 3 years for colon cancer screening purposes. Signed By: MD Gia Valenzuela MD,   [facility]  3/8/2022      Please note that some or all of this record was generated using voice recognition software. If there are any questions about the content of this document, please contact the author as some errors in translation may have occurred.

## 2022-03-08 NOTE — H&P
[address]   Pre-operative History and Physical    Patient: Joyce Varghese  : 1945  Acct#:     HISTORY OF PRESENT ILLNESS:    The patient is a 78 y.o. male who presents for colon cancer screening    Indications: Colon cancer screening    Past Medical History:        Diagnosis Date    Arthritis     CAD (coronary artery disease)     Cataract     OD    Deviated septum     Erectile dysfunction     Hyperlipidemia     Hypertension     Lumbar herniated disc     Obesity     Post traumatic stress disorder     Retinal detachment     OD    Type 2 diabetes mellitus without complication (HCC)     Type 2 diabetes mellitus without complication ([address]     Type II or unspecified type diabetes mellitus without mention of complication, not stated as uncontrolled     Unspecified sleep apnea     uses a CPAP      Past Surgical History:        Procedure Laterality Date    CARDIAC SURGERY      CATARACT REMOVAL WITH IMPLANT      OD    CORONARY ARTERY BYPASS GRAFT  2015    Dr. Geovanna Roth - Urgent X3 (LIMA-LAD, SVG-OM2 & PDA)    LAP BAND  2008    RETINAL DETACHMENT SURGERY      OD    ROTATOR CUFF REPAIR      SINUS SURGERY Left 2017    FESS      Medications Prior to Admission:   No current facility-administered medications on file prior to encounter.      Current Outpatient Medications on File Prior to Encounter   Medication Sig Dispense Refill    Semaglutide (OZEMPIC, 1 MG/DOSE, SC) Inject 1 mg into the skin once a week      hydroCHLOROthiazide (HYDRODIURIL) 25 MG tablet Take 6.25 mg by mouth every morning 1/4 tab in the AM      empagliflozin (JARDIANCE) 25 MG tablet Take 25 mg by mouth daily      aspirin EC 81 MG EC tablet Take 1 tablet by mouth daily 90 tablet 1    metoprolol succinate (TOPROL XL) 25 MG extended release tablet Take 1 tablet by mouth nightly 90 tablet 3    tamsulosin (FLOMAX) 0.4 MG capsule TAKE ONE CAPSULE BY MOUTH DAILY (Patient taking differently: 0.4 mg nightly as needed TAKE ONE CAPSULE BY MOUTH DAILY) 90 capsule 2    atorvastatin (LIPITOR) 40 MG tablet TAKE ONE TABLET BY MOUTH DAILY 90 tablet 3    metFORMIN (GLUCOPHAGE) 1000 MG tablet TAKE ONE TABLET BY MOUTH TWICE A DAY WITH MEALS 180 tablet 1    glimepiride (AMARYL) 2 MG tablet Take 1 tablet by mouth 2 times daily (Patient taking differently: Take 1 mg by mouth every morning (before breakfast) ) 180 tablet 3    Lancets MISC One Touch Verio IQ Pt test  each 3    ONETOUCH VERIO strip USE ONE STRIP TO TEST DAILY AS NEEDED 50 strip 2    sildenafil (VIAGRA) 100 MG tablet TAKE ONE TABLET BY MOUTH  AS NEEDED FOR ERECTILE DYSFUNCTION 10 tablet 3    glucose monitoring kit (FREESTYLE) monitoring kit 1 kit by Does not apply route daily as needed 1 kit 0    glucose blood VI test strips (EXACTECH TEST) strip 1 each by In Vitro route 3 times daily As needed.  100 each 3        Allergies:  Ace inhibitors    Social History:   Social History     Socioeconomic History    Marital status:      Spouse name: Not on file    Number of children: Not on file    Years of education: Not on file    Highest education level: Not on file   Occupational History    Not on file   Tobacco Use    Smoking status: Former Smoker     Packs/day: 0.10     Years: 1.00     Pack years: 0.10     Quit date: 1     Years since quittin.2    Smokeless tobacco: Never Used   Vaping Use    Vaping Use: Never used   Substance and Sexual Activity    Alcohol use: Yes     Comment: rarely    Drug use: No    Sexual activity: Not on file   Other Topics Concern    Not on file   Social History Narrative    Not on file     Social Determinants of Health     Financial Resource Strain: Low Risk     Difficulty of Paying Living Expenses: Not hard at all   Food Insecurity: No Food Insecurity    Worried About [facility] in the Last Year: Never true    [place] of Food in the Last Year: Never true   Transportation Needs:  Lack of Transportation (Medical): Not on file    Lack of Transportation (Non-Medical): Not on file   Physical Activity:     Days of Exercise per Week: Not on file    Minutes of Exercise per Session: Not on file   Stress:     Feeling of Stress : Not on file   Social Connections:     Frequency of Communication with Friends and Family: Not on file    Frequency of Social Gatherings with Friends and Family: Not on file    Attends Jain Services: Not on file    Active Member of [address] [place] [address] or Organizations: Not on file    Attends Club or Organization Meetings: Not on file    Marital Status: Not on file   Intimate Partner Violence:     Fear of Current or Ex-Partner: Not on file    Emotionally Abused: Not on file    Physically Abused: Not on file    Sexually Abused: Not on file   Housing Stability:     Unable to Pay for Housing in the Last Year: Not on file    Number of [place] in the Last Year: Not on file    Unstable Housing in the Last Year: Not on file      Family History:       Problem Relation Age of Onset    Cancer Father         liver    Diabetes Father     Stroke Mother     Alzheimer's Disease Mother     Diabetes Brother     Sleep Apnea Brother     Heart Attack Son     Sleep Apnea Brother         PHYSICAL EXAM:      /80   Pulse 83   Temp 96.9 °F (36.1 °C) (Temporal)   Resp 17   Ht 6' (1.829 m)   Wt 237 lb (107.5 kg)   SpO2 99%   BMI 32.14 kg/m²  I        Heart:  Normal apical impulse, regular rate and rhythm, normal S1 and S2, no S3 or S4, and no murmur noted    Lungs:  No increased work of breathing, good air exchange, clear to auscultation bilaterally, no crackles or wheezing    Abdomen:  No scars, normal bowel sounds, soft, non-distended, non-tender, no masses palpated, no hepatosplenomegally      ASA Class  ASA 2 - Patient with mild systemic disease with no functional limitations    Mallampati Class: 2      ASSESSMENT AND PLAN:    1.   Patient is a suitable candidate for endoscopic procedure and attendant anesthesia  2. Risks, benefits, alternatives of procedure discussed in detail with patient including risks of bleeding, infection, perforation, risks of sedation, risks of missed lesions. The patient wishes to proceed.

## 2022-03-08 NOTE — ANESTHESIA POSTPROCEDURE EVALUATION
Department of Anesthesiology  Postprocedure Note    Patient: Gulshan Dickinson  MRN: [id number]  YOB: 1945  Date of evaluation: 3/8/2022  Time:  12:33 PM     Procedure Summary     Date: 03/08/22 Room / Location: [address]    Anesthesia Start: 7413 Anesthesia Stop: 0930    Procedure: COLONOSCOPY POLYPECTOMY SNARE/COLD BIOPSY (N/A ) Diagnosis:       History of colon polyps      (History of colon polyps)    Surgeons: Wilian Guajardo MD Responsible Provider: Dane Beal MD    Anesthesia Type: MAC ASA Status: 3          Anesthesia Type: MAC    Jaron Phase I: Jaron Score: 10    Jaron Phase II: Jaron Score: 10    Last vitals: Reviewed and per EMR flowsheets. Anesthesia Post Evaluation    Patient location during evaluation: PACU  Patient participation: complete - patient participated  Level of consciousness: awake and alert  Airway patency: patent  Nausea & Vomiting: no nausea and no vomiting  Complications: no  Cardiovascular status: hemodynamically stable and blood pressure returned to baseline  Respiratory status: spontaneous ventilation, nonlabored ventilation and room air  Hydration status: stable  Comments: Appropriate for discharge home with .        Dane Beal MD

## 2022-03-08 NOTE — ANESTHESIA PRE PROCEDURE
Department of Anesthesiology  Preprocedure Note       Name:  Teresa Rudolph   Age:  78 y.o.  :  1945                                          MRN:  [id number]         Date:  3/8/2022      Surgeon: Chencho Sim):  Gia Nelson MD    Procedure: Procedure(s):  COLONOSCOPY DIAGNOSTIC    Medications prior to admission:   Prior to Admission medications    Medication Sig Start Date End Date Taking?  Authorizing Provider   Semaglutide (OZEMPIC, 1 MG/DOSE, SC) Inject 1 mg into the skin once a week   Yes Historical Provider, MD   hydroCHLOROthiazide (HYDRODIURIL) 25 MG tablet Take 6.25 mg by mouth every morning 1/4 tab in the AM   Yes Historical Provider, MD   empagliflozin (JARDIANCE) 25 MG tablet Take 25 mg by mouth daily   Yes Historical Provider, MD   aspirin EC 81 MG EC tablet Take 1 tablet by mouth daily 21  Yes Ilya Kramer DO   metoprolol succinate (TOPROL XL) 25 MG extended release tablet Take 1 tablet by mouth nightly 21  Yes Ilya Kramer DO   tamsulosin (FLOMAX) 0.4 MG capsule TAKE ONE CAPSULE BY MOUTH DAILY  Patient taking differently: 0.4 mg nightly as needed TAKE ONE CAPSULE BY MOUTH DAILY 2/15/21  Yes Dilan Koroma MD   atorvastatin (LIPITOR) 40 MG tablet TAKE ONE TABLET BY MOUTH DAILY 20  Yes Ilya Kramer DO   metFORMIN (GLUCOPHAGE) 1000 MG tablet TAKE ONE TABLET BY MOUTH TWICE A DAY WITH MEALS 10/26/20  Yes Dilan Koroma MD   glimepiride (AMARYL) 2 MG tablet Take 1 tablet by mouth 2 times daily  Patient taking differently: Take 1 mg by mouth every morning (before breakfast)  10/22/20  Yes Dilan Koroma MD   Lancets MISC One Touch Verio IQ Pt test TID 21   Dilan Koroma MD   ONETOUCH VERIO strip USE ONE STRIP TO TEST DAILY AS NEEDED 21   Dilan Koroma MD   sildenafil (VIAGRA) 100 MG tablet TAKE ONE TABLET BY MOUTH  AS NEEDED FOR ERECTILE DYSFUNCTION 20   Dilan Koroma MD   glucose monitoring kit (FREESTYLE) monitoring kit 1 kit by Does not apply route daily as needed 8/17/15   Sindy Nagel MD   glucose blood VI test strips (EXACTECH TEST) strip 1 each by In Vitro route 3 times daily As needed. 8/17/15   Sindy Nagel MD       Current medications:    Current Facility-Administered Medications   Medication Dose Route Frequency Provider Last Rate Last Admin    0.9 % sodium chloride infusion   IntraVENous Continuous Zahra Portillo MD        sodium chloride flush 0.9 % injection 10 mL  10 mL IntraVENous 2 times per day Zahra Portillo MD        sodium chloride flush 0.9 % injection 10 mL  10 mL IntraVENous PRN Zahra Portillo MD        0.9 % sodium chloride infusion  25 mL IntraVENous PRN Zahra Portillo MD           Allergies:     Allergies   Allergen Reactions    Ace Inhibitors      hyperkalemia       Problem List:    Patient Active Problem List   Diagnosis Code    Essential hypertension I10    Hyperlipidemia E78.5    Type 2 diabetes mellitus without complication ([facility] [address] D66.3    Tonsillith J35.8    Acute chest pain R07.9    CAD (coronary artery disease) I25.10    Subacute maxillary sinusitis J01.00    Chronic left maxillary sinusitis J32.0    Obstructive sleep apnea syndrome G47.33    Morbidly obese (HCC) E66.01    CMC arthritis M19.049    De Quervain's tenosynovitis M65.4       Past Medical History:        Diagnosis Date    Arthritis     CAD (coronary artery disease)     Cataract 2000    OD    Deviated septum     Erectile dysfunction     Hyperlipidemia     Hypertension     Lumbar herniated disc     Obesity     Post traumatic stress disorder     Retinal detachment 1991    OD    Type 2 diabetes mellitus without complication (HCC)     Type 2 diabetes mellitus without complication (HCC)     Type II or unspecified type diabetes mellitus without mention of complication, not stated as uncontrolled     Unspecified sleep apnea     uses a CPAP       Past Surgical History: Procedure Laterality Date    CARDIAC SURGERY      CATARACT REMOVAL WITH IMPLANT      OD    CORONARY ARTERY BYPASS GRAFT  2015    Dr. Meche Brandt - Urgent X3 (LIMA-LAD, SVG-OM2 & PDA)    LAP BAND  2008    RETINAL DETACHMENT SURGERY      OD    ROTATOR CUFF REPAIR      SINUS SURGERY Left 2017    FESS       Social History:    Social History     Tobacco Use    Smoking status: Former Smoker     Packs/day: 0.10     Years: 1.00     Pack years: 0.10     Quit date:      Years since quittin.2    Smokeless tobacco: Never Used   Substance Use Topics    Alcohol use: Yes     Comment: rarely                                Counseling given: Not Answered      Vital Signs (Current):   Vitals:    22 1148 22 0800 22 0805   BP:   137/80   Pulse:   83   Resp:   17   Temp:   96.9 °F (36.1 °C)   TempSrc:   Temporal   SpO2:   99%   Weight: 241 lb (109.3 kg) 237 lb (107.5 kg)    Height: 6' (1.829 m) 6' (1.829 m)                                               BP Readings from Last 3 Encounters:   22 137/80   22 124/78   21 (!) 152/80       NPO Status: Time of last liquid consumption: 315                        Time of last solid consumption:                         Date of last liquid consumption: 22                        Date of last solid food consumption: 22    BMI:   Wt Readings from Last 3 Encounters:   22 237 lb (107.5 kg)   22 241 lb (109.3 kg)   21 238 lb (108 kg)     Body mass index is 32.14 kg/m².     CBC:   Lab Results   Component Value Date    WBC 6.2 2020    RBC 4.36 2020    HGB 13.8 2020    HCT 41.1 2020    MCV 94.2 2020    RDW 14.3 2020     2020       CMP:   Lab Results   Component Value Date     2020    K 4.9 2020     2020    CO2 25 2020    BUN 14 2020    CREATININE 1.0 2020    GFRAA >60 2020    GFRAA >60 2013 AGRATIO 1.7 01/29/2020    LABGLOM >60 01/29/2020    GLUCOSE 89 01/29/2020    PROT 7.2 01/29/2020    PROT 7.4 02/29/2012    CALCIUM 9.6 01/29/2020    BILITOT 0.6 01/29/2020    ALKPHOS 47 01/29/2020    AST 20 01/29/2020    ALT 23 01/29/2020       POC Tests: No results for input(s): POCGLU, POCNA, POCK, POCCL, POCBUN, POCHEMO, POCHCT in the last 72 hours. Coags:   Lab Results   Component Value Date    PROTIME 12.1 08/09/2015    INR 1.11 08/09/2015    APTT 53.5 08/11/2015       HCG (If Applicable): No results found for: PREGTESTUR, PREGSERUM, HCG, HCGQUANT     ABGs:   Lab Results   Component Value Date    PHART 7.357 08/11/2015    PO2ART 95 08/11/2015    FGF5SRV 40 08/11/2015    XZJ6JBF 22.8 08/11/2015    BEART -3 08/11/2015    D2OOBPCE 97 08/11/2015        Type & Screen (If Applicable):  No results found for: LABABO, LABRH    Drug/Infectious Status (If Applicable):  No results found for: HIV, HEPCAB    COVID-19 Screening (If Applicable): No results found for: COVID19        Anesthesia Evaluation   no history of anesthetic complications:   Airway: Mallampati: III  TM distance: >3 FB     Comment: Borderline mouth opening   Dental:      Comment: Fair dentition; denies loose teeth    Pulmonary:   (+) sleep apnea: on CPAP,      (-) COPD, asthma, rhonchi, wheezes, rales and not a current smoker (s/p remote cessation)                           Cardiovascular:  Exercise tolerance: no interval change,   (+) hypertension:, CAD:, CABG/stent:, hyperlipidemia    (-) murmur, weak pulses and peripheral edema      Rhythm: regular  Rate: normal           Beta Blocker:  Dose within 24 Hrs      ROS comment: Echocardiogram (2021):   Summary   Normal left ventricle size and wall thickness. Left ventricular systolic function is borderline normal with an estimated ejection fraction of 50%. There is inferolateral/inferoapical hypokinesis. Diastolic filling parameters suggests grade I diastolic dysfunction. Trivial mitral regurgitation. Mild tricuspid regurgitation with PASP estimated at 15 mmHg. The right ventricle is normal in size and function. Neuro/Psych:   (+) psychiatric history (PTSD):   (-) seizures, TIA and CVA           GI/Hepatic/Renal:        (-) liver disease and no renal diseaseMorbid obesity: s/p lap band, BMI: 32.7. Endo/Other:    (+) DiabetesType II DM, , .    (-) blood dyscrasia               Abdominal:   (+) obese,     Abdomen: soft. Vascular: Other Findings:             Anesthesia Plan      MAC     ASA 3     (NPO appropriate;  denies active nausea / reflux. )  Induction: intravenous. Anesthetic plan and risks discussed with patient. Plan discussed with CRNA. This pre-anesthesia assessment may be used as a history and physical.    DOS STAFF ADDENDUM:    Pt seen and examined, chart reviewed (including anesthesia, drug and allergy history). No interval changes to history and physical examination. Anesthetic plan, risks, benefits, alternatives, and personnel involved discussed with patient. Patient verbalized an understanding and agrees to proceed.       Analilia See MD  March 8, 2022  8:08 AM

## 2022-04-14 LAB
AVERAGE GLUCOSE: NORMAL
HBA1C MFR BLD: 7.4 %

## 2022-04-18 ENCOUNTER — TELEPHONE (OUTPATIENT)
Dept: PULMONOLOGY | Age: 77
End: 2022-04-18

## 2022-04-18 NOTE — TELEPHONE ENCOUNTER
Patient received new machine from [employer], he is going to try and switch the modem over himself and will call back if he is unable to. [phone].

## 2022-04-19 ENCOUNTER — TELEPHONE (OUTPATIENT)
Dept: PULMONOLOGY | Age: 77
End: 2022-04-19

## 2022-04-19 NOTE — TELEPHONE ENCOUNTER
Spoke with pt. Pt now has the [employer].  Pt was instructed to plug unit in and data should transfer

## 2022-04-19 NOTE — TELEPHONE ENCOUNTER
Patient left message, he was not able to switch the modem over himself, he says there is no place for the it to go on his new machine, please advise.

## 2022-04-27 ENCOUNTER — OFFICE VISIT (OUTPATIENT)
Dept: FAMILY MEDICINE CLINIC | Age: 77
End: 2022-04-27
Payer: MEDICARE

## 2022-04-27 VITALS
BODY MASS INDEX: 32.78 KG/M2 | SYSTOLIC BLOOD PRESSURE: 116 MMHG | OXYGEN SATURATION: 100 % | HEART RATE: 80 BPM | DIASTOLIC BLOOD PRESSURE: 79 MMHG | WEIGHT: 242 LBS | HEIGHT: 72 IN

## 2022-04-27 DIAGNOSIS — G47.33 OBSTRUCTIVE SLEEP APNEA SYNDROME: Chronic | ICD-10-CM

## 2022-04-27 DIAGNOSIS — E11.9 TYPE 2 DIABETES MELLITUS WITHOUT COMPLICATION, WITHOUT LONG-TERM CURRENT USE OF INSULIN (HCC): Chronic | ICD-10-CM

## 2022-04-27 DIAGNOSIS — I25.10 CORONARY ARTERY DISEASE INVOLVING NATIVE CORONARY ARTERY OF NATIVE HEART WITHOUT ANGINA PECTORIS: Chronic | ICD-10-CM

## 2022-04-27 DIAGNOSIS — E66.01 MORBIDLY OBESE (HCC): Primary | ICD-10-CM

## 2022-04-27 DIAGNOSIS — I10 ESSENTIAL HYPERTENSION: Chronic | ICD-10-CM

## 2022-04-27 DIAGNOSIS — E78.2 MIXED HYPERLIPIDEMIA: Chronic | ICD-10-CM

## 2022-04-27 PROCEDURE — 99213 OFFICE O/P EST LOW 20 MIN: CPT | Performed by: FAMILY MEDICINE

## 2022-04-27 PROCEDURE — 4040F PNEUMOC VAC/ADMIN/RCVD: CPT | Performed by: FAMILY MEDICINE

## 2022-04-27 PROCEDURE — 2022F DILAT RTA XM EVC RTNOPTHY: CPT | Performed by: FAMILY MEDICINE

## 2022-04-27 PROCEDURE — 3046F HEMOGLOBIN A1C LEVEL >9.0%: CPT | Performed by: FAMILY MEDICINE

## 2022-04-27 PROCEDURE — G8427 DOCREV CUR MEDS BY ELIG CLIN: HCPCS | Performed by: FAMILY MEDICINE

## 2022-04-27 PROCEDURE — 3017F COLORECTAL CA SCREEN DOC REV: CPT | Performed by: FAMILY MEDICINE

## 2022-04-27 PROCEDURE — G8417 CALC BMI ABV UP PARAM F/U: HCPCS | Performed by: FAMILY MEDICINE

## 2022-04-27 PROCEDURE — 1123F ACP DISCUSS/DSCN MKR DOCD: CPT | Performed by: FAMILY MEDICINE

## 2022-04-27 PROCEDURE — 1036F TOBACCO NON-USER: CPT | Performed by: FAMILY MEDICINE

## 2022-04-27 RX ORDER — CEPHALEXIN 500 MG/1
500 CAPSULE ORAL 3 TIMES DAILY
Qty: 21 CAPSULE | Refills: 0 | Status: SHIPPED | OUTPATIENT
Start: 2022-04-27 | End: 2022-08-15

## 2022-04-27 NOTE — PROGRESS NOTES
Fili Wills is a 78 y.o. male. HPI: Here on some wood about 7 to 10 days ago on the top of his hand he washed out thoroughly but thinks he may be a splinter in there  Here for complex medical visit  Sees MD at [place]  Had jil  Sees a counselor at the [place] for PTSD from [place] ophthalmologist yearly  No  open sores on his feet no numbness or tingling  Meds, vitamins and allergies reviewed with pt    ROS: No TIA's or unusual headaches, no dysphagia. No prolonged cough. No dyspnea or chest pain on exertion. No abdominal pain, change in bowel habits, black or bloody stools. No urinary tract symptoms. No new or unusual musculoskeletal symptoms. Prior to Visit Medications    Medication Sig Taking?  Authorizing Provider   Semaglutide (OZEMPIC, 1 MG/DOSE, SC) Inject 1 mg into the skin once a week Yes Historical Provider, MD   hydroCHLOROthiazide (HYDRODIURIL) 25 MG tablet Take 6.25 mg by mouth every morning 1/4 tab in the AM Yes Historical Provider, MD   empagliflozin (JARDIANCE) 25 MG tablet Take 25 mg by mouth daily Yes Historical Provider, MD   aspirin EC 81 MG EC tablet Take 1 tablet by mouth daily Yes Dolores Zamora DO   metoprolol succinate (TOPROL XL) 25 MG extended release tablet Take 1 tablet by mouth nightly Yes Dolores Zamora DO   tamsulosin (FLOMAX) 0.4 MG capsule TAKE ONE CAPSULE BY MOUTH DAILY  Patient taking differently: 0.4 mg nightly as needed TAKE ONE CAPSULE BY MOUTH DAILY Yes Kylee Puentes MD   Lancets MISC One Touch Verio IQ Pt test TID Yes Kylee Puentes MD   ONETOUCH VERIO strip USE ONE STRIP TO TEST DAILY AS NEEDED Yes Kylee Puentes MD   sildenafil (VIAGRA) 100 MG tablet TAKE ONE TABLET BY MOUTH  AS NEEDED FOR ERECTILE DYSFUNCTION Yes Kylee Puentes MD   atorvastatin (LIPITOR) 40 MG tablet TAKE ONE TABLET BY MOUTH DAILY  Patient taking differently: 80 mg TAKE ONE TABLET BY MOUTH DAILY Yes Dolores Zamora DO   metFORMIN (GLUCOPHAGE) 1000 MG tablet TAKE Sobedia Boss Yes Alessio Robins MD   glimepiride (AMARYL) 2 MG tablet Take 1 tablet by mouth 2 times daily  Patient taking differently: Take 1 mg by mouth every morning (before breakfast)  Yes Alessio Robins MD   glucose monitoring kit (FREESTYLE) monitoring kit 1 kit by Does not apply route daily as needed Yes Alessio Robins MD   glucose blood VI test strips (EXACTECH TEST) strip 1 each by In Vitro route 3 times daily As needed. Yes Alessio Robins MD       Past Medical History:   Diagnosis Date    Arthritis     CAD (coronary artery disease)     Cataract     OD    Deviated septum     Erectile dysfunction     Hyperlipidemia     Hypertension     Lumbar herniated disc     Obesity     Post traumatic stress disorder     Retinal detachment     OD    Type 2 diabetes mellitus without complication (HCC)     Type 2 diabetes mellitus without complication (HCC)     Type II or unspecified type diabetes mellitus without mention of complication, not stated as uncontrolled     Unspecified sleep apnea     uses a CPAP       Social History     Tobacco Use    Smoking status: Former Smoker     Packs/day: 0.10     Years: 1.00     Pack years: 0.10     Quit date:      Years since quittin.3    Smokeless tobacco: Never Used   Vaping Use    Vaping Use: Never used   Substance Use Topics    Alcohol use: Yes     Comment: rarely    Drug use: No       Family History   Problem Relation Age of Onset   Prashant Rivera Cancer Father         liver    Diabetes Father     Stroke Mother     Alzheimer's Disease Mother     Diabetes Brother     Sleep Apnea Brother     Heart Attack Son     Sleep Apnea Brother        Allergies   Allergen Reactions    Ace Inhibitors      hyperkalemia       OBJECTIVE:  /79   Pulse 80   Ht 6' (1.829 m)   Wt 242 lb (109.8 kg)   SpO2 100%   BMI 32.82 kg/m²   GEN:  in NAD for 5 pounds  NECK:  Supple without adenopathy.   CV:  Regular rate and rhythm, S1 and S2 normal, no murmurs, clicks  PULM:  Chest is clear, no wheezing ,  symmetric air entry throughout both lung fields. EXT: No rash or edema  Right hand dorsal surface there is a small 8 x 8 x 6 mm papule that is tender and slightly red  With some squeezing expressed about 1 to 2 cc of slightly purulent serosanguineous fluid no forearm body noted  Motor and range of motion intact there is no impingement of extension of the fingers no redness or red streaks  NEURO: nonfocal    : Triglycerides 129, HDL 42, LDL 80, cholesterol 128  A1c 7.4 was 7.0, glucose 154, BUN 16, sodium 140, potassium 4.8, chloride 105, CO2 28  ASSESSMENT/PLAN:  1. Morbidly obese (HCC)  Slightly elevated  Urged better diet and more exercise    2. Type 2 diabetes mellitus without complication, without long-term current use of insulin ([facility])  A1c is up from 7-7.4 urged diet and exercise    3. Coronary artery disease involving native coronary artery of native heart without angina pectoris  Stable continue aggressive risk factor management    4. Essential hypertension  Stable, continue present medication    5. Mixed hyperlipidemia  ldl - 80, inc lipitor from 40 to 80    6.  Obstructive sleep apnea syndrome  CPAP compliant  Further weight loss    7 right    Dorsal hand foeign body/wound  Hot soaks  Keflex 500 3 times daily  See hand specialist as needed    8 ptsd re [place] [facility]  Does vv with counselor through [place]

## 2022-05-16 ENCOUNTER — TELEMEDICINE (OUTPATIENT)
Dept: PULMONOLOGY | Age: 77
End: 2022-05-16
Payer: MEDICARE

## 2022-05-16 DIAGNOSIS — G47.33 OBSTRUCTIVE SLEEP APNEA SYNDROME: Primary | Chronic | ICD-10-CM

## 2022-05-16 DIAGNOSIS — I10 ESSENTIAL HYPERTENSION: Chronic | ICD-10-CM

## 2022-05-16 DIAGNOSIS — E66.9 OBESITY (BMI 30.0-34.9): ICD-10-CM

## 2022-05-16 DIAGNOSIS — E11.9 TYPE 2 DIABETES MELLITUS WITHOUT COMPLICATION, WITHOUT LONG-TERM CURRENT USE OF INSULIN (HCC): Chronic | ICD-10-CM

## 2022-05-16 DIAGNOSIS — I25.10 CORONARY ARTERY DISEASE INVOLVING NATIVE CORONARY ARTERY OF NATIVE HEART WITHOUT ANGINA PECTORIS: Chronic | ICD-10-CM

## 2022-05-16 PROBLEM — E66.811 OBESITY (BMI 30.0-34.9): Status: ACTIVE | Noted: 2019-02-26

## 2022-05-16 PROCEDURE — 1123F ACP DISCUSS/DSCN MKR DOCD: CPT | Performed by: NURSE PRACTITIONER

## 2022-05-16 PROCEDURE — 3051F HG A1C>EQUAL 7.0%<8.0%: CPT | Performed by: NURSE PRACTITIONER

## 2022-05-16 PROCEDURE — 2022F DILAT RTA XM EVC RTNOPTHY: CPT | Performed by: NURSE PRACTITIONER

## 2022-05-16 PROCEDURE — 3017F COLORECTAL CA SCREEN DOC REV: CPT | Performed by: NURSE PRACTITIONER

## 2022-05-16 PROCEDURE — G8427 DOCREV CUR MEDS BY ELIG CLIN: HCPCS | Performed by: NURSE PRACTITIONER

## 2022-05-16 PROCEDURE — 99214 OFFICE O/P EST MOD 30 MIN: CPT | Performed by: NURSE PRACTITIONER

## 2022-05-16 PROCEDURE — 4040F PNEUMOC VAC/ADMIN/RCVD: CPT | Performed by: NURSE PRACTITIONER

## 2022-05-16 ASSESSMENT — SLEEP AND FATIGUE QUESTIONNAIRES
HOW LIKELY ARE YOU TO NOD OFF OR FALL ASLEEP WHILE SITTING QUIETLY AFTER LUNCH WITHOUT ALCOHOL: 0
ESS TOTAL SCORE: 2
HOW LIKELY ARE YOU TO NOD OFF OR FALL ASLEEP WHILE LYING DOWN TO REST IN THE AFTERNOON WHEN CIRCUMSTANCES PERMIT: 1
HOW LIKELY ARE YOU TO NOD OFF OR FALL ASLEEP WHILE SITTING AND TALKING TO SOMEONE: 0
HOW LIKELY ARE YOU TO NOD OFF OR FALL ASLEEP WHILE SITTING INACTIVE IN A PUBLIC PLACE: 0
HOW LIKELY ARE YOU TO NOD OFF OR FALL ASLEEP WHILE SITTING AND READING: 0
HOW LIKELY ARE YOU TO NOD OFF OR FALL ASLEEP IN A CAR, WHILE STOPPED FOR A FEW MINUTES IN TRAFFIC: 0
HOW LIKELY ARE YOU TO NOD OFF OR FALL ASLEEP WHEN YOU ARE A PASSENGER IN A CAR FOR AN HOUR WITHOUT A BREAK: 0
HOW LIKELY ARE YOU TO NOD OFF OR FALL ASLEEP WHILE WATCHING TV: 1

## 2022-05-16 NOTE — ASSESSMENT & PLAN NOTE
Chronic-Stable: Reviewed and analyzed results of physiologic download from patient's machine and reviewed with patient. Supplies and parts as needed for his machine. These are medically necessary. Limit caffeine use after 3pm. Based on the analyzed data will continue with current settings. Stable on his machine at current settings, getting benefit from the use, and having minimal side effects. Will see him back in 1 year or sooner if issues arise.

## 2022-05-16 NOTE — PROGRESS NOTES
[facility] [address], [address] [facility] [phone]   [facility] [address]. [address] [place] [address]. Sheila Lynn [address] [phone]     Video Visit- Follow up    Joao Beltran, was evaluated through a synchronous (real-time) audio-video  encounter. The patient (or guardian if applicable) is aware that this is a billable  service, which includes applicable co-pays. This Virtual Visit was conducted with  patient's (and/or legal guardian's) consent. The visit was conducted pursuant to  the emergency declaration under the [address] [place] [address], [address] [place] [address] [facility] authority and the [employer] and  [employer] General Act. Patient identification was verified,  and a caregiver was present when appropriate. The patient was located in a  state where the provider was licensed to provide care. Assessment/Plan:      1. Obstructive sleep apnea syndrome  Assessment & Plan:   Chronic-Stable: Reviewed and analyzed results of physiologic download from patient's machine and reviewed with patient. Supplies and parts as needed for his machine. These are medically necessary. Limit caffeine use after 3pm. Based on the analyzed data will continue with current settings. Stable on his machine at current settings, getting benefit from the use, and having minimal side effects. Will see him back in 1 year or sooner if issues arise. 2. Coronary artery disease involving native coronary artery of native heart without angina pectoris  Assessment & Plan:   Chronic- Stable. Discussed the importance of treating obstructive sleep apnea as part of the management of this disorder. Cont any meds per PCP and other physicians. 3. Essential hypertension  Assessment & Plan:  Chronic- Stable. Discussed the importance of treating obstructive sleep apnea as part of the management of this disorder.   Cont any meds per PCP and other physicians. 4. Type 2 diabetes mellitus without complication, without long-term current use of insulin ([facility])  Assessment & Plan:  Chronic- Stable. Discussed the importance of treating obstructive sleep apnea as part of the management of this disorder. Cont any meds per PCP and other physicians. 5. Obesity (BMI 30.0-34. 9)  Assessment & Plan:   Chronic-not stable:  Discussed importance of treating obstructive sleep apnea and getting sufficient sleep to assist with weight control. Encouraged him to work on weight loss through diet and exercise. Recommended DASH or Mediterranean diets. Reviewed, analyzed, and documented physiologic data from patient's PAP machine. This information was analyzed to assess complexity and medical decision making in regards to further testing and management. The primary encounter diagnosis was Obstructive sleep apnea syndrome. Diagnoses of Coronary artery disease involving native coronary artery of native heart without angina pectoris, Essential hypertension, Type 2 diabetes mellitus without complication, without long-term current use of insulin ([facility] [address] and Obesity (BMI 30.0-34.9) were also pertinent to this visit. The chronic medical conditions listed are directly related to the primary diagnosis listed above. The management of the primary diagnosis affects the secondary diagnosis and vice versa. Subjective:     Patient ID: Tommy Eisenmenger is a 78 y.o. male. Chief Complaint   Patient presents with    Sleep Apnea     Subjective   HPI:    Machine Modem/Download Info:  Compliance (hours/night): 6.92 hrs/night  % of nights >= 4 hrs: 95.6 %  Download AHI (/hour): 2.1 /HR  Average CPAP Pressure : 13.3 cmH2O      APAP - Settings  Pressure Min: 12 cmH2O  Pressure Max: 20 cmH2O                 Comfort Settings  Humidity Level (0-8): 3  Flex/EPR (0-3): 3 PAP Mask  Mask Type: Nasal pillows     Tommy Eisenmenger reports he is doing well with his machine.  He received his replacement machine for the  recall. The pressure on his machine is comfortable and he is waking rested. He does not use the humidifier on his machine. he denies headaches, congestion, nosebleeds, dryness, aerophagia, or drowsiness while driving. His mask is comfortable and is fitting well. [address]    [place] - Total score: 2    Current Outpatient Medications   Medication Instructions    aspirin EC 81 mg, Oral, DAILY    atorvastatin (LIPITOR) 40 MG tablet TAKE ONE TABLET BY MOUTH DAILY    cephALEXin (KEFLEX) 500 mg, Oral, 3 TIMES DAILY    glimepiride (AMARYL) 2 mg, Oral, 2 TIMES DAILY    glucose blood VI test strips (EXACTECH TEST) strip 1 each, In Vitro, 3 TIMES DAILY, As needed.     glucose monitoring kit (FREESTYLE) monitoring kit 1 kit, Does not apply, DAILY PRN    hydroCHLOROthiazide (HYDRODIURIL) 6.25 mg, Oral, EVERY MORNING, 1/4 tab in the AM     Jardiance 25 mg, Oral, DAILY    Lancets MISC One Touch Verio IQ Pt test TID    metFORMIN (GLUCOPHAGE) 1000 MG tablet TAKE ONE TABLET BY MOUTH TWICE A DAY WITH MEALS    metoprolol succinate (TOPROL XL) 25 mg, Oral, NIGHTLY    ONETOUCH VERIO strip USE ONE STRIP TO TEST DAILY AS NEEDED    Semaglutide (OZEMPIC, 1 MG/DOSE, SC) 1 mg, SubCUTAneous, WEEKLY    sildenafil (VIAGRA) 100 MG tablet TAKE ONE TABLET BY MOUTH  AS NEEDED FOR ERECTILE DYSFUNCTION    tamsulosin (FLOMAX) 0.4 MG capsule TAKE ONE CAPSULE BY MOUTH DAILY        Electronically signed by DARVIN Coleman on 5/16/2022 at 11:10 AM

## 2022-05-16 NOTE — PROGRESS NOTES
Diagnosis: [x] FRANCISCO (G47.33) [] CSA (G47.31) [] Apnea (G47.30)   Length of Need: [x] 15 Months [] 99 Months [] Other:   Machine (ALEXIS!): [x] Respironics Dream Station      Auto 2 [] ResMed AirSense     Auto [] Other:     []  CPAP () [] Bilevel ()   Mode: [] Auto [] Spontaneous    Mode: [] Auto [] Spontaneous              Comfort Settings:      Humidifier: [] Heated ()        [x] Water chamber replacement ()/ 1 per 6 months        Mask:   [x] Nasal () /1 per 3 months [] Full Face () /1 per 3 months   [x] Patient choice -Size and fit mask [] Patient Choice - Size and fit mask   [] Dispense: [] Dispense:   [x] Headgear () / 1 per 3 months [] Headgear () / 1 per 3 months   [] Replacement Nasal Cushion ()/2 per month [] Interface Replacement ()/1 per month   [x] Replacement Nasal Pillows ()/2 per month         Tubing: [x] Heated ()/1 per 3 months    [] Standard ()/1 per 3 months [] Other:           Filters: [x] Non-disposable ()/1 per 6 months     [x] Ultra-Fine, Disposable ()/2 per month        Miscellaneous: [] Chin Strap ()/ 1 per 6 months [] O2 bleed-in:        LPM   [] Oxymetry on CPAP/Bilevel []  Other:         Start Order Date: 05/16/22    MEDICAL JUSTIFICATION:  I, the undersigned, certify that the above prescribed supplies are medically necessary for this patients wellbeing. In my opinion, the supplies are both reasonable and necessary in reference to accepted standards of medicalpractice in treatment of this patients condition. Randy Welch NP    NPI: [id number]       Order Signed Date: 05/16/22  [address] [place] [address]  Pulmonary, Sleep, and Critical Care    Pulmonary, Sleep, and Critical Care  [address] [place] [address].  [facility], [address] [place] [address] , [address] [facility]  Phone: [phone]    Fax: [address]  1945  [address] [place] [address] 3  [phone] (home)   [phone] (mobile)      Insurance Info (confirm with patient if correct):  Payor/Plan Subscr  Sex Relation Sub.  Ins. ID Effective Group Num

## 2022-05-16 NOTE — LETTER
[facility] Sleep Medicine  [id number] [facility]  [address] [address]  Phone: [phone]  Fax: [phone]    May 16, 2022       Patient: Brianna Allen   MR Number: [id number]   YOB: 1945   Date of Visit: 5/16/2022       Maximino Reis was seen for a follow up visit today. Here is my assessment and plan as well as an attached copy of his visit today:    CAD (coronary artery disease)   Chronic- Stable. Discussed the importance of treating obstructive sleep apnea as part of the management of this disorder. Cont any meds per PCP and other physicians. Essential hypertension  Chronic- Stable. Discussed the importance of treating obstructive sleep apnea as part of the management of this disorder. Cont any meds per PCP and other physicians. Obstructive sleep apnea syndrome   Chronic-Stable: Reviewed and analyzed results of physiologic download from patient's machine and reviewed with patient. Supplies and parts as needed for his machine. These are medically necessary. Limit caffeine use after 3pm. Based on the analyzed data will continue with current settings. Stable on his machine at current settings, getting benefit from the use, and having minimal side effects. Will see him back in 1 year or sooner if issues arise. Type 2 diabetes mellitus without complication (HCC)  Chronic- Stable. Discussed the importance of treating obstructive sleep apnea as part of the management of this disorder. Cont any meds per PCP and other physicians. Obesity (BMI 30.0-34. 9)   Chronic-not stable:  Discussed importance of treating obstructive sleep apnea and getting sufficient sleep to assist with weight control. Encouraged him to work on weight loss through diet and exercise. Recommended DASH or Mediterranean diets. If you have questions or concerns, please do not hesitate to call me. I look forward to following Neo Trimble along with you.     Sincerely,    Marlyn Laboy, APRN    CC providers:  Marivel Escalante MD  [address] [address]

## 2022-07-19 LAB
AVERAGE GLUCOSE: NORMAL
CHOLESTEROL, TOTAL: 130 MG/DL
CHOLESTEROL/HDL RATIO: NORMAL
CREATININE URINE POCT: 63
HBA1C MFR BLD: 7.8 %
HDLC SERPL-MCNC: 46 MG/DL (ref 35–70)
LDL CHOLESTEROL CALCULATED: 74 MG/DL (ref 0–160)
MICROALBUMIN/CREAT 24H UR: <3 MG/G{CREAT}
MICROALBUMIN/CREAT UR-RTO: <3
NONHDLC SERPL-MCNC: NORMAL MG/DL
TRIGL SERPL-MCNC: 138 MG/DL
VLDLC SERPL CALC-MCNC: NORMAL MG/DL

## 2022-08-15 ENCOUNTER — OFFICE VISIT (OUTPATIENT)
Dept: FAMILY MEDICINE CLINIC | Age: 77
End: 2022-08-15

## 2022-08-15 VITALS
WEIGHT: 239 LBS | BODY MASS INDEX: 32.41 KG/M2 | DIASTOLIC BLOOD PRESSURE: 78 MMHG | OXYGEN SATURATION: 97 % | SYSTOLIC BLOOD PRESSURE: 118 MMHG | HEART RATE: 72 BPM

## 2022-08-15 DIAGNOSIS — E66.9 OBESITY (BMI 30.0-34.9): ICD-10-CM

## 2022-08-15 DIAGNOSIS — G47.33 OBSTRUCTIVE SLEEP APNEA SYNDROME: Chronic | ICD-10-CM

## 2022-08-15 DIAGNOSIS — I10 ESSENTIAL HYPERTENSION: Chronic | ICD-10-CM

## 2022-08-15 DIAGNOSIS — E11.9 TYPE 2 DIABETES MELLITUS WITHOUT COMPLICATION, WITHOUT LONG-TERM CURRENT USE OF INSULIN (HCC): Primary | ICD-10-CM

## 2022-08-15 DIAGNOSIS — E78.2 MIXED HYPERLIPIDEMIA: Chronic | ICD-10-CM

## 2022-08-15 DIAGNOSIS — I25.10 CORONARY ARTERY DISEASE INVOLVING NATIVE CORONARY ARTERY OF NATIVE HEART WITHOUT ANGINA PECTORIS: Chronic | ICD-10-CM

## 2022-08-15 PROCEDURE — 99214 OFFICE O/P EST MOD 30 MIN: CPT | Performed by: FAMILY MEDICINE

## 2022-08-15 PROCEDURE — 1123F ACP DISCUSS/DSCN MKR DOCD: CPT | Performed by: FAMILY MEDICINE

## 2022-08-15 PROCEDURE — 3051F HG A1C>EQUAL 7.0%<8.0%: CPT | Performed by: FAMILY MEDICINE

## 2022-12-05 NOTE — PROGRESS NOTES
2022    PATIENT: Golden Davis  : 1945    Primary Care Provider:   Mik Lin MD  X:[phone]  O:[phone]    Reason for evaluation:   Chief Complaint   Patient presents with    Coronary Artery Disease     No cardiac symptoms at this time    1 Year Follow Up     History of present illness:   Mr. Golden Davis is a 78 y.o. male patient here today in annual cardiovascular follow up for history of CAD with CABG x 3 (LIMA-LAD, SV-OM2, SV-PDA in 2015), hypertension, and hyperlipidemia. Waylon reports his 2015 anginal presentation as an overwhelming fatigue and denies any concerns or recurrent symptoms since our last visit. He has been attending therapy for PTSD following his time in the [facility].  He was able to do an annual honor flight and present the read the tumor of the unknown in [facility] earlier this year. He is attending physical therapy for his right shoulder and working his way back to the [facility]. Hydrochlorothiazide dosing was reduced following near syncope on 25 mg. Otherwise, he is tolerating medications without adverse effects.     Medical History:      Diagnosis Date    Arthritis     CAD (coronary artery disease)     Cataract     OD    Deviated septum     Erectile dysfunction     Hyperlipidemia     Hypertension     Lumbar herniated disc     Obesity     Post traumatic stress disorder     Retinal detachment     OD    Torn rotator cuff     RT    Type 2 diabetes mellitus without complication (HCC)     Type 2 diabetes mellitus without complication (HCC)     Type 2 diabetes mellitus without complication ([address]     Type II or unspecified type diabetes mellitus without mention of complication, not stated as uncontrolled     Unspecified sleep apnea     uses a CPAP       Surgical History:      Procedure Laterality Date    CARDIAC SURGERY      CATARACT REMOVAL WITH IMPLANT      OD    COLONOSCOPY N/A 3/8/2022 COLONOSCOPY POLYPECTOMY SNARE/COLD BIOPSY performed by Amaury Toscano MD at [address]  2015    Dr. Cha Leal - Urgent X3 (LIMA-LAD, SVG-OM2 & PDA)    LAP BAND  2008    RETINAL DETACHMENT SURGERY      OD    ROTATOR CUFF REPAIR      SINUS SURGERY Left 2017    FESS       Social History:  Social History     Socioeconomic History    Marital status:      Spouse name: Not on file    Number of children: Not on file    Years of education: Not on file    Highest education level: Not on file   Occupational History    Not on file   Tobacco Use    Smoking status: Former     Packs/day: 0.10     Years: 1.00     Pack years: 0.10     Types: Cigarettes     Quit date: 1     Years since quittin.9    Smokeless tobacco: Never   Vaping Use    Vaping Use: Never used   Substance and Sexual Activity    Alcohol use: Yes     Comment: rarely    Drug use: No    Sexual activity: Not on file   Other Topics Concern    Not on file   Social History Narrative    Not on file     Social Determinants of Health     Financial Resource Strain: Low Risk     Difficulty of Paying Living Expenses: Not hard at all   Food Insecurity: No Food Insecurity    Worried About Running Out of Food in the Last Year: Never true    Ran Out of Food in the Last Year: Never true   Transportation Needs: Not on file   Physical Activity: Not on file   Stress: Not on file   Social Connections: Not on file   Intimate Partner Violence: Not on file   Housing Stability: Not on file        Family History:  No evidence for sudden cardiac death or premature CAD. Problem Relation Age of Onset    Cancer Father         liver    Diabetes Father     Stroke Mother     Alzheimer's Disease Mother     Diabetes Brother     Sleep Apnea Brother     Heart Attack Son     Sleep Apnea Brother        Medications:  [x] Medications and dosages reviewed. Prior to Admission medications    Medication Sig Start Date End Date Taking? Authorizing Provider   atorvastatin (LIPITOR) 80 MG tablet Take 80 mg by mouth daily   Yes Historical Provider, MD   telmisartan (MICARDIS) 20 MG tablet Take 1 tablet by mouth daily 12/6/22  Yes Brooke Moya DO   Semaglutide (OZEMPIC, 1 MG/DOSE, SC) Inject 1 mg into the skin once a week   Yes Historical Provider, MD   hydroCHLOROthiazide (HYDRODIURIL) 25 MG tablet Take 6.25 mg by mouth every morning 1/2 tab in the AM   Yes Historical Provider, MD   empagliflozin (JARDIANCE) 25 MG tablet Take 25 mg by mouth daily   Yes Historical Provider, MD   aspirin EC 81 MG EC tablet Take 1 tablet by mouth daily 12/6/21  Yes Brooke Moya DO   metoprolol succinate (TOPROL XL) 25 MG extended release tablet Take 1 tablet by mouth nightly 12/6/21  Yes Brooke Moya DO   tamsulosin (FLOMAX) 0.4 MG capsule TAKE ONE CAPSULE BY MOUTH DAILY  Patient taking differently: 0.4 mg nightly as needed TAKE ONE CAPSULE BY MOUTH DAILY 2/15/21  Yes Denis Pena MD   Lancets MISC One Touch Verio IQ Pt test TID 1/20/21  Yes eDnis Pena MD   ONETOUCH VERIO strip USE ONE STRIP TO TEST DAILY AS NEEDED 1/5/21  Yes Denis Pena MD   sildenafil (VIAGRA) 100 MG tablet TAKE ONE TABLET BY MOUTH  AS NEEDED FOR ERECTILE DYSFUNCTION 12/4/20  Yes Denis Pena MD   metFORMIN (GLUCOPHAGE) 1000 MG tablet TAKE ONE TABLET BY MOUTH TWICE A DAY WITH MEALS 10/26/20  Yes Denis Pena MD   glimepiride (AMARYL) 2 MG tablet Take 1 tablet by mouth 2 times daily  Patient taking differently: Take 2 mg by mouth every morning (before breakfast) 10/22/20  Yes Denis Pena MD   glucose monitoring kit (FREESTYLE) monitoring kit 1 kit by Does not apply route daily as needed 8/17/15  Yes Denis Pena MD   glucose blood VI test strips (EXACTECH TEST) strip 1 each by In Vitro route 3 times daily As needed.  8/17/15  Yes Denis Pena MD       Allergies:  Ace inhibitors     Review of Systems:    [x]Full ROS obtained and negative except as mentioned in HPI    Physical Examination:    /78 (Site: Left Upper Arm, Position: Sitting, Cuff Size: Medium Adult)   Pulse 87   Ht 6' (1.829 m)   Wt 237 lb 12.8 oz (107.9 kg)   SpO2 95%   BMI 32.25 kg/m²   Wt Readings from Last 3 Encounters:   12/06/22 237 lb 12.8 oz (107.9 kg)   08/15/22 239 lb (108.4 kg)   04/27/22 242 lb (109.8 kg)     Vitals:    12/06/22 0855   BP: 134/78   Pulse: 87   SpO2: 95%     GENERAL: Well developed, well nourished, no acute distress  NEUROLOGICAL: Alert and oriented x3  PSYCH: Normal mood and affect   SKIN: Warm and dry  HEENT: Normocephalic, atraumatic, Sclera non-icteric, mucous membranes moist  NECK: supple, JVP normal  CARDIAC: Normal PMI, regular rate and rhythm, normal S1S2, no murmur, rub, or gallop  RESPIRATORY: Normal respiratory effort, clear to auscultation bilaterally  EXTREMITIES: no edema or clubbing, +2 pulses bilaterally   MUSCULOSKELETAL: No joint swelling or tenderness, no chest wall tenderness  GASTROINTESTINAL: normal bowel sounds, soft, non-tender    Imaging:  I have reviewed the below testing personally:    VASCULAR:   Carotids 11/16/18   -The right internal carotid artery appears to have a <50% diameter reducing  stenosis based on velocity criteria. Minimal plaque. -The left internal carotid artery demonstrates no significant stenosis. US for AAA  No evidence of abdominal aortic aneurysm. ABIs 3/26/21  No significant evidence of large vessel arterial occlusive disease of the  lower extremities.      [facility] 8/12/15 (Held)  Codominant  LM- Insignificant plaque noted on IVUS at orifice, mid concentric 50% circumferential plaque noted through bifurcation of LCx on IVUS  LAD- Prox 50% lesion, mid 95% lesion, mid-distal 70% lesion  D2- Ostial 90% lesion  LCx- Prox tubular 50% lesion, AV ostial 99% lesion  OM2- 90% ostial lesion  LT PDA- 95% prox lesion  RCA- Mid 30% lesion  RT PDA- Prox 99% lesion, then area that gives rise to large septal then distal 100% occluded    Collaterals from rPL to distal rPDA  LV Gram 45% EF  Normal EDP  Inferior apical akinesis     SPECT 10/29/18  Summary    Normal LV size and systolic function. Left ventricular ejection fraction of 59%. There is a large sized, moderate intensity, completely fixed defect  inferolaterally, inferoapically. There appears to be mild hypokinesis in  these segments but is also with diaphragmatic attenuation artifact. No evidence of myocardium at risk for significant reversible ischemia. Echo 11/16/18   Summary   -DEFINITY was used to better visualize left ventricle.   -Normal left ventricle size and wall thickness. Ejection fraction is   visually estimated to be 40-45%. Global hypokinesis more marked at the apex. -Mild mitral regurgitation.   -Mild tricuspid regurgitation with RVSP of 30 mmHg. 12/6/2021 TTE  Normal left ventricle size and wall thickness. Left ventricular systolic function is borderline normal with an estimated   ejection fraction of 50%. There is inferolateral/inferoapical hypokinesis. Diastolic filling parameters suggests grade I diastolic dysfunction. Trivial mitral regurgitation. Mild tricuspid regurgitation with PASP estimated at 15 mmHg. The right ventricle is normal in size and function. EKG 12/6/22  Sinus  Rhythm   Diffuse nonspecific T-abnormality. Low voltage  No significant change from prior    Impression/Recommendations    Mr. Wendie Chapin is a 78 y.o. male patient with:    CAD: 2015 CABG x 3- LIMA-LAD, SV-OM2, SV-RPDA (October 2018 SPECT MPI without ischemia)   Ischemic cardiomyopathy, recovered (LVEF 50%)  Hyperlipidemia, controlled (10/2022:  TG 89 LDL 63 HDL 49)  Hypertension, stable   Diabetes mellitus, NID (A1C 10/2022 7.2)  Obesity   Nonobstructive carotid artery disease  FRANCISCO on CPAP  ACEI intolerance   Former tobacco use     Waylon is working through physical therapy for his right shoulder and returning to [employer] routine. He knows to call if any surgical plans or new symptom concerns with activity. Stable ECG in office. Continue:     ASA 81 mg   Atorvastatin 80 mg nightly  Toprol 25 mg nightly   Telmisartan 20 mg daily   HCTZ 6.25 mg    Return in about 1 year (around 12/6/2023). Patient Instructions   Recommend restarting Telmisartan 20 mg daily  This is to help maintain and improve your heart pumping function    Follow up in 1 year with a stress test the same day  Call with questions or concerns     Thank you for allowing me to participate in the care of your patient. Please do not hesitate to call. Rockney Severin, D.O., [facility]  Interventional Cardiology     o: [phone]  [address] [place] [address]., [facility] [address] [facility], [address] [place] [address]    NOTE:  This report was transcribed using voice recognition software. Every effort was made to ensure accuracy; however, inadvertent computerized transcription errors may be present. Scribe's Attestation: This note was scribed in the presence of Dr. Aashish Stanton DO by Luciano Oswald RN.    I, Rockney Severin, have personally performed the services described in this documentation as scribed by Israel Rob RN in my presence, and it is both accurate and complete. An electronic signature was used to authenticate this note.

## 2022-12-06 ENCOUNTER — OFFICE VISIT (OUTPATIENT)
Dept: CARDIOLOGY CLINIC | Age: 77
End: 2022-12-06

## 2022-12-06 VITALS
DIASTOLIC BLOOD PRESSURE: 78 MMHG | HEIGHT: 72 IN | BODY MASS INDEX: 32.21 KG/M2 | WEIGHT: 237.8 LBS | OXYGEN SATURATION: 95 % | SYSTOLIC BLOOD PRESSURE: 134 MMHG | HEART RATE: 87 BPM

## 2022-12-06 DIAGNOSIS — I10 ESSENTIAL HYPERTENSION: Chronic | ICD-10-CM

## 2022-12-06 DIAGNOSIS — I25.10 CORONARY ARTERY DISEASE INVOLVING NATIVE CORONARY ARTERY OF NATIVE HEART WITHOUT ANGINA PECTORIS: Primary | Chronic | ICD-10-CM

## 2022-12-06 DIAGNOSIS — E78.2 MIXED HYPERLIPIDEMIA: Chronic | ICD-10-CM

## 2022-12-06 RX ORDER — TELMISARTAN 20 MG/1
20 TABLET ORAL DAILY
Qty: 30 TABLET | Refills: 3 | Status: SHIPPED | OUTPATIENT
Start: 2022-12-06

## 2022-12-06 RX ORDER — ATORVASTATIN CALCIUM 80 MG/1
80 TABLET, FILM COATED ORAL DAILY
COMMUNITY

## 2022-12-06 NOTE — PATIENT INSTRUCTIONS
Recommend restarting Telmisartan 20 mg daily  This is to help maintain and improve your heart pumping function    Follow up in 1 year with a stress test the same day  Call with questions or concerns

## 2022-12-13 ENCOUNTER — TELEPHONE (OUTPATIENT)
Dept: PULMONOLOGY | Age: 77
End: 2022-12-13

## 2022-12-13 ENCOUNTER — TELEPHONE (OUTPATIENT)
Dept: CARDIOLOGY CLINIC | Age: 77
End: 2022-12-13

## 2022-12-13 NOTE — TELEPHONE ENCOUNTER
Pt asking to speak to [facility] regarding his Telmisartan script. Please call to discuss.

## 2022-12-14 RX ORDER — METOPROLOL SUCCINATE 50 MG/1
50 TABLET, EXTENDED RELEASE ORAL NIGHTLY
Qty: 90 TABLET | Refills: 3 | Status: SHIPPED | OUTPATIENT
Start: 2022-12-14

## 2022-12-14 NOTE — TELEPHONE ENCOUNTER
Per [facility] increase Metoprolol to 50 mg nightly for ischemic cardiomyopathy, recovered (LVEF 50%)    Will fax to Petra Stephens (his pharmacist) at his request

## 2022-12-14 NOTE — TELEPHONE ENCOUNTER
Spoke with Skip- Telmisartan was discontinued due to recurrent hyperkalemia which resolved with the discontinuation of Telmisartan. He reports he tried Lisinopril in the past but was hypotensive with it.

## 2023-03-02 SDOH — HEALTH STABILITY: PHYSICAL HEALTH: ON AVERAGE, HOW MANY MINUTES DO YOU ENGAGE IN EXERCISE AT THIS LEVEL?: 30 MIN

## 2023-03-02 SDOH — HEALTH STABILITY: PHYSICAL HEALTH: ON AVERAGE, HOW MANY DAYS PER WEEK DO YOU ENGAGE IN MODERATE TO STRENUOUS EXERCISE (LIKE A BRISK WALK)?: 2 DAYS

## 2023-03-02 ASSESSMENT — PATIENT HEALTH QUESTIONNAIRE - PHQ9
SUM OF ALL RESPONSES TO PHQ QUESTIONS 1-9: 0
2. FEELING DOWN, DEPRESSED OR HOPELESS: 0
SUM OF ALL RESPONSES TO PHQ QUESTIONS 1-9: 0
SUM OF ALL RESPONSES TO PHQ9 QUESTIONS 1 & 2: 0
SUM OF ALL RESPONSES TO PHQ QUESTIONS 1-9: 0
SUM OF ALL RESPONSES TO PHQ QUESTIONS 1-9: 0
1. LITTLE INTEREST OR PLEASURE IN DOING THINGS: 0

## 2023-03-02 ASSESSMENT — LIFESTYLE VARIABLES
HOW MANY STANDARD DRINKS CONTAINING ALCOHOL DO YOU HAVE ON A TYPICAL DAY: 1 OR 2
HOW MANY STANDARD DRINKS CONTAINING ALCOHOL DO YOU HAVE ON A TYPICAL DAY: 1
HOW OFTEN DO YOU HAVE A DRINK CONTAINING ALCOHOL: MONTHLY OR LESS
HOW OFTEN DO YOU HAVE SIX OR MORE DRINKS ON ONE OCCASION: 1
HOW OFTEN DO YOU HAVE A DRINK CONTAINING ALCOHOL: 2

## 2023-03-06 ENCOUNTER — OFFICE VISIT (OUTPATIENT)
Dept: FAMILY MEDICINE CLINIC | Age: 78
End: 2023-03-06

## 2023-03-06 VITALS
WEIGHT: 236 LBS | BODY MASS INDEX: 31.28 KG/M2 | HEIGHT: 73 IN | SYSTOLIC BLOOD PRESSURE: 118 MMHG | HEART RATE: 77 BPM | DIASTOLIC BLOOD PRESSURE: 80 MMHG | OXYGEN SATURATION: 95 %

## 2023-03-06 DIAGNOSIS — E78.2 MIXED HYPERLIPIDEMIA: Chronic | ICD-10-CM

## 2023-03-06 DIAGNOSIS — G47.33 OBSTRUCTIVE SLEEP APNEA SYNDROME: Chronic | ICD-10-CM

## 2023-03-06 DIAGNOSIS — Z12.5 PROSTATE CANCER SCREENING: ICD-10-CM

## 2023-03-06 DIAGNOSIS — F43.10 PTSD (POST-TRAUMATIC STRESS DISORDER): ICD-10-CM

## 2023-03-06 DIAGNOSIS — Z00.00 MEDICARE ANNUAL WELLNESS VISIT, SUBSEQUENT: ICD-10-CM

## 2023-03-06 DIAGNOSIS — E66.9 OBESITY (BMI 30.0-34.9): ICD-10-CM

## 2023-03-06 DIAGNOSIS — R53.83 FATIGUE, UNSPECIFIED TYPE: ICD-10-CM

## 2023-03-06 DIAGNOSIS — I10 ESSENTIAL HYPERTENSION: Chronic | ICD-10-CM

## 2023-03-06 DIAGNOSIS — M19.049 CMC ARTHRITIS: ICD-10-CM

## 2023-03-06 DIAGNOSIS — E11.9 TYPE 2 DIABETES MELLITUS WITHOUT COMPLICATION, WITHOUT LONG-TERM CURRENT USE OF INSULIN (HCC): Primary | ICD-10-CM

## 2023-03-06 LAB
A/G RATIO: 1.8 (ref 1.1–2.2)
ALBUMIN SERPL-MCNC: 4.4 G/DL (ref 3.4–5)
ALP BLD-CCNC: 51 U/L (ref 40–129)
ALT SERPL-CCNC: 20 U/L (ref 10–40)
ANION GAP SERPL CALCULATED.3IONS-SCNC: 13 MMOL/L (ref 3–16)
AST SERPL-CCNC: 18 U/L (ref 15–37)
BASOPHILS ABSOLUTE: 0 K/UL (ref 0–0.2)
BASOPHILS RELATIVE PERCENT: 0.4 %
BILIRUB SERPL-MCNC: 0.6 MG/DL (ref 0–1)
BUN BLDV-MCNC: 19 MG/DL (ref 7–20)
CALCIUM SERPL-MCNC: 9.5 MG/DL (ref 8.3–10.6)
CHLORIDE BLD-SCNC: 100 MMOL/L (ref 99–110)
CHOLESTEROL, FASTING: 120 MG/DL (ref 0–199)
CO2: 24 MMOL/L (ref 21–32)
CREAT SERPL-MCNC: 1 MG/DL (ref 0.8–1.3)
EOSINOPHILS ABSOLUTE: 0.1 K/UL (ref 0–0.6)
EOSINOPHILS RELATIVE PERCENT: 1.3 %
GFR SERPL CREATININE-BSD FRML MDRD: >60 ML/MIN/{1.73_M2}
GLUCOSE FASTING: 112 MG/DL (ref 70–99)
HBA1C MFR BLD: 7.4 %
HCT VFR BLD CALC: 43.9 % (ref 40.5–52.5)
HDLC SERPL-MCNC: 42 MG/DL (ref 40–60)
HEMOGLOBIN: 14.6 G/DL (ref 13.5–17.5)
LDL CHOLESTEROL CALCULATED: 44 MG/DL
LYMPHOCYTES ABSOLUTE: 1.8 K/UL (ref 1–5.1)
LYMPHOCYTES RELATIVE PERCENT: 34.3 %
MCH RBC QN AUTO: 30.9 PG (ref 26–34)
MCHC RBC AUTO-ENTMCNC: 33.3 G/DL (ref 31–36)
MCV RBC AUTO: 92.7 FL (ref 80–100)
MONOCYTES ABSOLUTE: 0.4 K/UL (ref 0–1.3)
MONOCYTES RELATIVE PERCENT: 8 %
NEUTROPHILS ABSOLUTE: 2.9 K/UL (ref 1.7–7.7)
NEUTROPHILS RELATIVE PERCENT: 56 %
PDW BLD-RTO: 13.2 % (ref 12.4–15.4)
PLATELET # BLD: 205 K/UL (ref 135–450)
PMV BLD AUTO: 9.5 FL (ref 5–10.5)
POTASSIUM SERPL-SCNC: 4.3 MMOL/L (ref 3.5–5.1)
PROSTATE SPECIFIC ANTIGEN: 0.34 NG/ML (ref 0–4)
RBC # BLD: 4.74 M/UL (ref 4.2–5.9)
SODIUM BLD-SCNC: 137 MMOL/L (ref 136–145)
TOTAL PROTEIN: 6.9 G/DL (ref 6.4–8.2)
TRIGLYCERIDE, FASTING: 169 MG/DL (ref 0–150)
TSH REFLEX: 2.29 UIU/ML (ref 0.27–4.2)
VLDLC SERPL CALC-MCNC: 34 MG/DL
WBC # BLD: 5.2 K/UL (ref 4–11)

## 2023-03-06 SDOH — ECONOMIC STABILITY: FOOD INSECURITY: WITHIN THE PAST 12 MONTHS, THE FOOD YOU BOUGHT JUST DIDN'T LAST AND YOU DIDN'T HAVE MONEY TO GET MORE.: NEVER TRUE

## 2023-03-06 SDOH — ECONOMIC STABILITY: INCOME INSECURITY: HOW HARD IS IT FOR YOU TO PAY FOR THE VERY BASICS LIKE FOOD, HOUSING, MEDICAL CARE, AND HEATING?: NOT HARD AT ALL

## 2023-03-06 SDOH — ECONOMIC STABILITY: HOUSING INSECURITY
IN THE LAST 12 MONTHS, WAS THERE A TIME WHEN YOU DID NOT HAVE A STEADY PLACE TO SLEEP OR SLEPT IN A SHELTER (INCLUDING NOW)?: NO

## 2023-03-06 SDOH — ECONOMIC STABILITY: FOOD INSECURITY: WITHIN THE PAST 12 MONTHS, YOU WORRIED THAT YOUR FOOD WOULD RUN OUT BEFORE YOU GOT MONEY TO BUY MORE.: NEVER TRUE

## 2023-03-06 NOTE — PROGRESS NOTES
Medicare Annual Wellness Visit    Jimmy Eldon is here for Medicare AWV    Assessment & Plan   Type 2 diabetes mellitus without complication, without long-term current use of insulin (HCC)  -     POCT glycosylated hemoglobin (Hb A1C)  Medicare annual wellness visit, subsequent      Recommendations for Preventive Services Due: see orders and patient instructions/AVS.  Recommended screening schedule for the next 5-10 years is provided to the patient in written form: see Patient Instructions/AVS.     Return for Medicare Annual Wellness Visit in 1 year. Subjective     Working with [address] [facility] counselor anthony BISHPO  Went on honor flight as a vet    Patient's complete [address] and screening values have been reviewed and are found in [address] [facility]. The following problems were reviewed today and where indicated follow up appointments were made and/or referrals ordered. Positive Risk Factor Screenings with Interventions:                 Weight and Activity:  Physical Activity: Insufficiently Active    Days of Exercise per Week: 2 days    Minutes of Exercise per Session: 30 min     On average, how many days per week do you engage in moderate to strenuous exercise (like a brisk walk)?: 2 days  Have you lost any weight without trying in the past 3 months?: No  Body mass index: (!) 31.13    Obesity Interventions:  Patient declines any further evaluation or treatment               Advanced Directives:  Do you have a Living Will?: (!) No    Intervention:                         Objective   Vitals:    03/06/23 0900   BP: 118/80   Pulse: 77   SpO2: 95%   Weight: 236 lb (107 kg)   Height: 6' 1\" (1.854 m)      Body mass index is 31.14 kg/m².         General Appearance: alert and oriented to person, place and time, well-developed and well-nourished, in no acute distress  Neck: neck supple and non tender without mass, no thyromegaly or thyroid nodules, no cervical lymphadenopathy   Pulmonary/Chest: clear to auscultation bilaterally- no wheezes, rales or rhonchi, normal air movement, no respiratory distress  Cardiovascular: normal rate, normal S1 and S2, no gallops, intact distal pulses, and no carotid bruits  Abdomen: soft, non-tender, non-distended, normal bowel sounds, no masses or organomegaly  Extremities: no cyanosis and no clubbing       Allergies   Allergen Reactions    Ace Inhibitors      hyperkalemia     Prior to Visit Medications    Medication Sig Taking?  Authorizing Provider   metoprolol succinate (TOPROL XL) 50 MG extended release tablet Take 1 tablet by mouth nightly Yes Obed Parks (Sbuvetoya), DO   atorvastatin (LIPITOR) 80 MG tablet Take 80 mg by mouth daily Yes Historical Provider, MD   Semaglutide (OZEMPIC, 1 MG/DOSE, SC) Inject 1 mg into the skin once a week Yes Historical Provider, MD   hydroCHLOROthiazide (MICROZIDE) 12.5 MG capsule Take 12.5 mg by mouth every morning 1/2 tab in the AM Yes Historical Provider, MD   empagliflozin (JARDIANCE) 25 MG tablet Take 25 mg by mouth daily Yes Historical Provider, MD   aspirin EC 81 MG EC tablet Take 1 tablet by mouth daily Yes Obed Parks (Sbuvetoya), DO   tamsulosin (FLOMAX) 0.4 MG capsule TAKE ONE CAPSULE BY MOUTH DAILY  Patient taking differently: 0.4 mg nightly as needed TAKE ONE CAPSULE BY MOUTH DAILY Yes Liliana Flores MD   Lancets MISC One Touch Verio IQ Pt test TID Yes Liliana Flores MD   ONETOUCH VERIO strip USE ONE STRIP TO TEST DAILY AS NEEDED Yes Liliana Flores MD   sildenafil (VIAGRA) 100 MG tablet TAKE ONE TABLET BY MOUTH  AS NEEDED FOR ERECTILE DYSFUNCTION Yes Liliana Flores MD   metFORMIN (GLUCOPHAGE) 1000 MG tablet TAKE ONE TABLET BY MOUTH TWICE A DAY WITH MEALS Yes Liliana Flores MD   glimepiride (AMARYL) 2 MG tablet Take 1 tablet by mouth 2 times daily  Patient taking differently: Take 4 mg by mouth every morning (before breakfast) Yes Liliana Flores MD   glucose monitoring kit (FREESTYLE) monitoring kit 1 kit by Does not apply route daily as needed Yes Damian Moran MD   glucose blood VI test strips (EXACTECH TEST) strip 1 each by In Vitro route 3 times daily As needed. Yes Damian Moran MD       [facility] (Including outside providers/suppliers regularly involved in providing care):   Patient Care Team:  Damian Moran MD as PCP - General (Family Medicine)  [facility]  Shailesh Velasco MD as Consulting Physician (Sleep Medicine Family Practice)  Tiffanie R Kehrt, APRN - CNP as Nurse Practitioner (Nurse Practitioner)  JEFFERYI - Pedro Pablo Ambrocio - derm  [facility] - PCP  Cardio - Preeti  Gastro - Maria Isabel Mccormick - PM and R/nereida Fagan - dentist  psychologist   Reviewed and updated this visit:  Tobacco  Allergies  Meds  Med Hx  Surg Hx  Soc Hx  Fam Hx      Encounter Diagnoses   Name Primary?    Type 2 diabetes mellitus without complication, without long-term current use of insulin (HCC) Yes    Medicare annual wellness visit, subsequent     Essential hypertension     Mixed hyperlipidemia     Obstructive sleep apnea syndrome     CMC arthritis     Obesity (BMI 30.0-34.9)     [facility]     A1c - 7.4  Damian Moran MD   Galileo is exercising 150 minutes of brisk walking per week or the equivalent  Labs today  Covid booster  tdap  F/u with psychologist

## 2023-03-06 NOTE — PATIENT INSTRUCTIONS
Advance Directives: Care Instructions  Overview  An advance directive is a legal way to state your wishes at the end of your life. It tells your family and your doctor what to do if you can't say what you want. There are two main types of advance directives. You can change them any time your wishes change. Living will. This form tells your family and your doctor your wishes about life support and other treatment. The form is also called a declaration. Medical power of . This form lets you name a person to make treatment decisions for you when you can't speak for yourself. This person is called a health care agent (health care proxy, health care surrogate). The form is also called a durable power of  for health care. If you do not have an advance directive, decisions about your medical care may be made by a family member, or by a doctor or a  who doesn't know you. It may help to think of an advance directive as a gift to the people who care for you. If you have one, they won't have to make tough decisions by themselves. For more information, including forms for your state, see the [address] website (www.caringinfo.org/planning/advance-directives/). Follow-up care is a key part of your treatment and safety. Be sure to make and go to all appointments, and call your doctor if you are having problems. It's also a good idea to know your test results and keep a list of the medicines you take. What should you include in an advance directive? Many states have a unique advance directive form. (It may ask you to address specific issues.) Or you might use a universal form that's approved by many states. If your form doesn't tell you what to address, it may be hard to know what to include in your advance directive. Use the questions below to help you get started. Who do you want to make decisions about your medical care if you are not able to?   What life-support measures do you want if you have a serious illness that gets worse over time or can't be cured? What are you most afraid of that might happen? (Maybe you're afraid of having pain, losing your independence, or being kept alive by machines.)  Where would you prefer to die? (Your home? A hospital? A nursing home?)  Do you want to donate your organs when you die? Do you want certain [employer] practices performed before you die? When should you call for help? Be sure to contact your doctor if you have any questions. Where can you learn more? Go to http://www.schmitz.com/ and enter R264 to learn more about \"Advance Directives: Care Instructions. \"  Current as of: June 16, 2022               Content Version: 13.5  © 8024-3780 Healthwise, [employer]. Care instructions adapted under license by [facility] ([facility]). If you have questions about a medical condition or this instruction, always ask your healthcare professional. [place] [address] any warranty or liability for your use of this information. Starting a Weight Loss Plan: Care Instructions  Overview     If you're thinking about losing weight, it can be hard to know where to start. Your doctor can help you set up a weight loss plan that best meets your needs. You may want to take a class on nutrition or exercise, or you could join a weight loss support group. If you have questions about how to make changes to your eating or exercise habits, ask your doctor about seeing a registered dietitian or an exercise specialist.  It can be a big challenge to lose weight. But you don't have to make huge changes at once. Make small changes, and stick with them. When those changes become habit, add a few more changes. If you don't think you're ready to make changes right now, try to pick a date in the future. Make an appointment to see your doctor to discuss whether the time is right for you to start a plan. Follow-up care is a key part of your treatment and safety.  Be sure to make and go to all appointments, and call your doctor if you are having problems. It's also a good idea to know your test results and keep a list of the medicines you take. How can you care for yourself at home? Set realistic goals. Many people expect to lose much more weight than is likely. A weight loss of 5% to 10% of your body weight may be enough to improve your health. Get family and friends involved to provide support. Talk to them about why you are trying to lose weight, and ask them to help. They can help by participating in exercise and having meals with you, even if they may be eating something different. Find what works best for you. If you do not have time or do not like to cook, a program that offers meal replacement bars or shakes may be better for you. Or if you like to prepare meals, finding a plan that includes daily menus and recipes may be best.  Ask your doctor about other health professionals who can help you achieve your weight loss goals. A dietitian can help you make healthy changes in your diet. An exercise specialist or  can help you develop a safe and effective exercise program.  A counselor or psychiatrist can help you cope with issues such as depression, anxiety, or family problems that can make it hard to focus on weight loss. Consider joining a support group for people who are trying to lose weight. Your doctor can suggest groups in your area. Where can you learn more? Go to http://www.woods.com/ and enter U357 to learn more about \"Starting a Weight Loss Plan: Care Instructions. \"  Current as of: August 25, 2022               Content Version: 13.5  © 2006-2022 Healthwise, Incorporated. Care instructions adapted under license by [facility] ([facility]).  If you have questions about a medical condition or this instruction, always ask your healthcare professional. [address] any warranty or liability for your use of this information. A Healthy Heart: Care Instructions  Your Care Instructions     Coronary artery disease, also called heart disease, occurs when a substance called plaque builds up in the vessels that supply oxygen-rich blood to your heart muscle. This can narrow the blood vessels and reduce blood flow. A heart attack happens when blood flow is completely blocked. A high-fat diet, smoking, and other factors increase the risk of heart disease. Your doctor has found that you have a chance of having heart disease. You can do lots of things to keep your heart healthy. It may not be easy, but you can change your diet, exercise more, and quit smoking. These steps really work to lower your chance of heart disease. Follow-up care is a key part of your treatment and safety. Be sure to make and go to all appointments, and call your doctor if you are having problems. It's also a good idea to know your test results and keep a list of the medicines you take. How can you care for yourself at home? Diet    Use less salt when you cook and eat. This helps lower your blood pressure. Taste food before salting. Add only a little salt when you think you need it. With time, your taste buds will adjust to less salt.     Eat fewer snack items, fast foods, canned soups, and other high-salt, high-fat, processed foods.     Read food labels and try to avoid saturated and trans fats. They increase your risk of heart disease by raising cholesterol levels.     Limit the amount of solid fat-butter, margarine, and shortening-you eat. Use olive, peanut, or canola oil when you cook. Bake, broil, and steam foods instead of frying them.     Eat a variety of fruit and vegetables every day. Dark green, deep orange, red, or yellow fruits and vegetables are especially good for you. Examples include spinach, carrots, peaches, and berries.     Foods high in fiber can reduce your cholesterol and provide important vitamins and minerals.  High-fiber foods include whole-grain cereals and breads, oatmeal, beans, brown rice, citrus fruits, and apples.     Eat lean proteins. Heart-healthy proteins include seafood, lean meats and poultry, eggs, beans, peas, nuts, seeds, and soy products.     Limit drinks and foods with added sugar. These include candy, desserts, and soda pop. Lifestyle changes    If your doctor recommends it, get more exercise. Walking is a good choice. Bit by bit, increase the amount you walk every day. Try for at least 30 minutes on most days of the week. You also may want to swim, bike, or do other activities.     Do not smoke. If you need help quitting, talk to your doctor about stop-smoking programs and medicines. These can increase your chances of quitting for good. Quitting smoking may be the most important step you can take to protect your heart. It is never too late to quit.     Limit alcohol to 2 drinks a day for men and 1 drink a day for women. Too much alcohol can cause health problems.     Manage other health problems such as diabetes, high blood pressure, and high cholesterol. If you think you may have a problem with alcohol or drug use, talk to your doctor. Medicines    Take your medicines exactly as prescribed. Call your doctor if you think you are having a problem with your medicine.     If your doctor recommends aspirin, take the amount directed each day. Make sure you take aspirin and not another kind of pain reliever, such as acetaminophen (Tylenol). When should you call for help? Call 911 if you have symptoms of a heart attack. These may include:    Chest pain or pressure, or a strange feeling in the chest.     Sweating.     Shortness of breath.     Pain, pressure, or a strange feeling in the back, neck, jaw, or upper belly or in one or both shoulders or arms.     Lightheadedness or sudden weakness.     A fast or irregular heartbeat.    After you call 911, the  may tell you to chew 1 adult-strength or 2 to 4 low-dose aspirin. Wait for an ambulance. Do not try to drive yourself. Watch closely for changes in your health, and be sure to contact your doctor if you have any problems. Where can you learn more? Go to http://www.schmitz.com/ and enter F075 to learn more about \"A Healthy Heart: Care Instructions. \"  Current as of: September 7, 2022               Content Version: 13.5  © 2006-2022 [email or web address]. Care instructions adapted under license by [facility] ([facility]). If you have questions about a medical condition or this instruction, always ask your healthcare professional. [place] [address] any warranty or liability for your use of this information. Personalized Preventive Plan for [facility] - 3/6/2023  Medicare offers a range of preventive health benefits. Some of the tests and screenings are paid in full while other may be subject to a deductible, co-insurance, and/or copay. Some of these benefits include a comprehensive review of your medical history including lifestyle, illnesses that may run in your family, and various assessments and screenings as appropriate. After reviewing your medical record and screening and assessments performed today your provider may have ordered immunizations, labs, imaging, and/or referrals for you. A list of these orders (if applicable) as well as your Preventive Care list are included within your After Visit Summary for your review. Other Preventive Recommendations:    A preventive eye exam performed by an eye specialist is recommended every 1-2 years to screen for glaucoma; cataracts, macular degeneration, and other eye disorders. A preventive dental visit is recommended every 6 months. Try to get at least 150 minutes of exercise per week or 10,000 steps per day on a pedometer . Order or download the FREE \"Exercise & Physical Activity: Your Everyday Guide\" from [employer].  Call 2-550.147.4646 or search [employer] online. You need 7921-3817 mg of calcium and 7563-2579 IU of vitamin D per day. It is possible to meet your calcium requirement with diet alone, but a vitamin D supplement is usually necessary to meet this goal.  When exposed to the sun, use a sunscreen that protects against both UVA and UVB radiation with an SPF of 30 or greater. Reapply every 2 to 3 hours or after sweating, drying off with a towel, or swimming. Always wear a seat belt when traveling in a car. Always wear a helmet when riding a bicycle or motorcycle.

## 2023-03-17 ENCOUNTER — TELEPHONE (OUTPATIENT)
Dept: CARDIOLOGY CLINIC | Age: 78
End: 2023-03-17

## 2023-03-17 DIAGNOSIS — I25.10 CORONARY ARTERY DISEASE INVOLVING NATIVE CORONARY ARTERY OF NATIVE HEART WITHOUT ANGINA PECTORIS: Primary | ICD-10-CM

## 2023-03-17 NOTE — TELEPHONE ENCOUNTER
Patient not due for myoview stress test and ov until December 2023. Order for Lila Small has expected date 6/6/23. Please change order due date. Thanks.   caron

## 2023-03-17 NOTE — TELEPHONE ENCOUNTER
Pt called left message in [facility] voicemail they needs to schedule 1 year appt with [place] [facility] and stress test     Pls advise thank you

## 2023-03-20 NOTE — TELEPHONE ENCOUNTER
Done  Please schedule to see [facility] in December with stress test the same day

## 2023-03-24 NOTE — TELEPHONE ENCOUNTER
Central scheduling is not scheduling the myoview stress test beyond 9/30/23. Will leave on the recall list and call back. Left message on patient's vm that we will call at a later time to schedule.   caron

## 2023-04-19 ENCOUNTER — TELEPHONE (OUTPATIENT)
Dept: PULMONOLOGY | Age: 78
End: 2023-04-19

## 2023-04-19 NOTE — TELEPHONE ENCOUNTER
Pt called in stating he wanted to speak to Waylon Cherry and ask her a few questions. Pt states he wants to know if we have records of his sleep studies and what the dates were for those. Pt also states he wants to know/get a letter stating that tinnitus and sleep apnea might be or might not be linked since he deals with both. Please advise. Pt wants phone call at either number on file.     Ph. [phone]

## 2023-04-19 NOTE — TELEPHONE ENCOUNTER
Called patient and gave him the dates from Dr. Christensen Peconic notes. Patient has put together a sample letter related to the [place] diagnosing him with tinnitus related to [email or web address]. He feels the tinnitus brought on his sleep apnea. Happy to come in for a visit if he needs to. Waiting for him to fax the letter over for Hu Valdovinos to review.

## 2023-04-19 NOTE — TELEPHONE ENCOUNTER
Pt states he cant fax it from home he was having issues with it. Pt states he will drop it off to me here in [place] today if he can.     Ph. [phone]

## 2023-05-22 ENCOUNTER — TELEMEDICINE (OUTPATIENT)
Dept: PULMONOLOGY | Age: 78
End: 2023-05-22
Payer: MEDICARE

## 2023-05-22 DIAGNOSIS — E11.9 TYPE 2 DIABETES MELLITUS WITHOUT COMPLICATION, WITHOUT LONG-TERM CURRENT USE OF INSULIN (HCC): Chronic | ICD-10-CM

## 2023-05-22 DIAGNOSIS — I10 ESSENTIAL HYPERTENSION: Chronic | ICD-10-CM

## 2023-05-22 DIAGNOSIS — E66.9 OBESITY (BMI 30.0-34.9): ICD-10-CM

## 2023-05-22 DIAGNOSIS — G47.33 OBSTRUCTIVE SLEEP APNEA SYNDROME: Primary | Chronic | ICD-10-CM

## 2023-05-22 PROCEDURE — 1123F ACP DISCUSS/DSCN MKR DOCD: CPT | Performed by: NURSE PRACTITIONER

## 2023-05-22 PROCEDURE — 99214 OFFICE O/P EST MOD 30 MIN: CPT | Performed by: NURSE PRACTITIONER

## 2023-05-22 PROCEDURE — 3051F HG A1C>EQUAL 7.0%<8.0%: CPT | Performed by: NURSE PRACTITIONER

## 2023-05-22 ASSESSMENT — SLEEP AND FATIGUE QUESTIONNAIRES
HOW LIKELY ARE YOU TO NOD OFF OR FALL ASLEEP WHILE SITTING QUIETLY AFTER LUNCH WITHOUT ALCOHOL: 0
ESS TOTAL SCORE: 5
HOW LIKELY ARE YOU TO NOD OFF OR FALL ASLEEP WHILE LYING DOWN TO REST IN THE AFTERNOON WHEN CIRCUMSTANCES PERMIT: 1
HOW LIKELY ARE YOU TO NOD OFF OR FALL ASLEEP IN A CAR, WHILE STOPPED FOR A FEW MINUTES IN TRAFFIC: 0
HOW LIKELY ARE YOU TO NOD OFF OR FALL ASLEEP WHILE SITTING AND READING: 1
HOW LIKELY ARE YOU TO NOD OFF OR FALL ASLEEP WHILE SITTING AND TALKING TO SOMEONE: WOULD NEVER DOZE
HOW LIKELY ARE YOU TO NOD OFF OR FALL ASLEEP WHILE SITTING QUIETLY AFTER LUNCH WITHOUT ALCOHOL: WOULD NEVER DOZE
HOW LIKELY ARE YOU TO NOD OFF OR FALL ASLEEP WHILE LYING DOWN TO REST IN THE AFTERNOON WHEN CIRCUMSTANCES PERMIT: SLIGHT CHANCE OF DOZING
HOW LIKELY ARE YOU TO NOD OFF OR FALL ASLEEP WHILE WATCHING TV: SLIGHT CHANCE OF DOZING
HOW LIKELY ARE YOU TO NOD OFF OR FALL ASLEEP WHILE SITTING INACTIVE IN A PUBLIC PLACE: 1
HOW LIKELY ARE YOU TO NOD OFF OR FALL ASLEEP WHILE SITTING INACTIVE IN A PUBLIC PLACE: SLIGHT CHANCE OF DOZING
HOW LIKELY ARE YOU TO NOD OFF OR FALL ASLEEP WHILE SITTING AND READING: SLIGHT CHANCE OF DOZING
HOW LIKELY ARE YOU TO NOD OFF OR FALL ASLEEP WHEN YOU ARE A PASSENGER IN A CAR FOR AN HOUR WITHOUT A BREAK: SLIGHT CHANCE OF DOZING
HOW LIKELY ARE YOU TO NOD OFF OR FALL ASLEEP WHILE SITTING AND TALKING TO SOMEONE: 0
HOW LIKELY ARE YOU TO NOD OFF OR FALL ASLEEP WHEN YOU ARE A PASSENGER IN A CAR FOR AN HOUR WITHOUT A BREAK: 1
HOW LIKELY ARE YOU TO NOD OFF OR FALL ASLEEP WHILE WATCHING TV: 1
HOW LIKELY ARE YOU TO NOD OFF OR FALL ASLEEP IN A CAR, WHILE STOPPED FOR A FEW MINUTES IN TRAFFIC: WOULD NEVER DOZE

## 2023-05-22 NOTE — PROGRESS NOTES
Neptali Stubbs CNP  Romananegrito Ace CNP [address] [facility], [address] [facility] [phone]   [address] [place] [address] [phone]     Video Visit- Follow up      Assessment/Plan:      1. Obstructive sleep apnea syndrome  Assessment & Plan:   Chronic-Stable: Reviewed and analyzed results of physiologic download from patient's machine and reviewed with patient. Supplies and parts as needed for his machine. These are medically necessary. Limit caffeine use after 3pm. Based on the analyzed data will continue with current settings. Stable on his machine at current settings, getting benefit from the use, and having minimal side effects. Will see him back in 1 year. Encouraged him to contact the office with any questions or concerns. 2. Essential hypertension  Assessment & Plan:   Chronic- Stable. Discussed the importance of treating obstructive sleep apnea as part of the management of this disorder. Cont any meds per PCP and other physicians. 3. Type 2 diabetes mellitus without complication, without long-term current use of insulin (HCC)  Assessment & Plan:  Chronic- Stable. Discussed the importance of treating obstructive sleep apnea as part of the management of this disorder. Cont any meds per PCP and other physicians. 4. Obesity (BMI 30.0-34. 9)  Assessment & Plan:   Chronic-not stable:  Discussed importance of treating obstructive sleep apnea and getting sufficient sleep to assist with weight control. Encouraged him to work on weight loss through diet and exercise. Recommended DASH or Mediterranean diets. Reviewed, analyzed, and documented physiologic data from patient's PAP machine. This information was analyzed to assess complexity and medical decision making in regards to further testing and management. The primary encounter diagnosis was Obstructive sleep apnea syndrome.  Diagnoses of Essential

## 2023-05-22 NOTE — PROGRESS NOTES
Diagnosis: [x] FRANCISCO (G47.33) [] CSA (G47.31) [] Apnea (G47.30)   Length of Need: [x] 15 Months [] 99 Months [] Other:   Machine (ALEXIS!): [] Respironics Dream Station      Auto [] ResMed AirSense     Auto [] Other:     []  CPAP () [] Bilevel ()   Mode: [] Auto [] Spontaneous    Mode: [] Auto [] Spontaneous              Comfort Settings:      Humidifier: [] Heated ()        [x] Water chamber replacement ()/ 1 per 6 months        Mask:   [x] Nasal () /1 per 3 months [] Full Face () /1 per 3 months   [x] Patient choice -Size and fit mask [] Patient Choice - Size and fit mask   [] Dispense: [] Dispense:   [x] Headgear () / 1 per 3 months [] Headgear () / 1 per 3 months   [] Replacement Nasal Cushion ()/2 per month [] Interface Replacement ()/1 per month   [x] Replacement Nasal Pillows ()/2 per month         Tubing: [x] Heated ()/1 per 3 months    [] Standard ()/1 per 3 months [] Other:           Filters: [x] Non-disposable ()/1 per 6 months     [x] Ultra-Fine, Disposable ()/2 per month        Miscellaneous: [] Chin Strap ()/ 1 per 6 months [] O2 bleed-in:        LPM   [] Oxymetry on CPAP/Bilevel []  Other:         Start Order Date: 05/22/23    MEDICAL JUSTIFICATION:  I, the undersigned, certify that the above prescribed supplies are medically necessary for this patients wellbeing. In my opinion, the supplies are both reasonable and necessary in reference to accepted standards of medicalpractice in treatment of this patients condition. Reilly Crews NP    NPI: [id number]       Order Signed Date: 05/22/23  [facility]  Pulmonary, Sleep, and Critical Care    Pulmonary, Sleep, and Critical Care  [facility] [address] [place] [address].  [facility], [facility] , [facility]  Phone: [phone]    Fax:

## 2023-06-20 ENCOUNTER — TELEPHONE (OUTPATIENT)
Dept: CARDIOLOGY CLINIC | Age: 78
End: 2023-06-20

## 2023-06-20 NOTE — TELEPHONE ENCOUNTER
Pt came in and scheduled [employer] Stress and appt w/[facility] for Dec.  Can a new order for the stress test please be put in, or other order updated? It expires prior to his appt in Dec.  Please advise. Thank you.

## 2023-08-25 ENCOUNTER — OFFICE VISIT (OUTPATIENT)
Dept: FAMILY MEDICINE CLINIC | Age: 78
End: 2023-08-25

## 2023-08-25 VITALS
SYSTOLIC BLOOD PRESSURE: 116 MMHG | DIASTOLIC BLOOD PRESSURE: 64 MMHG | BODY MASS INDEX: 31.69 KG/M2 | WEIGHT: 234 LBS | HEIGHT: 72 IN | OXYGEN SATURATION: 96 % | HEART RATE: 81 BPM

## 2023-08-25 DIAGNOSIS — I25.10 CORONARY ARTERY DISEASE INVOLVING NATIVE CORONARY ARTERY OF NATIVE HEART WITHOUT ANGINA PECTORIS: Chronic | ICD-10-CM

## 2023-08-25 DIAGNOSIS — G47.33 OBSTRUCTIVE SLEEP APNEA SYNDROME: Chronic | ICD-10-CM

## 2023-08-25 DIAGNOSIS — E78.2 MIXED HYPERLIPIDEMIA: Chronic | ICD-10-CM

## 2023-08-25 DIAGNOSIS — I10 ESSENTIAL HYPERTENSION: Chronic | ICD-10-CM

## 2023-08-25 DIAGNOSIS — E11.9 TYPE 2 DIABETES MELLITUS WITHOUT COMPLICATION, WITHOUT LONG-TERM CURRENT USE OF INSULIN (HCC): Primary | Chronic | ICD-10-CM

## 2023-08-25 DIAGNOSIS — C44.90 SKIN CANCER: ICD-10-CM

## 2023-08-25 NOTE — PROGRESS NOTES
Viviana Singh is a 78 y.o. male. HPI:here for  complex medical visit  [facility]  [facility] face  [facility]  2014 started to have thoughts c/w TSD, started counseling in 2019, still seeing therapist at [address] [place] [address], vitamins and allergies reviewed with pt    ROS: No TIA's or unusual headaches, no dysphagia. No prolonged cough. No dyspnea or chest pain on exertion. No abdominal pain, change in bowel habits, black or bloody stools. No urinary tract symptoms. No new or unusual musculoskeletal symptoms. Prior to Visit Medications    Medication Sig Taking?  Authorizing Provider   metoprolol succinate (TOPROL XL) 50 MG extended release tablet Take 1 tablet by mouth nightly  Patient taking differently: Take 37.5 mg by mouth nightly Yes DO Nell   atorvastatin (LIPITOR) 80 MG tablet Take 1 tablet by mouth daily Yes Historical Provider, MD   Semaglutide (OZEMPIC, 1 MG/DOSE, SC) Inject 1 mg into the skin once a week Yes Historical Provider, MD   hydroCHLOROthiazide (MICROZIDE) 12.5 MG capsule Take 1 capsule by mouth every morning 1/2 tab in the AM Yes Historical Provider, MD   empagliflozin (JARDIANCE) 25 MG tablet Take 1 tablet by mouth daily Yes Historical Provider, MD   aspirin EC 81 MG EC tablet Take 1 tablet by mouth daily Yes DO Nell   tamsulosin (FLOMAX) 0.4 MG capsule TAKE ONE CAPSULE BY MOUTH DAILY  Patient taking differently: 1 capsule nightly as needed TAKE ONE CAPSULE BY MOUTH DAILY Yes Regla Patel MD   Lancets MISC One Touch Verio IQ Pt test TID Yes Regla Patel MD   ONETOUCH VERIO strip USE ONE STRIP TO TEST DAILY AS NEEDED Yes Regla Patel MD   sildenafil (VIAGRA) 100 MG tablet TAKE ONE TABLET BY MOUTH  AS NEEDED FOR ERECTILE DYSFUNCTION Yes Regla Patel MD   metFORMIN (GLUCOPHAGE) 1000 MG tablet TAKE ONE TABLET BY MOUTH TWICE A DAY WITH MEALS Yes Regla Patel MD   glimepiride (AMARYL) 2 MG tablet Take 1

## 2023-09-11 ENCOUNTER — TELEPHONE (OUTPATIENT)
Dept: PULMONOLOGY | Age: 78
End: 2023-09-11

## 2023-09-11 NOTE — TELEPHONE ENCOUNTER
Would recommend he contact [employer] about his replacement device and notify them it is malfunctioning. ([phone] option 3). Would also encourage him to contact his DME to see if they can evaluate it.

## 2023-09-11 NOTE — TELEPHONE ENCOUNTER
Called and spoke with pt regarding message below. Pt is getting the \"needs maintenance contact healthcare provider\" which is causing patient to have to reboot machine, patient states he has a recalled device and all equipment has been replaced.  Patient states that every time that message comes up the machine eventually cuts off

## 2023-09-11 NOTE — TELEPHONE ENCOUNTER
Pt called in and lm stating that he is having an issue with his machine. Pt is getting the \"needs maintenance contact healthcare provider\" message on his machine. Pt last seen 05/2023. What should pt do next?      [phone]

## 2023-10-04 ENCOUNTER — OFFICE VISIT (OUTPATIENT)
Dept: FAMILY MEDICINE CLINIC | Age: 78
End: 2023-10-04

## 2023-10-04 VITALS
DIASTOLIC BLOOD PRESSURE: 72 MMHG | OXYGEN SATURATION: 95 % | BODY MASS INDEX: 31.46 KG/M2 | HEART RATE: 79 BPM | WEIGHT: 232 LBS | SYSTOLIC BLOOD PRESSURE: 106 MMHG

## 2023-10-04 DIAGNOSIS — E78.2 MIXED HYPERLIPIDEMIA: Chronic | ICD-10-CM

## 2023-10-04 DIAGNOSIS — I10 ESSENTIAL HYPERTENSION: Chronic | ICD-10-CM

## 2023-10-04 DIAGNOSIS — E11.9 TYPE 2 DIABETES MELLITUS WITHOUT COMPLICATION, WITHOUT LONG-TERM CURRENT USE OF INSULIN (HCC): Primary | ICD-10-CM

## 2023-10-04 DIAGNOSIS — G47.33 OBSTRUCTIVE SLEEP APNEA SYNDROME: Chronic | ICD-10-CM

## 2023-10-04 DIAGNOSIS — Z23 FLU VACCINE NEED: ICD-10-CM

## 2023-10-04 NOTE — PROGRESS NOTES
Rob Hayes is a 78 y.o. male. HPI:here for complex medical visit  Had left buttock injection 2 days ago, has DIANDRA on Monday for his low back pain  Continues seek counseling for PTSD  Labs from the [place] brought in today for review  Denies numbness tingling burning stinging or open sores on his feet  Meds, vitamins and allergies reviewed with pt    ROS: No TIA's or unusual headaches, no dysphagia. No prolonged cough. No dyspnea or chest pain on exertion. No abdominal pain, change in bowel habits, black or bloody stools. No urinary tract symptoms. No new or unusual musculoskeletal symptoms. Prior to Visit Medications    Medication Sig Taking?  Authorizing Provider   metoprolol succinate (TOPROL XL) 50 MG extended release tablet Take 1 tablet by mouth nightly  Patient taking differently: Take 37.5 mg by mouth nightly Yes Alivia Syed N, DO   atorvastatin (LIPITOR) 80 MG tablet Take 1 tablet by mouth daily Yes Cristian Tan MD   Semaglutide (OZEMPIC, 1 MG/DOSE, SC) Inject 1 mg into the skin once a week Yes Cristian Tan MD   hydroCHLOROthiazide (MICROZIDE) 12.5 MG capsule Take 1 capsule by mouth every morning 1/2 tab in the AM Yes ProviderCristian MD   empagliflozin (JARDIANCE) 25 MG tablet Take 1 tablet by mouth daily Yes Cristian Tan MD   aspirin EC 81 MG EC tablet Take 1 tablet by mouth daily Yes Alivia Syed N, DO   tamsulosin (FLOMAX) 0.4 MG capsule TAKE ONE CAPSULE BY MOUTH DAILY  Patient taking differently: 1 capsule nightly as needed TAKE ONE CAPSULE BY MOUTH DAILY Yes Ilya Gee MD   Lancets MISC One Touch Verio IQ Pt test TID Yes Ilya Gee MD   ONETOUCH VERIO strip USE ONE STRIP TO TEST DAILY AS NEEDED Yes Ilya Gee MD   sildenafil (VIAGRA) 100 MG tablet TAKE ONE TABLET BY MOUTH  AS NEEDED FOR ERECTILE DYSFUNCTION Yes Ilya Gee MD   metFORMIN (GLUCOPHAGE) 1000 MG tablet TAKE ONE TABLET BY MOUTH TWICE A DAY WITH MEALS Yes

## 2023-12-12 NOTE — PATIENT INSTRUCTIONS
Continue current medications. Call if further weight loss, dizziness. We will likely change Hydrochlorothiazide from daily to as needed. Annual CV follow up.

## 2023-12-13 ENCOUNTER — TELEPHONE (OUTPATIENT)
Dept: CARDIOLOGY CLINIC | Age: 78
End: 2023-12-13

## 2023-12-13 ENCOUNTER — HOSPITAL ENCOUNTER (OUTPATIENT)
Dept: NON INVASIVE DIAGNOSTICS | Age: 78
Discharge: HOME | End: 2023-12-13
Payer: MEDICARE

## 2023-12-13 ENCOUNTER — OFFICE VISIT (OUTPATIENT)
Dept: CARDIOLOGY CLINIC | Age: 78
End: 2023-12-13
Payer: MEDICARE

## 2023-12-13 VITALS
DIASTOLIC BLOOD PRESSURE: 80 MMHG | HEART RATE: 96 BPM | RESPIRATION RATE: 16 BRPM | HEIGHT: 72 IN | OXYGEN SATURATION: 98 % | BODY MASS INDEX: 30.88 KG/M2 | SYSTOLIC BLOOD PRESSURE: 126 MMHG | WEIGHT: 228 LBS | TEMPERATURE: 97.4 F

## 2023-12-13 DIAGNOSIS — I25.10 CORONARY ARTERY DISEASE INVOLVING NATIVE CORONARY ARTERY OF NATIVE HEART WITHOUT ANGINA PECTORIS: Primary | Chronic | ICD-10-CM

## 2023-12-13 DIAGNOSIS — E78.2 MIXED HYPERLIPIDEMIA: Chronic | ICD-10-CM

## 2023-12-13 DIAGNOSIS — I10 ESSENTIAL HYPERTENSION: Chronic | ICD-10-CM

## 2023-12-13 DIAGNOSIS — G47.33 OBSTRUCTIVE SLEEP APNEA SYNDROME: Chronic | ICD-10-CM

## 2023-12-13 PROCEDURE — 93017 CV STRESS TEST TRACING ONLY: CPT | Performed by: INTERNAL MEDICINE

## 2023-12-13 PROCEDURE — 3078F DIAST BP <80 MM HG: CPT | Performed by: INTERNAL MEDICINE

## 2023-12-13 PROCEDURE — 78452 HT MUSCLE IMAGE SPECT MULT: CPT | Performed by: INTERNAL MEDICINE

## 2023-12-13 PROCEDURE — 3430000000 HC RX DIAGNOSTIC RADIOPHARMACEUTICAL: Performed by: INTERNAL MEDICINE

## 2023-12-13 PROCEDURE — 1123F ACP DISCUSS/DSCN MKR DOCD: CPT | Performed by: INTERNAL MEDICINE

## 2023-12-13 PROCEDURE — A9502 TC99M TETROFOSMIN: HCPCS | Performed by: INTERNAL MEDICINE

## 2023-12-13 PROCEDURE — 99214 OFFICE O/P EST MOD 30 MIN: CPT | Performed by: INTERNAL MEDICINE

## 2023-12-13 PROCEDURE — 3074F SYST BP LT 130 MM HG: CPT | Performed by: INTERNAL MEDICINE

## 2023-12-13 RX ADMIN — TETROFOSMIN 30 MILLICURIE: 1.38 INJECTION, POWDER, LYOPHILIZED, FOR SOLUTION INTRAVENOUS at 09:19

## 2023-12-13 RX ADMIN — TETROFOSMIN 10 MILLICURIE: 1.38 INJECTION, POWDER, LYOPHILIZED, FOR SOLUTION INTRAVENOUS at 08:20

## 2023-12-13 NOTE — PROGRESS NOTES
Patient instructed on Manuel Protocol Stress Test Procedure including possible side effects and adverse reactions. Verbalizes knowledge and understanding and denies having any questions.

## 2024-02-21 LAB — DIABETIC RETINOPATHY: NEGATIVE

## 2024-03-06 ENCOUNTER — OFFICE VISIT (OUTPATIENT)
Dept: FAMILY MEDICINE CLINIC | Age: 79
End: 2024-03-06

## 2024-03-06 VITALS
DIASTOLIC BLOOD PRESSURE: 66 MMHG | SYSTOLIC BLOOD PRESSURE: 93 MMHG | OXYGEN SATURATION: 98 % | WEIGHT: 226 LBS | HEART RATE: 96 BPM | BODY MASS INDEX: 30.65 KG/M2

## 2024-03-06 DIAGNOSIS — E66.01 SEVERE OBESITY (BMI 35.0-39.9) WITH COMORBIDITY (HCC): ICD-10-CM

## 2024-03-06 DIAGNOSIS — I10 ESSENTIAL HYPERTENSION: Chronic | ICD-10-CM

## 2024-03-06 DIAGNOSIS — F43.10 PTSD (POST-TRAUMATIC STRESS DISORDER): ICD-10-CM

## 2024-03-06 DIAGNOSIS — Z00.00 MEDICARE ANNUAL WELLNESS VISIT, SUBSEQUENT: Primary | ICD-10-CM

## 2024-03-06 DIAGNOSIS — E11.9 TYPE 2 DIABETES MELLITUS WITHOUT COMPLICATION, WITHOUT LONG-TERM CURRENT USE OF INSULIN (HCC): ICD-10-CM

## 2024-03-06 DIAGNOSIS — I25.10 CORONARY ARTERY DISEASE INVOLVING NATIVE CORONARY ARTERY OF NATIVE HEART WITHOUT ANGINA PECTORIS: Chronic | ICD-10-CM

## 2024-03-06 DIAGNOSIS — E66.9 OBESITY (BMI 30.0-34.9): ICD-10-CM

## 2024-03-06 DIAGNOSIS — E78.2 MIXED HYPERLIPIDEMIA: Chronic | ICD-10-CM

## 2024-03-06 DIAGNOSIS — G47.33 OBSTRUCTIVE SLEEP APNEA SYNDROME: Chronic | ICD-10-CM

## 2024-03-06 SDOH — ECONOMIC STABILITY: FOOD INSECURITY: WITHIN THE PAST 12 MONTHS, YOU WORRIED THAT YOUR FOOD WOULD RUN OUT BEFORE YOU GOT MONEY TO BUY MORE.: NEVER TRUE

## 2024-03-06 SDOH — ECONOMIC STABILITY: INCOME INSECURITY: HOW HARD IS IT FOR YOU TO PAY FOR THE VERY BASICS LIKE FOOD, HOUSING, MEDICAL CARE, AND HEATING?: NOT HARD AT ALL

## 2024-03-06 SDOH — ECONOMIC STABILITY: FOOD INSECURITY: WITHIN THE PAST 12 MONTHS, THE FOOD YOU BOUGHT JUST DIDN'T LAST AND YOU DIDN'T HAVE MONEY TO GET MORE.: NEVER TRUE

## 2024-03-06 ASSESSMENT — PATIENT HEALTH QUESTIONNAIRE - PHQ9
SUM OF ALL RESPONSES TO PHQ QUESTIONS 1-9: 0
1. LITTLE INTEREST OR PLEASURE IN DOING THINGS: 0
SUM OF ALL RESPONSES TO PHQ9 QUESTIONS 1 & 2: 0
SUM OF ALL RESPONSES TO PHQ QUESTIONS 1-9: 0
2. FEELING DOWN, DEPRESSED OR HOPELESS: 0

## 2024-03-06 ASSESSMENT — LIFESTYLE VARIABLES
HOW MANY STANDARD DRINKS CONTAINING ALCOHOL DO YOU HAVE ON A TYPICAL DAY: PATIENT DOES NOT DRINK
HOW OFTEN DO YOU HAVE A DRINK CONTAINING ALCOHOL: NEVER

## 2024-03-06 NOTE — PROGRESS NOTES
intact, conjunctivae normal  ENT: tympanic membrane, external ear and ear canal normal bilaterally, oropharynx clear and moist with normal mucous membranes  Neck: neck supple and non tender without mass, no thyromegaly or thyroid nodules, no cervical lymphadenopathy   Pulmonary/Chest: clear to auscultation bilaterally- no wheezes, rales or rhonchi, normal air movement, no respiratory distress  Cardiovascular: normal rate, normal S1 and S2, no gallops, intact distal pulses, and no carotid bruits  Abdomen: soft, non-tender, non-distended, normal bowel sounds, no masses or organomegaly, 2 cm button in ruq noted form prior bariatric surgery  Extremities: no cyanosis and no clubbing       Allergies   Allergen Reactions    Ace Inhibitors      hyperkalemia     Prior to Visit Medications    Medication Sig Taking? Authorizing Provider   metoprolol succinate (TOPROL XL) 50 MG extended release tablet Take 1 tablet by mouth nightly  Patient taking differently: Take 37.5 mg by mouth nightly Yes Melania Álvarez DO   atorvastatin (LIPITOR) 80 MG tablet Take 1 tablet by mouth daily Yes Cristian Tan MD   Semaglutide (OZEMPIC, 1 MG/DOSE, SC) Inject 2 mg into the skin once a week Yes Cristian Tan MD   hydroCHLOROthiazide (MICROZIDE) 12.5 MG capsule Take 1 capsule by mouth every morning 1/2 tab in the AM Yes Cristian Tan MD   empagliflozin (JARDIANCE) 25 MG tablet Take 1 tablet by mouth daily Yes Cristian Tan MD   aspirin EC 81 MG EC tablet Take 1 tablet by mouth daily Yes Melania Álvarez DO   tamsulosin (FLOMAX) 0.4 MG capsule TAKE ONE CAPSULE BY MOUTH DAILY  Patient taking differently: 1 capsule nightly as needed TAKE ONE CAPSULE BY MOUTH DAILY Yes Damian Moran MD   Lancets MISC One Touch Verio IQ Pt test TID Yes aDmian Moran MD   ONETOUCH VERIO strip USE ONE STRIP TO TEST DAILY AS NEEDED Yes Damian Moran MD   sildenafil (VIAGRA) 100 MG tablet TAKE ONE TABLET BY MOUTH

## 2024-03-06 NOTE — PATIENT INSTRUCTIONS
low-dose aspirin. Wait for an ambulance. Do not try to drive yourself.  Watch closely for changes in your health, and be sure to contact your doctor if you have any problems.  Where can you learn more?  Go to https://www.[employer].net/patientEd and enter F075 to learn more about \"A Healthy Heart: Care Instructions.\"  Current as of: June 25, 2023               Content Version: 13.9  © 9444-5817 [employer].   Care instructions adapted under license by [employer]. If you have questions about a medical condition or this instruction, always ask your healthcare professional. [employer] disclaims any warranty or liability for your use of this information.      Personalized Preventive Plan for Zurdo Murillo - 3/6/2024  Medicare offers a range of preventive health benefits. Some of the tests and screenings are paid in full while other may be subject to a deductible, co-insurance, and/or copay.    Some of these benefits include a comprehensive review of your medical history including lifestyle, illnesses that may run in your family, and various assessments and screenings as appropriate.    After reviewing your medical record and screening and assessments performed today your provider may have ordered immunizations, labs, imaging, and/or referrals for you.  A list of these orders (if applicable) as well as your Preventive Care list are included within your After Visit Summary for your review.    Other Preventive Recommendations:    A preventive eye exam performed by an eye specialist is recommended every 1-2 years to screen for glaucoma; cataracts, macular degeneration, and other eye disorders.  A preventive dental visit is recommended every 6 months.  Try to get at least 150 minutes of exercise per week or 10,000 steps per day on a pedometer .  Order or download the FREE \"Exercise & Physical Activity: Your Everyday Guide\" from [employer]. Call 1-381.472.4654 or search The

## 2024-05-27 ASSESSMENT — SLEEP AND FATIGUE QUESTIONNAIRES
HOW LIKELY ARE YOU TO NOD OFF OR FALL ASLEEP WHILE SITTING INACTIVE IN A PUBLIC PLACE: WOULD NEVER DOZE
HOW LIKELY ARE YOU TO NOD OFF OR FALL ASLEEP WHEN YOU ARE A PASSENGER IN A CAR FOR AN HOUR WITHOUT A BREAK: WOULD NEVER DOZE
HOW LIKELY ARE YOU TO NOD OFF OR FALL ASLEEP WHILE WATCHING TV: SLIGHT CHANCE OF DOZING
HOW LIKELY ARE YOU TO NOD OFF OR FALL ASLEEP WHILE SITTING QUIETLY AFTER LUNCH WITHOUT ALCOHOL: WOULD NEVER DOZE
HOW LIKELY ARE YOU TO NOD OFF OR FALL ASLEEP IN A CAR, WHILE STOPPED FOR A FEW MINUTES IN TRAFFIC: WOULD NEVER DOZE
HOW LIKELY ARE YOU TO NOD OFF OR FALL ASLEEP WHILE SITTING AND READING: SLIGHT CHANCE OF DOZING
HOW LIKELY ARE YOU TO NOD OFF OR FALL ASLEEP WHILE SITTING AND TALKING TO SOMEONE: WOULD NEVER DOZE
HOW LIKELY ARE YOU TO NOD OFF OR FALL ASLEEP WHILE LYING DOWN TO REST IN THE AFTERNOON WHEN CIRCUMSTANCES PERMIT: SLIGHT CHANCE OF DOZING
HOW LIKELY ARE YOU TO NOD OFF OR FALL ASLEEP WHILE LYING DOWN TO REST IN THE AFTERNOON WHEN CIRCUMSTANCES PERMIT: SLIGHT CHANCE OF DOZING
ESS TOTAL SCORE: 3
HOW LIKELY ARE YOU TO NOD OFF OR FALL ASLEEP WHILE SITTING QUIETLY AFTER LUNCH WITHOUT ALCOHOL: WOULD NEVER DOZE
HOW LIKELY ARE YOU TO NOD OFF OR FALL ASLEEP WHILE SITTING INACTIVE IN A PUBLIC PLACE: WOULD NEVER DOZE
HOW LIKELY ARE YOU TO NOD OFF OR FALL ASLEEP IN A CAR, WHILE STOPPED FOR A FEW MINUTES IN TRAFFIC: WOULD NEVER DOZE
HOW LIKELY ARE YOU TO NOD OFF OR FALL ASLEEP WHEN YOU ARE A PASSENGER IN A CAR FOR AN HOUR WITHOUT A BREAK: WOULD NEVER DOZE
HOW LIKELY ARE YOU TO NOD OFF OR FALL ASLEEP WHILE SITTING AND TALKING TO SOMEONE: WOULD NEVER DOZE
HOW LIKELY ARE YOU TO NOD OFF OR FALL ASLEEP WHILE WATCHING TV: SLIGHT CHANCE OF DOZING
HOW LIKELY ARE YOU TO NOD OFF OR FALL ASLEEP WHILE SITTING AND READING: SLIGHT CHANCE OF DOZING

## 2024-05-30 ENCOUNTER — OFFICE VISIT (OUTPATIENT)
Dept: PULMONOLOGY | Age: 79
End: 2024-05-30

## 2024-05-30 VITALS
BODY MASS INDEX: 30.05 KG/M2 | SYSTOLIC BLOOD PRESSURE: 125 MMHG | WEIGHT: 221.6 LBS | DIASTOLIC BLOOD PRESSURE: 78 MMHG | HEART RATE: 71 BPM | OXYGEN SATURATION: 97 %

## 2024-05-30 DIAGNOSIS — E11.9 TYPE 2 DIABETES MELLITUS WITHOUT COMPLICATION, WITHOUT LONG-TERM CURRENT USE OF INSULIN (HCC): Chronic | ICD-10-CM

## 2024-05-30 DIAGNOSIS — G47.33 OBSTRUCTIVE SLEEP APNEA SYNDROME: Primary | Chronic | ICD-10-CM

## 2024-05-30 DIAGNOSIS — I10 ESSENTIAL HYPERTENSION: Chronic | ICD-10-CM

## 2024-05-30 DIAGNOSIS — E66.9 OBESITY (BMI 30.0-34.9): ICD-10-CM

## 2024-05-30 NOTE — PROGRESS NOTES
[phone]    Zurdo Murillo  1945  [address] [id number] [phone] (home)   [phone] (mobile)      Insurance Info (confirm with patient if correct):  Payer/Plan Subscr  Sex Relation Sub. Ins. ID Effective Group Num

## 2024-05-30 NOTE — ASSESSMENT & PLAN NOTE
Chronic-not stable:  Discussed importance of treating obstructive sleep apnea and getting sufficient sleep to assist with weight control.  Discussed weight gain and/or weight loss may require adjustments to machine settings. Encouraged him to work on weight loss through diet and exercise.

## 2024-05-30 NOTE — ASSESSMENT & PLAN NOTE
Chronic-Stable: Reviewed and analyzed results of physiologic download from patient's machine and reviewed with patient.  Supplies and parts as needed for his machine.  These are medically necessary.  Limit caffeine use after 3pm. Based on the analyzed data will continue with current settings. Stable on his machine at current settings, getting benefit from the use, and having minimal side effects. Discussed could trial pressure increase but he would like to hold off at this time as his symptoms are improving. Will see him back in 1 year. Encouraged him to contact the office with any questions or concerns.

## 2024-05-30 NOTE — PROGRESS NOTES
Shailesh Aguayo CNP  Romana Ace CNP [place]  [address]  [address]  [place] [address]  P- [phone]  F- [phone]     [facility] [place] [facility] SLEEP [facility]  [address]  [address]  [facility] [address]  Dept: [phone]  Dept Fax: [phone]  Loc: [phone]      Assessment/Plan:      1. Obstructive sleep apnea syndrome  Assessment & Plan:  Chronic-Stable: Reviewed and analyzed results of physiologic download from patient's machine and reviewed with patient.  Supplies and parts as needed for his machine.  These are medically necessary.  Limit caffeine use after 3pm. Based on the analyzed data will continue with current settings. Stable on his machine at current settings, getting benefit from the use, and having minimal side effects. Discussed could trial pressure increase but he would like to hold off at this time as his symptoms are improving. Will see him back in 1 year. Encouraged him to contact the office with any questions or concerns.    2. Essential hypertension  Assessment & Plan:  Chronic- Stable.  Discussed the importance of treating obstructive sleep apnea as part of the management of this disorder.  Cont any meds per PCP and other physicians.    3. Type 2 diabetes mellitus without complication, without long-term current use of insulin (HCC)  Assessment & Plan:  Chronic- Stable.  Discussed the importance of treating obstructive sleep apnea as part of the management of this disorder.  Cont any meds per PCP and other physicians.    4. Obesity (BMI 30.0-34.9)  Assessment & Plan:   Chronic-not stable:  Discussed importance of treating obstructive sleep apnea and getting sufficient sleep to assist with weight control.  Discussed weight gain and/or weight loss may require adjustments to machine settings. Encouraged him to work on weight loss through diet and exercise.         Reviewed, analyzed, and

## 2024-09-06 ENCOUNTER — OFFICE VISIT (OUTPATIENT)
Dept: FAMILY MEDICINE CLINIC | Age: 79
End: 2024-09-06

## 2024-09-06 VITALS — BODY MASS INDEX: 30.65 KG/M2 | HEART RATE: 79 BPM | OXYGEN SATURATION: 97 % | WEIGHT: 226 LBS

## 2024-09-06 DIAGNOSIS — C44.90 SKIN CANCER: ICD-10-CM

## 2024-09-06 DIAGNOSIS — E11.9 TYPE 2 DIABETES MELLITUS WITHOUT COMPLICATION, WITHOUT LONG-TERM CURRENT USE OF INSULIN (HCC): Primary | Chronic | ICD-10-CM

## 2024-09-06 DIAGNOSIS — E66.9 OBESITY (BMI 30.0-34.9): ICD-10-CM

## 2024-09-06 DIAGNOSIS — I10 ESSENTIAL HYPERTENSION: Chronic | ICD-10-CM

## 2024-09-06 DIAGNOSIS — E78.2 MIXED HYPERLIPIDEMIA: Chronic | ICD-10-CM

## 2024-09-06 DIAGNOSIS — I25.10 CORONARY ARTERY DISEASE INVOLVING NATIVE CORONARY ARTERY OF NATIVE HEART WITHOUT ANGINA PECTORIS: Chronic | ICD-10-CM

## 2024-09-06 DIAGNOSIS — G47.33 OBSTRUCTIVE SLEEP APNEA SYNDROME: Chronic | ICD-10-CM

## 2024-09-06 NOTE — PROGRESS NOTES
Zurdo Murillo is a 78 y.o. male.    HPI: Here for complex medical visit  [facility] - 7.1  Now on ozempic 2 mg, no ill effects  Arthritis pain  in hands much better on ozempic  Sees  for his chronic low back pain, duran and nerve block - back doing great until fell off a barstool 3 weeks ago, still sore even after motrin 800mg on right paraspinal region  Small upper abd lump for 2-3 months, NT  Meds, vitamins and allergies reviewed with pt    ROS: No TIA's or unusual headaches, no dysphagia.  No prolonged cough. No dyspnea or chest pain on exertion.  No abdominal pain, change in bowel habits, black or bloody stools.  No urinary tract symptoms.  No new or unusual musculoskeletal symptoms.       Prior to Visit Medications    Medication Sig Taking? Authorizing Provider   metoprolol succinate (TOPROL XL) 50 MG extended release tablet Take 1 tablet by mouth nightly  Patient taking differently: Take 37.5 mg by mouth nightly Yes Melania Álvarez DO   atorvastatin (LIPITOR) 80 MG tablet Take 1 tablet by mouth daily Yes Cristian Tan MD   Semaglutide (OZEMPIC, 1 MG/DOSE, SC) Inject 2 mg into the skin once a week Yes Cristian Tan MD   hydroCHLOROthiazide (MICROZIDE) 12.5 MG capsule Take 1 capsule by mouth every morning 1/2 tab in the AM Yes Cristian Tan MD   empagliflozin (JARDIANCE) 25 MG tablet Take 1 tablet by mouth daily Yes Cristian Tan MD   aspirin EC 81 MG EC tablet Take 1 tablet by mouth daily Yes Melania Álvarez DO   tamsulosin (FLOMAX) 0.4 MG capsule TAKE ONE CAPSULE BY MOUTH DAILY  Patient taking differently: 1 capsule nightly as needed TAKE ONE CAPSULE BY MOUTH DAILY Yes Damian Moran MD   Lancets MISC One Touch Verio IQ Pt test TID Yes Damian Moran MD   ONETOUCH VERIO strip USE ONE STRIP TO TEST DAILY AS NEEDED Yes Damian Moran MD   sildenafil (VIAGRA) 100 MG tablet TAKE ONE TABLET BY MOUTH  AS NEEDED FOR ERECTILE

## 2024-10-10 NOTE — PROGRESS NOTES
Presentation: Patient presented to [facility] for total left knee arthroplasty secondary to failed conservative treatment of left knee DJD with varus deformity and limited flexion. Patient was admitted for continued pain control post operatively.  POD #2  Pain is better controlled now, having bowel movements, tolerating food   Thyroid symmetric, normal size. No bruits. Back - Back symmetric, no curvature. ROM normal. No CVA tenderness. Lungs - Percussion normal. Good diaphragmatic excursion. Lungs clear  Heart - Regular rate and rhythm, with no rub, murmur or gallop noted. Abdomen - Abdomen soft, non-tender. BS normal. No masses, organomegaly  Extremities - Extremities normal. No deformities, edema, or skin discolora  Musculoskeletal - Spine ROM normal. Muscular strength intact. Peripheral pulses - radial=4/4, dorsalis pedis=4/4,  Neuro - Gait normal. Reflexes normal and symmetric.  Sensation grossly normal.  No focal weakness    Lab Results   Component Value Date     11/27/2017    K 4.4 11/27/2017     11/27/2017    CO2 26 11/27/2017    BUN 17 11/27/2017    CREATININE 0.8 11/27/2017    GLUCOSE 171 (H) 11/27/2017    CALCIUM 8.6 11/27/2017    PROT 7.1 11/27/2017    LABALBU 4.0 11/27/2017    BILITOT 0.5 11/27/2017    ALKPHOS 38 (L) 11/27/2017    AST 18 11/27/2017    ALT 17 11/27/2017    LABGLOM >60 11/27/2017    GFRAA >60 11/27/2017    AGRATIO 1.3 11/27/2017    GLOB 3.1 11/27/2017     Lab Results   Component Value Date    CHOL 156 06/15/2017    CHOL 160 05/17/2016    CHOL 143 12/02/2015     Lab Results   Component Value Date    TRIG 128 06/15/2017    TRIG 136 05/17/2016    TRIG 102 12/02/2015     Lab Results   Component Value Date    HDL 57 06/15/2017    HDL 54 05/17/2016    HDL 60 12/02/2015     Lab Results   Component Value Date    LDLCALC 73 06/15/2017    LDLCALC 79 05/17/2016    LDLCALC 63 12/02/2015     Lab Results   Component Value Date    LABVLDL 26 06/15/2017    LABVLDL 27 05/17/2016    LABVLDL 20 12/02/2015     No results found for: CHOLHDLRATIO   Lab Results   Component Value Date    WBC 4.4 11/27/2017    HGB 13.3 (L) 11/27/2017    HCT 39.5 (L) 11/27/2017    MCV 91.8 11/27/2017     11/27/2017     Lab Results   Component Value Date    LABA1C 7.3 06/15/2017     Lab Results   Component Value Date    .8 06/15/2017     ASSESSMENT:  OS cat  Encounter Diagnoses   Name Primary?  Essential hypertension stable    Mixed hyperlipidemia stable    Type 2 diabetes mellitus without complication, without long-term current use of insulin (HCC) stable    Coronary artery disease involving native coronary artery of native heart without angina pectoris stable   Per encounter diagnoses  He is medically cleared for surgery and anesthesia. Plan:    Per operating surgeon. See also orders filed with this encounter, if any.

## 2024-12-16 NOTE — PROGRESS NOTES
2024     PATIENT: Zurdo Murillo  : 1945    Primary Care Provider:   Damian Moran MD  o:[phone]  f:[phone]    Reason for evaluation:   Chief Complaint   Patient presents with    1 Year Follow Up     History of present illness:  Mr. Zurdo Murillo is a 79 y.o. male patient here today in annual cardiovascular follow up for history of CAD (3V CABG: LIMA-LAD, SV-OM2, SV-PDA in 2015), hypertension, and hyperlipidemia. Waylon reports his 2015 anginal presentation as an overwhelming fatigue and denies any such concerns or exertional symptoms since our last visit.   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 Following his time in the [employer] program in [place], he has been diagnosed with severe trauma with stressor and depression.   He reports also following more regularly for persistent back pain. He has had epidural injections and followed with Pain management. States he has been \"doing good for six months\" in that regard.   No shortness of breath, orthopnea, LE edema.   Patient is compliant with medications and is tolerating them well without adverse effects. BMI is under 30 since starting Ozempic.    Medical History:      Diagnosis Date    Arthritis     CAD (coronary artery disease)     CAD (coronary artery disease) 2015    Cataract     OD    Deviated septum     Erectile dysfunction     Hyperlipidemia     Hypertension     Lumbar herniated disc     MI (myocardial infarction) (HCC) 2015    Obesity     Post traumatic stress disorder     PTSD (post-traumatic stress disorder) 2023    Retinal detachment     OD    Skin cancer 2023    Torn rotator cuff     RT    Type 2 diabetes mellitus without complication (HCC)     Type 2 diabetes mellitus without complication (HCC)     Type 2 diabetes mellitus without complication (HCC)     Type 2 diabetes mellitus without complication (HCC)     Type 2

## 2024-12-16 NOTE — PATIENT INSTRUCTIONS
Echocardiogram (ultrasound of the heart)  Carotid ultrasound at your convenience     No medication changes

## 2024-12-18 ENCOUNTER — OFFICE VISIT (OUTPATIENT)
Dept: CARDIOLOGY CLINIC | Age: 79
End: 2024-12-18
Payer: MEDICARE

## 2024-12-18 VITALS
HEART RATE: 79 BPM | HEIGHT: 72 IN | SYSTOLIC BLOOD PRESSURE: 110 MMHG | OXYGEN SATURATION: 96 % | WEIGHT: 220.2 LBS | BODY MASS INDEX: 29.82 KG/M2 | DIASTOLIC BLOOD PRESSURE: 60 MMHG

## 2024-12-18 DIAGNOSIS — I25.10 CORONARY ARTERY DISEASE INVOLVING NATIVE CORONARY ARTERY OF NATIVE HEART WITHOUT ANGINA PECTORIS: Primary | Chronic | ICD-10-CM

## 2024-12-18 DIAGNOSIS — I34.0 MITRAL VALVE INSUFFICIENCY, UNSPECIFIED ETIOLOGY: ICD-10-CM

## 2024-12-18 DIAGNOSIS — E78.2 MIXED HYPERLIPIDEMIA: Chronic | ICD-10-CM

## 2024-12-18 DIAGNOSIS — I10 ESSENTIAL HYPERTENSION: Chronic | ICD-10-CM

## 2024-12-18 DIAGNOSIS — I65.23 BILATERAL CAROTID ARTERY STENOSIS: ICD-10-CM

## 2024-12-18 DIAGNOSIS — I25.5 ISCHEMIC CARDIOMYOPATHY: ICD-10-CM

## 2024-12-18 DIAGNOSIS — I42.9 CARDIOMYOPATHY, UNSPECIFIED TYPE (HCC): ICD-10-CM

## 2024-12-18 PROCEDURE — 99214 OFFICE O/P EST MOD 30 MIN: CPT | Performed by: INTERNAL MEDICINE

## 2024-12-18 PROCEDURE — 1159F MED LIST DOCD IN RCRD: CPT | Performed by: INTERNAL MEDICINE

## 2024-12-18 PROCEDURE — 1123F ACP DISCUSS/DSCN MKR DOCD: CPT | Performed by: INTERNAL MEDICINE

## 2024-12-18 PROCEDURE — 3074F SYST BP LT 130 MM HG: CPT | Performed by: INTERNAL MEDICINE

## 2024-12-18 PROCEDURE — 93000 ELECTROCARDIOGRAM COMPLETE: CPT | Performed by: INTERNAL MEDICINE

## 2024-12-18 PROCEDURE — 3078F DIAST BP <80 MM HG: CPT | Performed by: INTERNAL MEDICINE

## 2025-01-27 ENCOUNTER — TELEPHONE (OUTPATIENT)
Dept: CARDIOLOGY CLINIC | Age: 80
End: 2025-01-27

## 2025-01-27 NOTE — TELEPHONE ENCOUNTER
Dr Alvarado is requesting advisement on Zurdo Murillo to undergo right thumb arthroplasty procedure. No request to hold ASA prior to procedure.     Patient last seen in office 12/18/24 for management of CAD (3V CABG: LIMA-LAD, SV-OM2, SV-PDA in 2015), hypertension, and hyperlipidemia.     [facility] 8/12/15 (Held)  Codominant  LM- Insignificant plaque noted on IVUS at orifice, mid concentric 50% circumferential plaque noted through bifurcation of LCx on IVUS  LAD- Prox 50% lesion, mid 95% lesion, mid-distal 70% lesion  D2- Ostial 90% lesion  LCx- Prox tubular 50% lesion, AV ostial 99% lesion  OM2- 90% ostial lesion  LT PDA- 95% prox lesion  RCA- Mid 30% lesion  RT PDA- Prox 99% lesion, then area that gives rise to large septal then distal 100% occluded    Collaterals from rPL to distal rPDA  LV Gram 45% EF  Normal EDP  Inferior apical akinesis     CAD: 2015 CABG x 3- LIMA-LAD, SV-OM2, SV-RPDA (Dr. Bella)  Ischemic cardiomyopathy, recovered to mid range  Hyperlipidemia, controlled (2/2024:  TG 86 LDL 46 HDL 49)  Hypertension, controlled  110/60  Diabetes mellitus (A1c 7.3)  Nonobstructive carotid artery disease  FRANCISCO on CPAP  Severe trauma with stressor and depression  History of hyperkalemia with ACE inhibitor and ARB  Former tobacco use     Please advise.

## 2025-01-27 NOTE — TELEPHONE ENCOUNTER
CARDIAC CLEARANCE     What type of procedure are you having?  Hand surgery- right thumb arthroplasty      Which physician is performing your procedure?       When is your procedure scheduled?  2/12/25    Where are you having this procedure?  [facility]    Are you taking Blood Thinners?   If so what? (Name/dose/frequesncy)  aspirin EC/ 81 MG EC tablet / Take 1 tablet by mouth daily      Does the surgeon want you to stop your blood thinner?  If so for how long?  Does not need to hold     Are you having any new or worsening cardiac symptoms?     Phone Number and Contact Name for Physicians office:  [phone]    Fax number to send information:  [phone]    Cardiac History:  Last office visit with Cardiologist:  12/18/24    Cardiac Procedures:    Cardiac Testing:

## 2025-02-01 PROBLEM — I42.9 CARDIOMYOPATHY, UNSPECIFIED TYPE (HCC): Status: ACTIVE | Noted: 2025-02-01

## 2025-02-03 ENCOUNTER — OFFICE VISIT (OUTPATIENT)
Dept: FAMILY MEDICINE CLINIC | Age: 80
End: 2025-02-03
Payer: MEDICARE

## 2025-02-03 VITALS
RESPIRATION RATE: 17 BRPM | SYSTOLIC BLOOD PRESSURE: 118 MMHG | HEIGHT: 72 IN | WEIGHT: 219.4 LBS | DIASTOLIC BLOOD PRESSURE: 72 MMHG | HEART RATE: 80 BPM | BODY MASS INDEX: 29.72 KG/M2 | OXYGEN SATURATION: 95 %

## 2025-02-03 DIAGNOSIS — I42.9 CARDIOMYOPATHY, UNSPECIFIED TYPE (HCC): Primary | ICD-10-CM

## 2025-02-03 DIAGNOSIS — I10 ESSENTIAL HYPERTENSION: Chronic | ICD-10-CM

## 2025-02-03 DIAGNOSIS — E11.9 TYPE 2 DIABETES MELLITUS WITHOUT COMPLICATION, WITHOUT LONG-TERM CURRENT USE OF INSULIN (HCC): ICD-10-CM

## 2025-02-03 DIAGNOSIS — I42.9 CARDIOMYOPATHY, UNSPECIFIED TYPE (HCC): ICD-10-CM

## 2025-02-03 DIAGNOSIS — E11.65 TYPE 2 DIABETES MELLITUS WITH HYPERGLYCEMIA, WITHOUT LONG-TERM CURRENT USE OF INSULIN (HCC): ICD-10-CM

## 2025-02-03 DIAGNOSIS — E78.2 MIXED HYPERLIPIDEMIA: Chronic | ICD-10-CM

## 2025-02-03 LAB — HBA1C MFR BLD: 8 %

## 2025-02-03 PROCEDURE — 99214 OFFICE O/P EST MOD 30 MIN: CPT | Performed by: FAMILY MEDICINE

## 2025-02-03 PROCEDURE — 3078F DIAST BP <80 MM HG: CPT | Performed by: FAMILY MEDICINE

## 2025-02-03 PROCEDURE — 3052F HG A1C>EQUAL 8.0%<EQUAL 9.0%: CPT | Performed by: FAMILY MEDICINE

## 2025-02-03 PROCEDURE — 1123F ACP DISCUSS/DSCN MKR DOCD: CPT | Performed by: FAMILY MEDICINE

## 2025-02-03 PROCEDURE — 1159F MED LIST DOCD IN RCRD: CPT | Performed by: FAMILY MEDICINE

## 2025-02-03 PROCEDURE — 3074F SYST BP LT 130 MM HG: CPT | Performed by: FAMILY MEDICINE

## 2025-02-03 PROCEDURE — 83036 HEMOGLOBIN GLYCOSYLATED A1C: CPT | Performed by: FAMILY MEDICINE

## 2025-02-03 SDOH — ECONOMIC STABILITY: FOOD INSECURITY: WITHIN THE PAST 12 MONTHS, THE FOOD YOU BOUGHT JUST DIDN'T LAST AND YOU DIDN'T HAVE MONEY TO GET MORE.: NEVER TRUE

## 2025-02-03 SDOH — ECONOMIC STABILITY: FOOD INSECURITY: WITHIN THE PAST 12 MONTHS, YOU WORRIED THAT YOUR FOOD WOULD RUN OUT BEFORE YOU GOT MONEY TO BUY MORE.: NEVER TRUE

## 2025-02-03 ASSESSMENT — PATIENT HEALTH QUESTIONNAIRE - PHQ9
2. FEELING DOWN, DEPRESSED OR HOPELESS: SEVERAL DAYS
SUM OF ALL RESPONSES TO PHQ QUESTIONS 1-9: 1
1. LITTLE INTEREST OR PLEASURE IN DOING THINGS: NOT AT ALL
SUM OF ALL RESPONSES TO PHQ QUESTIONS 1-9: 1
SUM OF ALL RESPONSES TO PHQ QUESTIONS 1-9: 1
SUM OF ALL RESPONSES TO PHQ9 QUESTIONS 1 & 2: 1
SUM OF ALL RESPONSES TO PHQ QUESTIONS 1-9: 1

## 2025-02-03 NOTE — PROGRESS NOTES
Chief Complaint:     Zurdo Murillo is a 79 y.o. male who presents for a preoperative physical examination.  He is scheduled to have right hand surgery done by Dr. Alvarado at [facility] on 2-12-25.    History of Present Illness:      Pain right CMC joint, failed conservative treatment    Past Medical History:   Diagnosis Date    Arthritis     CAD (coronary artery disease)     CAD (coronary artery disease) 09/01/2015    Cataract 2000    OD    Deviated septum     Erectile dysfunction     Hyperlipidemia     Hypertension     Lumbar herniated disc     MI (myocardial infarction) (HCC) 8/9/2015    Obesity     Post traumatic stress disorder     PTSD (post-traumatic stress disorder) 03/06/2023    Retinal detachment 1991    OD    Skin cancer 08/25/2023    Torn rotator cuff     RT    Type 2 diabetes mellitus without complication (HCC)     Type 2 diabetes mellitus without complication (HCC)     Type 2 diabetes mellitus without complication (HCC)     Type 2 diabetes mellitus without complication (HCC)     Type 2 diabetes mellitus without complication (HCC)     Type II or unspecified type diabetes mellitus without mention of complication, not stated as uncontrolled     Unspecified sleep apnea     uses a CPAP        Review of patient's past surgical history indicates:     Past Surgical History:   Procedure Laterality Date    CARDIAC SURGERY      CATARACT REMOVAL WITH IMPLANT  2002    OD    COLONOSCOPY N/A 03/08/2022    COLONOSCOPY POLYPECTOMY SNARE/COLD BIOPSY performed by Olivier Nicolas MD at [facility] ENDOSCOPY    CORONARY ARTERY BYPASS GRAFT  08/11/2015    Dr. Alvarez - Urgent X3 (LIMA-LAD, SVG-OM2 & PDA)    LAP BAND  09/2008    MOHS SURGERY  08/2023    scc on face    RETINAL DETACHMENT SURGERY  1999    OD    ROTATOR CUFF REPAIR      SINUS SURGERY Left 11/27/2017    FESS                                                   Current Outpatient Medications   Medication Sig Dispense Refill    metoprolol succinate (TOPROL XL) 50

## 2025-02-04 ENCOUNTER — HOSPITAL ENCOUNTER (OUTPATIENT)
Dept: VASCULAR LAB | Age: 80
Discharge: HOME | End: 2025-02-06
Attending: INTERNAL MEDICINE
Payer: MEDICARE

## 2025-02-04 ENCOUNTER — HOSPITAL ENCOUNTER (OUTPATIENT)
Age: 80
Discharge: HOME | End: 2025-02-06
Attending: INTERNAL MEDICINE
Payer: MEDICARE

## 2025-02-04 VITALS
WEIGHT: 219 LBS | SYSTOLIC BLOOD PRESSURE: 124 MMHG | HEIGHT: 72 IN | BODY MASS INDEX: 29.66 KG/M2 | DIASTOLIC BLOOD PRESSURE: 67 MMHG

## 2025-02-04 DIAGNOSIS — I65.23 BILATERAL CAROTID ARTERY STENOSIS: ICD-10-CM

## 2025-02-04 DIAGNOSIS — E78.2 MIXED HYPERLIPIDEMIA: Chronic | ICD-10-CM

## 2025-02-04 DIAGNOSIS — I34.0 MITRAL VALVE INSUFFICIENCY, UNSPECIFIED ETIOLOGY: ICD-10-CM

## 2025-02-04 DIAGNOSIS — I42.9 CARDIOMYOPATHY, UNSPECIFIED TYPE (HCC): ICD-10-CM

## 2025-02-04 DIAGNOSIS — I10 ESSENTIAL HYPERTENSION: Chronic | ICD-10-CM

## 2025-02-04 DIAGNOSIS — I25.5 ISCHEMIC CARDIOMYOPATHY: ICD-10-CM

## 2025-02-04 LAB
ALBUMIN SERPL-MCNC: 4.5 G/DL (ref 3.4–5)
ALBUMIN/GLOB SERPL: 1.9 {RATIO} (ref 1.1–2.2)
ALP SERPL-CCNC: 54 U/L (ref 40–129)
ALT SERPL-CCNC: 37 U/L (ref 10–40)
ANION GAP SERPL CALCULATED.3IONS-SCNC: 11 MMOL/L (ref 3–16)
AST SERPL-CCNC: 30 U/L (ref 15–37)
BASOPHILS # BLD: 0 K/UL (ref 0–0.2)
BASOPHILS NFR BLD: 0.4 %
BILIRUB SERPL-MCNC: 0.4 MG/DL (ref 0–1)
BUN SERPL-MCNC: 18 MG/DL (ref 7–20)
CALCIUM SERPL-MCNC: 10 MG/DL (ref 8.3–10.6)
CHLORIDE SERPL-SCNC: 104 MMOL/L (ref 99–110)
CHOLEST SERPL-MCNC: 120 MG/DL (ref 0–199)
CO2 SERPL-SCNC: 27 MMOL/L (ref 21–32)
CREAT SERPL-MCNC: 0.9 MG/DL (ref 0.8–1.3)
DEPRECATED RDW RBC AUTO: 14.1 % (ref 12.4–15.4)
ECHO AO ASC DIAM: 3 CM
ECHO AO ASCENDING AORTA INDEX: 1.36 CM/M2
ECHO AO ROOT DIAM: 3.1 CM
ECHO AO ROOT INDEX: 1.4 CM/M2
ECHO AV AREA PEAK VELOCITY: 2.7 CM2
ECHO AV AREA VTI: 3.2 CM2
ECHO AV AREA/BSA PEAK VELOCITY: 1.2 CM2/M2
ECHO AV AREA/BSA VTI: 1.4 CM2/M2
ECHO AV MEAN GRADIENT: 1 MMHG
ECHO AV MEAN VELOCITY: 0.5 M/S
ECHO AV PEAK GRADIENT: 2 MMHG
ECHO AV PEAK VELOCITY: 0.8 M/S
ECHO AV VELOCITY RATIO: 0.75
ECHO AV VTI: 17.6 CM
ECHO BSA: 2.25 M2
ECHO BSA: 2.25 M2
ECHO EST RA PRESSURE: 8 MMHG
ECHO IVC PROX: 1.1 CM
ECHO LA AREA 2C: 16.9 CM2
ECHO LA AREA 4C: 16 CM2
ECHO LA DIAMETER INDEX: 1.67 CM/M2
ECHO LA DIAMETER: 3.7 CM
ECHO LA MAJOR AXIS: 4.8 CM
ECHO LA MINOR AXIS: 4.9 CM
ECHO LA TO AORTIC ROOT RATIO: 1.19
ECHO LA VOL BP: 45 ML (ref 18–58)
ECHO LA VOL MOD A2C: 45 ML (ref 18–58)
ECHO LA VOL MOD A4C: 45 ML (ref 18–58)
ECHO LA VOL/BSA BIPLANE: 20 ML/M2 (ref 16–34)
ECHO LA VOLUME INDEX MOD A2C: 20 ML/M2 (ref 16–34)
ECHO LA VOLUME INDEX MOD A4C: 20 ML/M2 (ref 16–34)
ECHO LV E' LATERAL VELOCITY: 9.57 CM/S
ECHO LV E' SEPTAL VELOCITY: 8.81 CM/S
ECHO LV EDV A2C: 46 ML
ECHO LV EDV A4C: 59 ML
ECHO LV EDV INDEX A4C: 27 ML/M2
ECHO LV EDV NDEX A2C: 21 ML/M2
ECHO LV EJECTION FRACTION A2C: 59 %
ECHO LV EJECTION FRACTION A4C: 50 %
ECHO LV EJECTION FRACTION BIPLANE: 50 % (ref 55–100)
ECHO LV ESV A2C: 19 ML
ECHO LV ESV A4C: 30 ML
ECHO LV ESV INDEX A2C: 9 ML/M2
ECHO LV ESV INDEX A4C: 14 ML/M2
ECHO LV FRACTIONAL SHORTENING: 36 % (ref 28–44)
ECHO LV INTERNAL DIMENSION DIASTOLE INDEX: 2.4 CM/M2
ECHO LV INTERNAL DIMENSION DIASTOLIC: 5.3 CM (ref 4.2–5.9)
ECHO LV INTERNAL DIMENSION SYSTOLIC INDEX: 1.54 CM/M2
ECHO LV INTERNAL DIMENSION SYSTOLIC: 3.4 CM
ECHO LV IVSD: 0.6 CM (ref 0.6–1)
ECHO LV MASS 2D: 105.2 G (ref 88–224)
ECHO LV MASS INDEX 2D: 47.6 G/M2 (ref 49–115)
ECHO LV POSTERIOR WALL DIASTOLIC: 0.6 CM (ref 0.6–1)
ECHO LV RELATIVE WALL THICKNESS RATIO: 0.23
ECHO LVOT AREA: 3.5 CM2
ECHO LVOT AV VTI INDEX: 0.93
ECHO LVOT DIAM: 2.1 CM
ECHO LVOT MEAN GRADIENT: 1 MMHG
ECHO LVOT PEAK GRADIENT: 1 MMHG
ECHO LVOT PEAK VELOCITY: 0.6 M/S
ECHO LVOT STROKE VOLUME INDEX: 25.7 ML/M2
ECHO LVOT SV: 56.8 ML
ECHO LVOT VTI: 16.4 CM
ECHO MV A VELOCITY: 0.75 M/S
ECHO MV AREA VTI: 2.3 CM2
ECHO MV E DECELERATION TIME (DT): 253 MS
ECHO MV E VELOCITY: 0.6 M/S
ECHO MV E/A RATIO: 0.8
ECHO MV E/E' LATERAL: 6.27
ECHO MV E/E' RATIO (AVERAGED): 6.54
ECHO MV E/E' SEPTAL: 6.81
ECHO MV LVOT VTI INDEX: 1.48
ECHO MV MAX VELOCITY: 0.8 M/S
ECHO MV MEAN GRADIENT: 1 MMHG
ECHO MV MEAN VELOCITY: 0.5 M/S
ECHO MV PEAK GRADIENT: 3 MMHG
ECHO MV VTI: 24.3 CM
ECHO PV MAX VELOCITY: 0.6 M/S
ECHO PV PEAK GRADIENT: 1 MMHG
ECHO RA AREA 4C: 11.9 CM2
ECHO RA END SYSTOLIC VOLUME APICAL 4 CHAMBER INDEX BSA: 14 ML/M2
ECHO RA VOLUME: 30 ML
ECHO RV BASAL DIMENSION: 3.5 CM
ECHO RV FREE WALL PEAK S': 8.4 CM/S
ECHO RV LONGITUDINAL DIMENSION: 6.9 CM
ECHO RV MID DIMENSION: 2.3 CM
ECHO RV TAPSE: 1.5 CM (ref 1.7–?)
EOSINOPHIL # BLD: 0.1 K/UL (ref 0–0.6)
EOSINOPHIL NFR BLD: 1.1 %
GFR SERPLBLD CREATININE-BSD FMLA CKD-EPI: 88 ML/MIN/{1.73_M2}
GLUCOSE P FAST SERPL-MCNC: 149 MG/DL (ref 70–99)
HCT VFR BLD AUTO: 45.1 % (ref 40.5–52.5)
HDLC SERPL-MCNC: 44 MG/DL (ref 40–60)
HGB BLD-MCNC: 15 G/DL (ref 13.5–17.5)
LDL CHOLESTEROL: 50 MG/DL
LYMPHOCYTES # BLD: 1.7 K/UL (ref 1–5.1)
LYMPHOCYTES NFR BLD: 34.4 %
MCH RBC QN AUTO: 31.3 PG (ref 26–34)
MCHC RBC AUTO-ENTMCNC: 33.3 G/DL (ref 31–36)
MCV RBC AUTO: 93.8 FL (ref 80–100)
MONOCYTES # BLD: 0.4 K/UL (ref 0–1.3)
MONOCYTES NFR BLD: 7.3 %
NEUTROPHILS # BLD: 2.8 K/UL (ref 1.7–7.7)
NEUTROPHILS NFR BLD: 56.8 %
PLATELET # BLD AUTO: 209 K/UL (ref 135–450)
PMV BLD AUTO: 9.4 FL (ref 5–10.5)
POTASSIUM SERPL-SCNC: 5.5 MMOL/L (ref 3.5–5.1)
PROT SERPL-MCNC: 6.9 G/DL (ref 6.4–8.2)
RBC # BLD AUTO: 4.81 M/UL (ref 4.2–5.9)
SODIUM SERPL-SCNC: 142 MMOL/L (ref 136–145)
TRIGL SERPL-MCNC: 128 MG/DL (ref 0–150)
VAS LEFT CCA DIST EDV: 14.7 CM/S
VAS LEFT CCA DIST PSV: 86.6 CM/S
VAS LEFT CCA MID EDV: 22.5 CM/S
VAS LEFT CCA MID PSV: 127 CM/S
VAS LEFT CCA PROX EDV: 21.7 CM/S
VAS LEFT CCA PROX PSV: 111 CM/S
VAS LEFT ECA PSV: 113 CM/S
VAS LEFT ICA DIST EDV: 32.9 CM/S
VAS LEFT ICA DIST PSV: 88.7 CM/S
VAS LEFT ICA MID EDV: 28.5 CM/S
VAS LEFT ICA MID PSV: 77.3 CM/S
VAS LEFT ICA PROX EDV: 22.4 CM/S
VAS LEFT ICA PROX PSV: 69.4 CM/S
VAS LEFT ICA/CCA PSV: 0.8
VAS LEFT SUBCLAVIAN PROX PSV: 129 CM/S
VAS LEFT VERTEBRAL EDV: 13.2 CM/S
VAS LEFT VERTEBRAL PSV: 43.9 CM/S
VAS RIGHT CCA DIST EDV: 27.1 CM/S
VAS RIGHT CCA DIST PSV: 115 CM/S
VAS RIGHT CCA MID EDV: 20.3 CM/S
VAS RIGHT CCA MID PSV: 94.9 CM/S
VAS RIGHT CCA PROX EDV: 18.4 CM/S
VAS RIGHT CCA PROX PSV: 91 CM/S
VAS RIGHT ECA PSV: 92 CM/S
VAS RIGHT ICA DIST EDV: 23.8 CM/S
VAS RIGHT ICA DIST PSV: 79.9 CM/S
VAS RIGHT ICA MID EDV: 23.8 CM/S
VAS RIGHT ICA MID PSV: 89.7 CM/S
VAS RIGHT ICA PROX EDV: 25.2 CM/S
VAS RIGHT ICA PROX PSV: 80.6 CM/S
VAS RIGHT ICA/CCA PSV: 0.7
VAS RIGHT SUBCLAVIAN PROX PSV: 129 CM/S
VAS RIGHT VERTEBRAL EDV: 9.11 CM/S
VAS RIGHT VERTEBRAL PSV: 43.1 CM/S
VLDLC SERPL CALC-MCNC: 26 MG/DL
WBC # BLD AUTO: 4.9 K/UL (ref 4–11)

## 2025-02-04 PROCEDURE — 6360000004 HC RX CONTRAST MEDICATION: Performed by: INTERNAL MEDICINE

## 2025-02-04 PROCEDURE — 93880 EXTRACRANIAL BILAT STUDY: CPT | Performed by: INTERNAL MEDICINE

## 2025-02-04 PROCEDURE — 93306 TTE W/DOPPLER COMPLETE: CPT | Performed by: INTERNAL MEDICINE

## 2025-02-04 PROCEDURE — 93880 EXTRACRANIAL BILAT STUDY: CPT

## 2025-02-04 PROCEDURE — C8929 TTE W OR WO FOL WCON,DOPPLER: HCPCS

## 2025-02-04 RX ADMIN — SULFUR HEXAFLUORIDE 2 ML: 60.7; .19; .19 INJECTION, POWDER, LYOPHILIZED, FOR SUSPENSION INTRAVENOUS; INTRAVESICAL at 07:49

## 2025-02-06 ENCOUNTER — TELEPHONE (OUTPATIENT)
Dept: FAMILY MEDICINE CLINIC | Age: 80
End: 2025-02-06

## 2025-02-06 NOTE — TELEPHONE ENCOUNTER
They need something in writing from our office stating that the labs were good and patient has clearance for surgery on February 12th.    Fax # [phone]

## 2025-02-10 ENCOUNTER — TELEPHONE (OUTPATIENT)
Dept: CARDIOLOGY CLINIC | Age: 80
End: 2025-02-10

## 2025-02-10 NOTE — TELEPHONE ENCOUNTER
----- Message from Dr. Melania Álvarez, DO sent at 2/9/2025  5:56 PM EST -----  Stable echo and carotid duplex.   Continue current medications/follow up.  Thank you,

## 2025-02-10 NOTE — TELEPHONE ENCOUNTER
Called [facility] with testing results     I composed a letter today regarding Waylon's  service history contributing to his chronic cardiac conditions- please print and mail to him.  You do not need to call him.    [address] [facility] [address]

## 2025-02-26 LAB — DIABETIC RETINOPATHY: NEGATIVE

## 2025-07-04 SDOH — HEALTH STABILITY: PHYSICAL HEALTH: ON AVERAGE, HOW MANY DAYS PER WEEK DO YOU ENGAGE IN MODERATE TO STRENUOUS EXERCISE (LIKE A BRISK WALK)?: 4 DAYS

## 2025-07-04 SDOH — HEALTH STABILITY: PHYSICAL HEALTH: ON AVERAGE, HOW MANY MINUTES DO YOU ENGAGE IN EXERCISE AT THIS LEVEL?: 40 MIN

## 2025-07-04 ASSESSMENT — PATIENT HEALTH QUESTIONNAIRE - PHQ9
SUM OF ALL RESPONSES TO PHQ QUESTIONS 1-9: 0
SUM OF ALL RESPONSES TO PHQ QUESTIONS 1-9: 0
2. FEELING DOWN, DEPRESSED OR HOPELESS: NOT AT ALL
SUM OF ALL RESPONSES TO PHQ QUESTIONS 1-9: 0
SUM OF ALL RESPONSES TO PHQ QUESTIONS 1-9: 0
1. LITTLE INTEREST OR PLEASURE IN DOING THINGS: NOT AT ALL

## 2025-07-04 ASSESSMENT — LIFESTYLE VARIABLES
HOW OFTEN DO YOU HAVE SIX OR MORE DRINKS ON ONE OCCASION: 1
HOW MANY STANDARD DRINKS CONTAINING ALCOHOL DO YOU HAVE ON A TYPICAL DAY: 1
HOW MANY STANDARD DRINKS CONTAINING ALCOHOL DO YOU HAVE ON A TYPICAL DAY: 1 OR 2
HOW OFTEN DO YOU HAVE A DRINK CONTAINING ALCOHOL: 2
HOW OFTEN DO YOU HAVE A DRINK CONTAINING ALCOHOL: MONTHLY OR LESS

## 2025-07-07 ENCOUNTER — OFFICE VISIT (OUTPATIENT)
Dept: FAMILY MEDICINE CLINIC | Age: 80
End: 2025-07-07

## 2025-07-07 VITALS
SYSTOLIC BLOOD PRESSURE: 114 MMHG | OXYGEN SATURATION: 97 % | HEIGHT: 72 IN | WEIGHT: 216.8 LBS | BODY MASS INDEX: 29.36 KG/M2 | DIASTOLIC BLOOD PRESSURE: 72 MMHG | HEART RATE: 79 BPM

## 2025-07-07 DIAGNOSIS — Z00.00 MEDICARE ANNUAL WELLNESS VISIT, SUBSEQUENT: ICD-10-CM

## 2025-07-07 DIAGNOSIS — E11.9 TYPE 2 DIABETES MELLITUS WITHOUT COMPLICATION, WITHOUT LONG-TERM CURRENT USE OF INSULIN (HCC): Primary | ICD-10-CM

## 2025-07-07 LAB — HBA1C MFR BLD: 7.7 %

## 2025-07-07 RX ORDER — TIRZEPATIDE 10 MG/.5ML
INJECTION, SOLUTION SUBCUTANEOUS WEEKLY
COMMUNITY

## 2025-07-07 NOTE — PROGRESS NOTES
Medicare Annual Wellness Visit    Zurdo Murillo is here for Medicare AWV, Diabetes (Not happy with readings around 180. /Eye exam 2/26 ), Results (Echo done in Feb/ carotid artery check at hospital everything turned out okay), Procedure (CMC surgery in Feb on his thumb 2/12 Dr. Hobson with [facility]/MOHs surgery 3/4 ), and Other (Foot exam 6/[facility] and [facility]/Saw Derm for basal cell on R ear, using cream for the next month)    Assessment & Plan   Type 2 diabetes mellitus without complication, without long-term current use of insulin (HCC)  -     POCT glycosylated hemoglobin (Hb A1C)   See below     No follow-ups on file.     Subjective   Recovering from right CMC surgery 2-12-25  Had  Moh's on nose for bcc  [facility]  Patient's complete Health Risk Assessment and screening values have been reviewed and are found in Flowsheets. The following problems were reviewed today and where indicated follow up appointments were made and/or referrals ordered.    Positive Risk Factor Screenings with Interventions:                      Advanced Directives:  Do you have a Living Will?: (!) (Patient-Rptd) No  Intervention:  Not addressed today                 Objective   Vitals:    07/07/25 0934   BP: 114/72   BP Site: Left Upper Arm   Patient Position: Sitting   BP Cuff Size: Large Adult   Pulse: 79   SpO2: 97%   Weight: 98.3 kg (216 lb 12.8 oz)   Height: 1.829 m (6')      Body mass index is 29.4 kg/m².        General Appearance: alert and oriented to person, place and time, well-developed and well-nourished, in no acute distress. Obese, wt down 3 #  Eyes: pupils equal, round, and reactive to light, extraocular eye movements intact, conjunctivae normal  ENT: tympanic membrane, external ear and ear canal normal bilaterally, oropharynx clear and moist with normal mucous membranes. Wears hearing aids  Neck: neck supple and non tender without mass, no thyromegaly or

## 2025-08-15 ASSESSMENT — SLEEP AND FATIGUE QUESTIONNAIRES
ESS TOTAL SCORE: 4
HOW LIKELY ARE YOU TO NOD OFF OR FALL ASLEEP WHEN YOU ARE A PASSENGER IN A CAR FOR AN HOUR WITHOUT A BREAK: WOULD NEVER DOZE
HOW LIKELY ARE YOU TO NOD OFF OR FALL ASLEEP WHILE SITTING QUIETLY AFTER LUNCH WITHOUT ALCOHOL: SLIGHT CHANCE OF DOZING
HOW LIKELY ARE YOU TO NOD OFF OR FALL ASLEEP WHILE SITTING AND TALKING TO SOMEONE: WOULD NEVER DOZE
HOW LIKELY ARE YOU TO NOD OFF OR FALL ASLEEP WHILE WATCHING TV: SLIGHT CHANCE OF DOZING
HOW LIKELY ARE YOU TO NOD OFF OR FALL ASLEEP IN A CAR, WHILE STOPPED FOR A FEW MINUTES IN TRAFFIC: WOULD NEVER DOZE
HOW LIKELY ARE YOU TO NOD OFF OR FALL ASLEEP WHILE SITTING INACTIVE IN A PUBLIC PLACE: WOULD NEVER DOZE
HOW LIKELY ARE YOU TO NOD OFF OR FALL ASLEEP IN A CAR, WHILE STOPPED FOR A FEW MINUTES IN TRAFFIC: WOULD NEVER DOZE
HOW LIKELY ARE YOU TO NOD OFF OR FALL ASLEEP WHILE WATCHING TV: SLIGHT CHANCE OF DOZING
HOW LIKELY ARE YOU TO NOD OFF OR FALL ASLEEP WHEN YOU ARE A PASSENGER IN A CAR FOR AN HOUR WITHOUT A BREAK: WOULD NEVER DOZE
HOW LIKELY ARE YOU TO NOD OFF OR FALL ASLEEP WHILE LYING DOWN TO REST IN THE AFTERNOON WHEN CIRCUMSTANCES PERMIT: SLIGHT CHANCE OF DOZING
HOW LIKELY ARE YOU TO NOD OFF OR FALL ASLEEP WHILE SITTING AND READING: SLIGHT CHANCE OF DOZING
HOW LIKELY ARE YOU TO NOD OFF OR FALL ASLEEP WHILE SITTING AND TALKING TO SOMEONE: WOULD NEVER DOZE
HOW LIKELY ARE YOU TO NOD OFF OR FALL ASLEEP WHILE SITTING AND READING: SLIGHT CHANCE OF DOZING
HOW LIKELY ARE YOU TO NOD OFF OR FALL ASLEEP WHILE SITTING QUIETLY AFTER LUNCH WITHOUT ALCOHOL: SLIGHT CHANCE OF DOZING
HOW LIKELY ARE YOU TO NOD OFF OR FALL ASLEEP WHILE SITTING INACTIVE IN A PUBLIC PLACE: WOULD NEVER DOZE
HOW LIKELY ARE YOU TO NOD OFF OR FALL ASLEEP WHILE LYING DOWN TO REST IN THE AFTERNOON WHEN CIRCUMSTANCES PERMIT: SLIGHT CHANCE OF DOZING

## 2025-08-21 SDOH — HEALTH STABILITY: PHYSICAL HEALTH: ON AVERAGE, HOW MANY MINUTES DO YOU ENGAGE IN EXERCISE AT THIS LEVEL?: 80 MIN

## 2025-08-21 SDOH — HEALTH STABILITY: PHYSICAL HEALTH: ON AVERAGE, HOW MANY DAYS PER WEEK DO YOU ENGAGE IN MODERATE TO STRENUOUS EXERCISE (LIKE A BRISK WALK)?: 5 DAYS

## 2025-08-22 ENCOUNTER — OFFICE VISIT (OUTPATIENT)
Dept: PULMONOLOGY | Age: 80
End: 2025-08-22
Payer: MEDICARE

## 2025-08-22 VITALS
BODY MASS INDEX: 29.72 KG/M2 | HEART RATE: 65 BPM | SYSTOLIC BLOOD PRESSURE: 114 MMHG | WEIGHT: 219.4 LBS | HEIGHT: 72 IN | OXYGEN SATURATION: 99 % | DIASTOLIC BLOOD PRESSURE: 70 MMHG

## 2025-08-22 DIAGNOSIS — I10 ESSENTIAL HYPERTENSION: Chronic | ICD-10-CM

## 2025-08-22 DIAGNOSIS — I42.9 CARDIOMYOPATHY, UNSPECIFIED TYPE (HCC): ICD-10-CM

## 2025-08-22 DIAGNOSIS — G47.33 OBSTRUCTIVE SLEEP APNEA SYNDROME: Primary | Chronic | ICD-10-CM

## 2025-08-22 DIAGNOSIS — E11.9 TYPE 2 DIABETES MELLITUS WITHOUT COMPLICATION, WITHOUT LONG-TERM CURRENT USE OF INSULIN (HCC): Chronic | ICD-10-CM

## 2025-08-22 PROCEDURE — 1159F MED LIST DOCD IN RCRD: CPT | Performed by: NURSE PRACTITIONER

## 2025-08-22 PROCEDURE — 1123F ACP DISCUSS/DSCN MKR DOCD: CPT | Performed by: NURSE PRACTITIONER

## 2025-08-22 PROCEDURE — 3051F HG A1C>EQUAL 7.0%<8.0%: CPT | Performed by: NURSE PRACTITIONER

## 2025-08-22 PROCEDURE — G2211 COMPLEX E/M VISIT ADD ON: HCPCS | Performed by: NURSE PRACTITIONER

## 2025-08-22 PROCEDURE — 3074F SYST BP LT 130 MM HG: CPT | Performed by: NURSE PRACTITIONER

## 2025-08-22 PROCEDURE — 3078F DIAST BP <80 MM HG: CPT | Performed by: NURSE PRACTITIONER

## 2025-08-22 PROCEDURE — 99214 OFFICE O/P EST MOD 30 MIN: CPT | Performed by: NURSE PRACTITIONER

## 2025-08-22 PROCEDURE — 1160F RVW MEDS BY RX/DR IN RCRD: CPT | Performed by: NURSE PRACTITIONER

## 2025-08-22 RX ORDER — TIRZEPATIDE 12.5 MG/.5ML
12.5 INJECTION, SOLUTION SUBCUTANEOUS WEEKLY
COMMUNITY

## 2025-08-28 ENCOUNTER — OFFICE VISIT (OUTPATIENT)
Dept: FAMILY MEDICINE CLINIC | Age: 80
End: 2025-08-28
Payer: MEDICARE

## 2025-08-28 VITALS
SYSTOLIC BLOOD PRESSURE: 120 MMHG | DIASTOLIC BLOOD PRESSURE: 70 MMHG | BODY MASS INDEX: 29.16 KG/M2 | WEIGHT: 215 LBS | HEART RATE: 80 BPM | OXYGEN SATURATION: 97 %

## 2025-08-28 DIAGNOSIS — I25.10 CORONARY ARTERY DISEASE INVOLVING NATIVE CORONARY ARTERY OF NATIVE HEART WITHOUT ANGINA PECTORIS: Chronic | ICD-10-CM

## 2025-08-28 DIAGNOSIS — I10 ESSENTIAL HYPERTENSION: Chronic | ICD-10-CM

## 2025-08-28 DIAGNOSIS — E11.65 TYPE 2 DIABETES MELLITUS WITH HYPERGLYCEMIA, WITHOUT LONG-TERM CURRENT USE OF INSULIN (HCC): ICD-10-CM

## 2025-08-28 DIAGNOSIS — E78.2 MIXED HYPERLIPIDEMIA: Chronic | ICD-10-CM

## 2025-08-28 DIAGNOSIS — I42.9 CARDIOMYOPATHY, UNSPECIFIED TYPE (HCC): Primary | ICD-10-CM

## 2025-08-28 DIAGNOSIS — G47.33 OBSTRUCTIVE SLEEP APNEA SYNDROME: Chronic | ICD-10-CM

## 2025-08-28 PROCEDURE — 1160F RVW MEDS BY RX/DR IN RCRD: CPT | Performed by: STUDENT IN AN ORGANIZED HEALTH CARE EDUCATION/TRAINING PROGRAM

## 2025-08-28 PROCEDURE — 3078F DIAST BP <80 MM HG: CPT | Performed by: STUDENT IN AN ORGANIZED HEALTH CARE EDUCATION/TRAINING PROGRAM

## 2025-08-28 PROCEDURE — 3074F SYST BP LT 130 MM HG: CPT | Performed by: STUDENT IN AN ORGANIZED HEALTH CARE EDUCATION/TRAINING PROGRAM

## 2025-08-28 PROCEDURE — 3051F HG A1C>EQUAL 7.0%<8.0%: CPT | Performed by: STUDENT IN AN ORGANIZED HEALTH CARE EDUCATION/TRAINING PROGRAM

## 2025-08-28 PROCEDURE — 1123F ACP DISCUSS/DSCN MKR DOCD: CPT | Performed by: STUDENT IN AN ORGANIZED HEALTH CARE EDUCATION/TRAINING PROGRAM

## 2025-08-28 PROCEDURE — 99214 OFFICE O/P EST MOD 30 MIN: CPT | Performed by: STUDENT IN AN ORGANIZED HEALTH CARE EDUCATION/TRAINING PROGRAM

## 2025-08-28 PROCEDURE — 1159F MED LIST DOCD IN RCRD: CPT | Performed by: STUDENT IN AN ORGANIZED HEALTH CARE EDUCATION/TRAINING PROGRAM

## (undated) DEVICE — SNARE ENDOSCP L240CM SHTH DIA24MM LOOP W10MM POLYP RND REINF